# Patient Record
Sex: MALE | Race: WHITE | NOT HISPANIC OR LATINO | Employment: OTHER | URBAN - METROPOLITAN AREA
[De-identification: names, ages, dates, MRNs, and addresses within clinical notes are randomized per-mention and may not be internally consistent; named-entity substitution may affect disease eponyms.]

---

## 2018-04-30 ENCOUNTER — APPOINTMENT (OUTPATIENT)
Dept: RADIOLOGY | Facility: MEDICAL CENTER | Age: 73
DRG: 246 | End: 2018-04-30
Attending: INTERNAL MEDICINE
Payer: MEDICARE

## 2018-04-30 ENCOUNTER — APPOINTMENT (OUTPATIENT)
Dept: RADIOLOGY | Facility: MEDICAL CENTER | Age: 73
DRG: 246 | End: 2018-04-30
Attending: EMERGENCY MEDICINE
Payer: MEDICARE

## 2018-04-30 ENCOUNTER — HOSPITAL ENCOUNTER (OUTPATIENT)
Dept: RADIOLOGY | Facility: MEDICAL CENTER | Age: 73
End: 2018-04-30

## 2018-04-30 ENCOUNTER — HOSPITAL ENCOUNTER (INPATIENT)
Facility: MEDICAL CENTER | Age: 73
LOS: 16 days | DRG: 246 | End: 2018-05-16
Attending: EMERGENCY MEDICINE | Admitting: INTERNAL MEDICINE
Payer: MEDICARE

## 2018-04-30 DIAGNOSIS — I21.4 NSTEMI (NON-ST ELEVATED MYOCARDIAL INFARCTION) (HCC): ICD-10-CM

## 2018-04-30 DIAGNOSIS — I20.0 UNSTABLE ANGINA PECTORIS (HCC): ICD-10-CM

## 2018-04-30 DIAGNOSIS — I50.21 ACUTE SYSTOLIC (CONGESTIVE) HEART FAILURE (HCC): ICD-10-CM

## 2018-04-30 DIAGNOSIS — I50.23 ACUTE ON CHRONIC SYSTOLIC CONGESTIVE HEART FAILURE (HCC): ICD-10-CM

## 2018-04-30 DIAGNOSIS — Z51.5 COMFORT MEASURES ONLY STATUS: ICD-10-CM

## 2018-04-30 DIAGNOSIS — R41.0 CONFUSION: ICD-10-CM

## 2018-04-30 DIAGNOSIS — I35.0 SEVERE AORTIC STENOSIS: ICD-10-CM

## 2018-04-30 DIAGNOSIS — R79.89 TROPONIN LEVEL ELEVATED: ICD-10-CM

## 2018-04-30 DIAGNOSIS — I25.9 CHEST PAIN DUE TO MYOCARDIAL ISCHEMIA, UNSPECIFIED ISCHEMIC CHEST PAIN TYPE: ICD-10-CM

## 2018-04-30 PROBLEM — E03.9 HYPOTHYROIDISM: Status: ACTIVE | Noted: 2018-04-30

## 2018-04-30 PROBLEM — N39.41 URGE INCONTINENCE: Status: ACTIVE | Noted: 2018-04-30

## 2018-04-30 PROBLEM — R41.3 MEMORY IMPAIRMENT: Status: ACTIVE | Noted: 2018-04-30

## 2018-04-30 PROBLEM — G89.4 CHRONIC PAIN SYNDROME: Status: ACTIVE | Noted: 2018-04-30

## 2018-04-30 LAB
APTT PPP: 27.6 SEC (ref 24.7–36)
EKG IMPRESSION: NORMAL
INR PPP: 1.14 (ref 0.87–1.13)
LV EJECT FRACT MOD 2C 99903: 23.87
LV EJECT FRACT MOD 4C 99902: 31.79
LV EJECT FRACT MOD BP 99901: 27.31
PROTHROMBIN TIME: 14.3 SEC (ref 12–14.6)
T4 FREE SERPL-MCNC: 0.91 NG/DL (ref 0.53–1.43)
TROPONIN I SERPL-MCNC: 5.2 NG/ML (ref 0–0.04)
TROPONIN I SERPL-MCNC: 6.52 NG/ML (ref 0–0.04)
TSH SERPL DL<=0.005 MIU/L-ACNC: 4.27 UIU/ML (ref 0.38–5.33)

## 2018-04-30 PROCEDURE — A9270 NON-COVERED ITEM OR SERVICE: HCPCS | Performed by: INTERNAL MEDICINE

## 2018-04-30 PROCEDURE — 700102 HCHG RX REV CODE 250 W/ 637 OVERRIDE(OP): Performed by: INTERNAL MEDICINE

## 2018-04-30 PROCEDURE — 92921 HCHG PRQ CARDIAC ANGIOPLAST ADDL LD: CPT | Mod: LD

## 2018-04-30 PROCEDURE — 71045 X-RAY EXAM CHEST 1 VIEW: CPT

## 2018-04-30 PROCEDURE — 84484 ASSAY OF TROPONIN QUANT: CPT

## 2018-04-30 PROCEDURE — 93460 R&L HRT ART/VENTRICLE ANGIO: CPT

## 2018-04-30 PROCEDURE — 700111 HCHG RX REV CODE 636 W/ 250 OVERRIDE (IP)

## 2018-04-30 PROCEDURE — C1894 INTRO/SHEATH, NON-LASER: HCPCS

## 2018-04-30 PROCEDURE — 99152 MOD SED SAME PHYS/QHP 5/>YRS: CPT

## 2018-04-30 PROCEDURE — 700105 HCHG RX REV CODE 258: Performed by: INTERNAL MEDICINE

## 2018-04-30 PROCEDURE — 85730 THROMBOPLASTIN TIME PARTIAL: CPT

## 2018-04-30 PROCEDURE — C1760 CLOSURE DEV, VASC: HCPCS

## 2018-04-30 PROCEDURE — 93458 L HRT ARTERY/VENTRICLE ANGIO: CPT

## 2018-04-30 PROCEDURE — 93005 ELECTROCARDIOGRAM TRACING: CPT | Performed by: EMERGENCY MEDICINE

## 2018-04-30 PROCEDURE — 99153 MOD SED SAME PHYS/QHP EA: CPT

## 2018-04-30 PROCEDURE — 84439 ASSAY OF FREE THYROXINE: CPT

## 2018-04-30 PROCEDURE — 93306 TTE W/DOPPLER COMPLETE: CPT | Mod: 26 | Performed by: INTERNAL MEDICINE

## 2018-04-30 PROCEDURE — 99285 EMERGENCY DEPT VISIT HI MDM: CPT

## 2018-04-30 PROCEDURE — 305478 HCHG 7.5FR EDWARDS SWAN/THERMO

## 2018-04-30 PROCEDURE — 4A023N7 MEASUREMENT OF CARDIAC SAMPLING AND PRESSURE, LEFT HEART, PERCUTANEOUS APPROACH: ICD-10-PCS | Performed by: INTERNAL MEDICINE

## 2018-04-30 PROCEDURE — 99223 1ST HOSP IP/OBS HIGH 75: CPT | Mod: AI | Performed by: INTERNAL MEDICINE

## 2018-04-30 PROCEDURE — 027034Z DILATION OF CORONARY ARTERY, ONE ARTERY WITH DRUG-ELUTING INTRALUMINAL DEVICE, PERCUTANEOUS APPROACH: ICD-10-PCS | Performed by: INTERNAL MEDICINE

## 2018-04-30 PROCEDURE — 84443 ASSAY THYROID STIM HORMONE: CPT

## 2018-04-30 PROCEDURE — C1874 STENT, COATED/COV W/DEL SYS: HCPCS

## 2018-04-30 PROCEDURE — C1887 CATHETER, GUIDING: HCPCS

## 2018-04-30 PROCEDURE — 770020 HCHG ROOM/CARE - TELE (206)

## 2018-04-30 PROCEDURE — 93306 TTE W/DOPPLER COMPLETE: CPT

## 2018-04-30 PROCEDURE — 96365 THER/PROPH/DIAG IV INF INIT: CPT

## 2018-04-30 PROCEDURE — 700111 HCHG RX REV CODE 636 W/ 250 OVERRIDE (IP): Performed by: INTERNAL MEDICINE

## 2018-04-30 PROCEDURE — C1769 GUIDE WIRE: HCPCS

## 2018-04-30 PROCEDURE — 94760 N-INVAS EAR/PLS OXIMETRY 1: CPT

## 2018-04-30 PROCEDURE — 305387 HCHG SUTURES

## 2018-04-30 PROCEDURE — 70450 CT HEAD/BRAIN W/O DYE: CPT

## 2018-04-30 PROCEDURE — 93005 ELECTROCARDIOGRAM TRACING: CPT | Performed by: INTERNAL MEDICINE

## 2018-04-30 PROCEDURE — 360979 HCHG DIAGNOSTIC CATH

## 2018-04-30 PROCEDURE — 96375 TX/PRO/DX INJ NEW DRUG ADDON: CPT

## 2018-04-30 PROCEDURE — B2151ZZ FLUOROSCOPY OF LEFT HEART USING LOW OSMOLAR CONTRAST: ICD-10-PCS | Performed by: INTERNAL MEDICINE

## 2018-04-30 PROCEDURE — C9600 PERC DRUG-EL COR STENT SING: HCPCS | Mod: LC

## 2018-04-30 PROCEDURE — B2111ZZ FLUOROSCOPY OF MULTIPLE CORONARY ARTERIES USING LOW OSMOLAR CONTRAST: ICD-10-PCS | Performed by: INTERNAL MEDICINE

## 2018-04-30 PROCEDURE — 93010 ELECTROCARDIOGRAM REPORT: CPT | Performed by: INTERNAL MEDICINE

## 2018-04-30 PROCEDURE — C1725 CATH, TRANSLUMIN NON-LASER: HCPCS

## 2018-04-30 PROCEDURE — 304952 HCHG R 2 PADS

## 2018-04-30 PROCEDURE — 85610 PROTHROMBIN TIME: CPT

## 2018-04-30 PROCEDURE — 700101 HCHG RX REV CODE 250

## 2018-04-30 RX ORDER — HEPARIN SODIUM,PORCINE 1000/ML
VIAL (ML) INJECTION
Status: COMPLETED
Start: 2018-04-30 | End: 2018-04-30

## 2018-04-30 RX ORDER — DOCUSATE CALCIUM 240 MG
240 CAPSULE ORAL 2 TIMES DAILY
COMMUNITY

## 2018-04-30 RX ORDER — ACETAMINOPHEN 325 MG/1
650 TABLET ORAL EVERY 6 HOURS PRN
Status: DISCONTINUED | OUTPATIENT
Start: 2018-04-30 | End: 2018-05-16 | Stop reason: HOSPADM

## 2018-04-30 RX ORDER — BISACODYL 10 MG
10 SUPPOSITORY, RECTAL RECTAL
Status: DISCONTINUED | OUTPATIENT
Start: 2018-04-30 | End: 2018-05-02

## 2018-04-30 RX ORDER — ASPIRIN 300 MG/1
300 SUPPOSITORY RECTAL DAILY
Status: DISCONTINUED | OUTPATIENT
Start: 2018-04-30 | End: 2018-05-01 | Stop reason: DRUGHIGH

## 2018-04-30 RX ORDER — LEVETIRACETAM 500 MG/1
1500 TABLET ORAL 2 TIMES DAILY
Status: DISCONTINUED | OUTPATIENT
Start: 2018-04-30 | End: 2018-05-16 | Stop reason: HOSPADM

## 2018-04-30 RX ORDER — NITROGLYCERIN 0.4 MG/1
0.4 TABLET SUBLINGUAL
Status: DISCONTINUED | OUTPATIENT
Start: 2018-04-30 | End: 2018-05-06

## 2018-04-30 RX ORDER — METOPROLOL TARTRATE 50 MG/1
50 TABLET, FILM COATED ORAL 2 TIMES DAILY
Status: ON HOLD | COMMUNITY
End: 2018-05-03

## 2018-04-30 RX ORDER — DOPAMINE HYDROCHLORIDE 160 MG/100ML
INJECTION, SOLUTION INTRAVENOUS
Status: COMPLETED
Start: 2018-04-30 | End: 2018-04-30

## 2018-04-30 RX ORDER — ONDANSETRON 4 MG/1
4 TABLET, ORALLY DISINTEGRATING ORAL EVERY 4 HOURS PRN
Status: DISCONTINUED | OUTPATIENT
Start: 2018-04-30 | End: 2018-05-02

## 2018-04-30 RX ORDER — DOXAZOSIN 2 MG/1
2 TABLET ORAL DAILY
Status: DISCONTINUED | OUTPATIENT
Start: 2018-05-01 | End: 2018-05-05

## 2018-04-30 RX ORDER — MECLIZINE HCL 12.5 MG/1
12.5 TABLET ORAL 3 TIMES DAILY PRN
COMMUNITY

## 2018-04-30 RX ORDER — VERAPAMIL HYDROCHLORIDE 2.5 MG/ML
INJECTION, SOLUTION INTRAVENOUS
Status: COMPLETED
Start: 2018-04-30 | End: 2018-04-30

## 2018-04-30 RX ORDER — ONDANSETRON 2 MG/ML
4 INJECTION INTRAMUSCULAR; INTRAVENOUS EVERY 4 HOURS PRN
Status: DISCONTINUED | OUTPATIENT
Start: 2018-04-30 | End: 2018-05-01

## 2018-04-30 RX ORDER — HEPARIN SODIUM 1000 [USP'U]/ML
3200 INJECTION, SOLUTION INTRAVENOUS; SUBCUTANEOUS PRN
Status: DISCONTINUED | OUTPATIENT
Start: 2018-04-30 | End: 2018-05-01

## 2018-04-30 RX ORDER — LIDOCAINE HYDROCHLORIDE 20 MG/ML
INJECTION, SOLUTION INFILTRATION; PERINEURAL
Status: COMPLETED
Start: 2018-04-30 | End: 2018-04-30

## 2018-04-30 RX ORDER — MIDAZOLAM HYDROCHLORIDE 1 MG/ML
INJECTION INTRAMUSCULAR; INTRAVENOUS
Status: COMPLETED
Start: 2018-04-30 | End: 2018-04-30

## 2018-04-30 RX ORDER — ASPIRIN 81 MG/1
324 TABLET, CHEWABLE ORAL DAILY
Status: DISCONTINUED | OUTPATIENT
Start: 2018-04-30 | End: 2018-05-01 | Stop reason: DRUGHIGH

## 2018-04-30 RX ORDER — ASPIRIN 325 MG
325 TABLET ORAL DAILY
Status: DISCONTINUED | OUTPATIENT
Start: 2018-04-30 | End: 2018-05-01 | Stop reason: DRUGHIGH

## 2018-04-30 RX ORDER — CHOLECALCIFEROL (VITAMIN D3) 125 MCG
500 CAPSULE ORAL DAILY
Status: DISCONTINUED | OUTPATIENT
Start: 2018-05-01 | End: 2018-05-07

## 2018-04-30 RX ORDER — CITALOPRAM 20 MG/1
20 TABLET ORAL DAILY
COMMUNITY

## 2018-04-30 RX ORDER — SELEGILINE HYDROCHLORIDE 5 MG/1
5 TABLET ORAL 2 TIMES DAILY
Status: DISCONTINUED | OUTPATIENT
Start: 2018-04-30 | End: 2018-05-16 | Stop reason: HOSPADM

## 2018-04-30 RX ORDER — MORPHINE SULFATE 4 MG/ML
INJECTION, SOLUTION INTRAMUSCULAR; INTRAVENOUS
Status: COMPLETED
Start: 2018-04-30 | End: 2018-04-30

## 2018-04-30 RX ORDER — ATORVASTATIN CALCIUM 40 MG/1
40 TABLET, FILM COATED ORAL EVERY EVENING
Status: DISCONTINUED | OUTPATIENT
Start: 2018-04-30 | End: 2018-05-16 | Stop reason: HOSPADM

## 2018-04-30 RX ORDER — MECLIZINE HCL 12.5 MG/1
12.5 TABLET ORAL 3 TIMES DAILY PRN
Status: DISCONTINUED | OUTPATIENT
Start: 2018-04-30 | End: 2018-05-16 | Stop reason: HOSPADM

## 2018-04-30 RX ORDER — DOCUSATE CALCIUM 240 MG
240 CAPSULE ORAL 2 TIMES DAILY
Status: DISCONTINUED | OUTPATIENT
Start: 2018-04-30 | End: 2018-05-16 | Stop reason: HOSPADM

## 2018-04-30 RX ORDER — BIVALIRUDIN 250 MG/5ML
INJECTION, POWDER, LYOPHILIZED, FOR SOLUTION INTRAVENOUS
Status: COMPLETED
Start: 2018-04-30 | End: 2018-04-30

## 2018-04-30 RX ORDER — CITALOPRAM 20 MG/1
20 TABLET ORAL DAILY
Status: DISCONTINUED | OUTPATIENT
Start: 2018-05-01 | End: 2018-05-07

## 2018-04-30 RX ORDER — POLYETHYLENE GLYCOL 3350 17 G/17G
1 POWDER, FOR SOLUTION ORAL
Status: DISCONTINUED | OUTPATIENT
Start: 2018-04-30 | End: 2018-05-02

## 2018-04-30 RX ORDER — SODIUM CHLORIDE 9 MG/ML
INJECTION, SOLUTION INTRAVENOUS CONTINUOUS
Status: DISCONTINUED | OUTPATIENT
Start: 2018-04-30 | End: 2018-05-01

## 2018-04-30 RX ORDER — AMOXICILLIN 250 MG
2 CAPSULE ORAL 2 TIMES DAILY
Status: DISCONTINUED | OUTPATIENT
Start: 2018-04-30 | End: 2018-05-02

## 2018-04-30 RX ORDER — GABAPENTIN 300 MG/1
300 CAPSULE ORAL 3 TIMES DAILY
Status: DISCONTINUED | OUTPATIENT
Start: 2018-04-30 | End: 2018-05-16 | Stop reason: HOSPADM

## 2018-04-30 RX ORDER — METOPROLOL TARTRATE 50 MG/1
50 TABLET, FILM COATED ORAL 2 TIMES DAILY
Status: DISCONTINUED | OUTPATIENT
Start: 2018-04-30 | End: 2018-05-03

## 2018-04-30 RX ADMIN — MORPHINE SULFATE 2 MG: 4 INJECTION INTRAVENOUS at 20:21

## 2018-04-30 RX ADMIN — NITROGLYCERIN 10 ML: 20 INJECTION INTRAVENOUS at 23:47

## 2018-04-30 RX ADMIN — NITROGLYCERIN 0.4 MG: 0.4 TABLET SUBLINGUAL at 20:07

## 2018-04-30 RX ADMIN — LIDOCAINE HYDROCHLORIDE: 20 INJECTION, SOLUTION INFILTRATION; PERINEURAL at 23:47

## 2018-04-30 RX ADMIN — GABAPENTIN 300 MG: 300 CAPSULE ORAL at 18:02

## 2018-04-30 RX ADMIN — NITROGLYCERIN 0.4 MG: 0.4 TABLET SUBLINGUAL at 19:54

## 2018-04-30 RX ADMIN — HEPARIN SODIUM 1200 UNITS/HR: 5000 INJECTION, SOLUTION INTRAVENOUS; SUBCUTANEOUS at 18:06

## 2018-04-30 RX ADMIN — BIVALIRUDIN 250 MG: 250 INJECTION, POWDER, LYOPHILIZED, FOR SOLUTION INTRAVENOUS at 23:49

## 2018-04-30 RX ADMIN — HEPARIN SODIUM 2000 UNITS: 200 INJECTION, SOLUTION INTRAVENOUS at 23:47

## 2018-04-30 RX ADMIN — MIDAZOLAM 2 MG: 1 INJECTION INTRAMUSCULAR; INTRAVENOUS at 23:48

## 2018-04-30 RX ADMIN — HEPARIN SODIUM: 1000 INJECTION, SOLUTION INTRAVENOUS; SUBCUTANEOUS at 23:48

## 2018-04-30 RX ADMIN — SODIUM CHLORIDE: 9 INJECTION, SOLUTION INTRAVENOUS at 18:13

## 2018-04-30 RX ADMIN — HEPARIN SODIUM 3200 UNITS: 1000 INJECTION, SOLUTION INTRAVENOUS; SUBCUTANEOUS at 18:06

## 2018-04-30 RX ADMIN — ASPIRIN 325 MG: 325 TABLET ORAL at 18:02

## 2018-04-30 ASSESSMENT — ENCOUNTER SYMPTOMS
WEAKNESS: 1
PALPITATIONS: 0
SPUTUM PRODUCTION: 0
HEADACHES: 0
DEPRESSION: 0
FOCAL WEAKNESS: 0
TREMORS: 1
HEMOPTYSIS: 0
NECK PAIN: 0
MYALGIAS: 0
DIZZINESS: 0
BRUISES/BLEEDS EASILY: 0
COUGH: 0
FEVER: 0
TINGLING: 0
LOSS OF CONSCIOUSNESS: 0
VOMITING: 1
NAUSEA: 1
SHORTNESS OF BREATH: 1
WEIGHT LOSS: 0
HEARTBURN: 0
CHILLS: 0
BLURRED VISION: 0
ABDOMINAL PAIN: 0

## 2018-04-30 ASSESSMENT — PAIN SCALES - GENERAL
PAINLEVEL_OUTOF10: 0
PAINLEVEL_OUTOF10: 0

## 2018-04-30 ASSESSMENT — LIFESTYLE VARIABLES: DO YOU DRINK ALCOHOL: NO

## 2018-04-30 NOTE — ED TRIAGE NOTES
.  Chief Complaint   Patient presents with   • MI (Non ST Segment Elevation MI)     PT TRANSFER FROM [facility] FOR CP AND TROPONIN OF 3.54 WITH ST DEPRESSION, RECEIVED ASPIRIN, MORPHINE AND NTG PTA.

## 2018-04-30 NOTE — ED PROVIDER NOTES
ED Provider Note    Scribed for Prince Velez M.D. by John Rooney. 4/30/2018  4:02 PM    Primary Care Provider: Ze Whitt M.D.  Means of arrival: EMS  History limited by: Confused, difficulty answering questions, received morphine PTA    CHIEF COMPLAINT  Chief Complaint   Patient presents with   • MI (Non ST Segment Elevation MI)     PT TRANSFER FROM [facility] FOR CP AND TROPONIN OF 3.54 WITH ST DEPRESSION, RECEIVED ASPIRIN, MORPHINE AND NTG PTA.       HPI  Prince Pham is a 72 y.o. male who presents to the ED as a transfer from [facility] for evaluation of an NSTEMI. He was feeling fine until yesterday evening when he began to experience chest pain. Patient took nitroglycerin without relief from his pain. Additionally, per review of transfer information, the patient fell this morning striking his head on the ground. He was seen at [facility] and his troponin was 3.51 and EKG revealed ST depression. Patient received aspirin, morphine, and nitroglycerin prior to arrival here. The patient reports associated headache. He denies shortness of breath, sore throat, blurred vision, abdominal pain, or vomiting. [facility]  HPI limited by the patient's status and confused and difficulty answering questions.    REVIEW OF SYSTEMS  EYES:  Denies blurred vision.   ENT:  Denies sore throat  CARDIOVASCULAR:  Positive for chest pain.  RESPIRATORY:  Denies shortness of breath  GI:  Denies abdominal pain or vomiting.  NEUROLOGIC: Positive for head injury and headache.  ROS limited by the patient's status and confused and difficulty answering questions.    PAST MEDICAL HISTORY  Past Medical History:   Diagnosis Date   • colon Cancer     surgery 8/2006, now in remission, testicular   • Hypertension    • Infectious disease     had shingles December 2013   • Parkinson's disease    • Seizure disorder (CMS-HCC)     last one ? 2010, from head injury   • Testicular cancer (CMS-HCC)     Parkinson's    FAMILY HISTORY  Unknown    SOCIAL HISTORY   reports that he has never smoked. He does not have any smokeless tobacco history on file. He reports that he does not drink alcohol or use drugs.    SURGICAL HISTORY  Past Surgical History:   Procedure Laterality Date   • CERVICAL FUSION POSTERIOR  9/4/2014    Performed by Eligio Bella M.D. at SURGERY [facility]S       CURRENT MEDICATIONS  No current facility-administered medications on file prior to encounter.      Current Outpatient Prescriptions on File Prior to Encounter   Medication Sig Dispense Refill   • doxazosin (CARDURA) 2 MG Tab Take 2 mg by mouth every day.     • aspirin EC (ECOTRIN) 81 MG Tablet Delayed Response Take 81 mg by mouth every day.     • cyanocobalamin (VITAMIN B12) 500 MCG tablet Take 1 Tab by mouth every day. 100 Tab 3   • citalopram (CELEXA) 20 MG Tab Take 40 mg by mouth every day.     • selegiline (ELDEPRYL) 5 MG TABS Take 5 mg by mouth 2 Times a Day. 5 mg 0700 and 1200     • levetiracetam (KEPPRA) 500 MG TABS Take 1,500 mg by mouth 2 times a day. Indications: Tonic-Clonic Seizures     • gabapentin (NEURONTIN) 300 MG CAPS Take 300 mg by mouth 3 times a day. 0700 1200 1700           ALLERGIES  Allergies   Allergen Reactions   • Nkda [No Known Drug Allergy]        PHYSICAL EXAM  VITAL SIGNS: /66   Pulse 96   Temp 36.5 °C (97.7 °F)   Resp 16      Constitutional: Patient is awake. Confused, Follows commands. Mild respiratory distress. Well developed, Well nourished, Non-toxic appearance.  HENT: Normocephalic, Atraumatic, Bilateral external ears normal, Oropharynx pink and dry, with no exudates, Nose patent.  Eyes: PERRLA, EOMI, Sclera and conjunctiva clear, No discharge.   Neck:  Supple no nuchal rigidity, no thyromegaly or mass. Non-tender  Lymphatic: No supraclavicular lymph nodes.   Cardiovascular: Heart is regular rate and rhythm no murmur, rub or thrill.   Thorax & Lungs: Chest is symmetrical, with good breath  sounds. No wheezing or crackles. Mild respiratory distress, No chest tenderness.   Abdomen: Soft, No tenderness no hepatosplenomegaly there is no guarding or rebound, No masses, No pulsatile masses. Bowel sounds are present.  Skin: Warm, Dry, no petechia, purpura, or rash.   Back: Non tender with palpation, No CVA tenderness.   Extremities: No edema. Non tender.   Musculoskeletal: Good range of motion to wrists, elbows, shoulders, hips, knees, and ankles. Pulses 2+ and symmetrical radially and femorally. No gross deformities noted.   Neurologic: Alert oriented to person, place, and time. Confused, Follows commands Strength is 5 over 5 and symmetric in bilateral upper and lower extremities.  Sensory is intact to light touch to face, arms, and legs.  DTRs are symmetrical in biceps brachioradialis, patella and Achilles.   Psychiatric: Normal affect    EKG  1547- 13 Lead EKG interpreted by me shows normal sinus rhythm at 84.  . Axis QRS 21, ST depression in V4-6, Isoelectric point on lead III. Similar to EKG taken earlier today.    LABS  Lab Results   Component Value Date    WBC 8.4 12/03/2016    HEMOGLOBIN 12.8 (L) 12/03/2016    HEMATOCRIT 38.4 (L) 12/03/2016    PLATELETCT 274 12/03/2016    SODIUM 139 12/03/2016    POTASSIUM 4.1 12/03/2016    CHLORIDE 108 12/03/2016    CO2 26 12/03/2016    BUN 17 12/03/2016    GLUCOSE 129 (H) 12/03/2016    CHOLSTRLTOT 137 12/03/2016    LDL 77 12/03/2016    ALTSGPT 17 12/03/2016    ASTSGOT 21 12/03/2016    INR 1.14 (H) 04/30/2018       All labs reviewed by me.    RADIOLOGY/PROCEDURES  OUTSIDE IMAGES-DX CHEST   Final Result      CT-HEAD W/O    (Results Pending)     CT-HEAD W/O   Final Result      1.  Diffuse atrophy and white matter changes.   2.  No acute intracranial hemorrhage or territorial infarct.   3.  Multiple small chronic infarcts.      OUTSIDE IMAGES-DX CHEST   Final Result      Echocardiogram Comp W/O Cont    (Results Pending)   MR-BRAIN-W/O    (Results Pending)        The radiologist's interpretations of all radiological studies have been reviewed by me.     COURSE & MEDICAL DECISION MAKING  Pertinent Labs & Imaging studies reviewed. (See chart for details)    Differential diagnoses include but are not limited to NSTEMI vs stroke vs PE vs dissection    4:02 PM - Patient seen and examined at bedside. Requested ED tests from [facility]. Ordered CT head without contrast, PT/INR, APTT, TSH, free thyroxine, troponin, and EKG.     4:31 PM Paged Hospitalist.    4:37 PM I discussed the patient's case and the above findings with CT who agree to take the patient to CT as soon as possible.    4:43 PM I discussed the patient's case and the above findings with Dr. Carter (Hospitalist) who agrees to admit and will transfer care of the patient at this time.    Decision Making  Patient who has bad Parkinson's and several underlying medical problems. He states last night he developed chest pain took some nitroglycerin didn't help and then fell hitting his head. He isn't seeing a [facility] where he was noted to have a troponin of 3.5. He is given aspirin. He isn't transferred here for further care and evaluation. Upon arrival I signed. Ordered a CAT scan of his head that did not show any intercranial bleeding. Repeat EKG here showed mild ST depressions but no obvious ST elevations. I discussed case with cardiology and with the [facility] Hospitalist. He'll be admitted to the hospital for further care and evaluation and is apparent acute myocardial infarction non-STEMI. His confusion certainly could be from several things although possibly from his Parkinson's and also the morphine that he received. Since that he was here for some time he seemed to be much more mentally clear than previous.    DISPOSITION:  Patient will be admitted to Dr. Carter (Hospitalist) in guarded condition.    FINAL IMPRESSION  1. Elevated troponin  2. Presumed non-STEMI myocardial infarction  3. Confusion    PLAN  1.  Admission [facility]ist  2. Continue workup and evaluation of his elevated troponins and confusion       John MOORE (Scribe), am scribing for, and in the presence of, Prince Velez M.D..    Electronically signed by: John Rooney (Scribe), 4/30/2018    Prince MOORE M.D. personally performed the services described in this documentation, as scribed by John Rooney in my presence, and it is both accurate and complete.    The note accurately reflects work and decisions made by me.  Prince Velez  4/30/2018  6:16 PM

## 2018-04-30 NOTE — ED NOTES
Med rec updated and complete.  Allergies reviewed .  Met with pt at bedside and dicussed current medications  And last doses taken.  Pt denies antibiotic use in last 30 days.

## 2018-05-01 LAB
ACT BLD: 351 SEC (ref 74–137)
ANION GAP SERPL CALC-SCNC: 11 MMOL/L (ref 0–11.9)
BASOPHILS # BLD AUTO: 0.2 % (ref 0–1.8)
BASOPHILS # BLD: 0.02 K/UL (ref 0–0.12)
BUN SERPL-MCNC: 22 MG/DL (ref 8–22)
CALCIUM SERPL-MCNC: 8.4 MG/DL (ref 8.5–10.5)
CHLORIDE SERPL-SCNC: 107 MMOL/L (ref 96–112)
CHOLEST SERPL-MCNC: 107 MG/DL (ref 100–199)
CO2 SERPL-SCNC: 17 MMOL/L (ref 20–33)
CREAT SERPL-MCNC: 1.07 MG/DL (ref 0.5–1.4)
EKG IMPRESSION: NORMAL
EOSINOPHIL # BLD AUTO: 0 K/UL (ref 0–0.51)
EOSINOPHIL NFR BLD: 0 % (ref 0–6.9)
ERYTHROCYTE [DISTWIDTH] IN BLOOD BY AUTOMATED COUNT: 49.3 FL (ref 35.9–50)
GLUCOSE SERPL-MCNC: 135 MG/DL (ref 65–99)
HCT VFR BLD AUTO: 35.3 % (ref 42–52)
HDLC SERPL-MCNC: 33 MG/DL
HGB BLD-MCNC: 11.6 G/DL (ref 14–18)
IMM GRANULOCYTES # BLD AUTO: 0.04 K/UL (ref 0–0.11)
IMM GRANULOCYTES NFR BLD AUTO: 0.4 % (ref 0–0.9)
LDLC SERPL CALC-MCNC: 48 MG/DL
LV EJECT FRACT  99904: 15
LV EJECT FRACT MOD 2C 99903: 13.15
LV EJECT FRACT MOD 4C 99902: 16.94
LV EJECT FRACT MOD BP 99901: 13.89
LYMPHOCYTES # BLD AUTO: 0.53 K/UL (ref 1–4.8)
LYMPHOCYTES NFR BLD: 4.8 % (ref 22–41)
MAGNESIUM SERPL-MCNC: 2 MG/DL (ref 1.5–2.5)
MCH RBC QN AUTO: 31 PG (ref 27–33)
MCHC RBC AUTO-ENTMCNC: 32.9 G/DL (ref 33.7–35.3)
MCV RBC AUTO: 94.4 FL (ref 81.4–97.8)
MONOCYTES # BLD AUTO: 0.69 K/UL (ref 0–0.85)
MONOCYTES NFR BLD AUTO: 6.3 % (ref 0–13.4)
NEUTROPHILS # BLD AUTO: 9.7 K/UL (ref 1.82–7.42)
NEUTROPHILS NFR BLD: 88.3 % (ref 44–72)
NRBC # BLD AUTO: 0 K/UL
NRBC BLD-RTO: 0 /100 WBC
PLATELET # BLD AUTO: 224 K/UL (ref 164–446)
PMV BLD AUTO: 11.2 FL (ref 9–12.9)
POTASSIUM SERPL-SCNC: 4.4 MMOL/L (ref 3.6–5.5)
RBC # BLD AUTO: 3.74 M/UL (ref 4.7–6.1)
SODIUM SERPL-SCNC: 135 MMOL/L (ref 135–145)
TRIGL SERPL-MCNC: 129 MG/DL (ref 0–149)
WBC # BLD AUTO: 11 K/UL (ref 4.8–10.8)

## 2018-05-01 PROCEDURE — G8979 MOBILITY GOAL STATUS: HCPCS | Mod: CI

## 2018-05-01 PROCEDURE — 700105 HCHG RX REV CODE 258

## 2018-05-01 PROCEDURE — A9270 NON-COVERED ITEM OR SERVICE: HCPCS | Performed by: INTERNAL MEDICINE

## 2018-05-01 PROCEDURE — 93010 ELECTROCARDIOGRAM REPORT: CPT | Mod: 76 | Performed by: INTERNAL MEDICINE

## 2018-05-01 PROCEDURE — 700102 HCHG RX REV CODE 250 W/ 637 OVERRIDE(OP)

## 2018-05-01 PROCEDURE — 93306 TTE W/DOPPLER COMPLETE: CPT | Mod: 26 | Performed by: INTERNAL MEDICINE

## 2018-05-01 PROCEDURE — 80061 LIPID PANEL: CPT

## 2018-05-01 PROCEDURE — 700105 HCHG RX REV CODE 258: Performed by: INTERNAL MEDICINE

## 2018-05-01 PROCEDURE — 93005 ELECTROCARDIOGRAM TRACING: CPT | Performed by: INTERNAL MEDICINE

## 2018-05-01 PROCEDURE — A9270 NON-COVERED ITEM OR SERVICE: HCPCS

## 2018-05-01 PROCEDURE — 83735 ASSAY OF MAGNESIUM: CPT

## 2018-05-01 PROCEDURE — 85347 COAGULATION TIME ACTIVATED: CPT

## 2018-05-01 PROCEDURE — 80048 BASIC METABOLIC PNL TOTAL CA: CPT

## 2018-05-01 PROCEDURE — A9270 NON-COVERED ITEM OR SERVICE: HCPCS | Performed by: NURSE PRACTITIONER

## 2018-05-01 PROCEDURE — G8978 MOBILITY CURRENT STATUS: HCPCS | Mod: CK

## 2018-05-01 PROCEDURE — 700101 HCHG RX REV CODE 250

## 2018-05-01 PROCEDURE — 85025 COMPLETE CBC W/AUTO DIFF WBC: CPT

## 2018-05-01 PROCEDURE — 700102 HCHG RX REV CODE 250 W/ 637 OVERRIDE(OP): Performed by: INTERNAL MEDICINE

## 2018-05-01 PROCEDURE — 700102 HCHG RX REV CODE 250 W/ 637 OVERRIDE(OP): Performed by: NURSE PRACTITIONER

## 2018-05-01 PROCEDURE — 700112 HCHG RX REV CODE 229: Performed by: INTERNAL MEDICINE

## 2018-05-01 PROCEDURE — 93308 TTE F-UP OR LMTD: CPT

## 2018-05-01 PROCEDURE — 700111 HCHG RX REV CODE 636 W/ 250 OVERRIDE (IP): Performed by: INTERNAL MEDICINE

## 2018-05-01 PROCEDURE — 99233 SBSQ HOSP IP/OBS HIGH 50: CPT | Performed by: INTERNAL MEDICINE

## 2018-05-01 PROCEDURE — 700117 HCHG RX CONTRAST REV CODE 255: Performed by: INTERNAL MEDICINE

## 2018-05-01 PROCEDURE — 93321 DOPPLER ECHO F-UP/LMTD STD: CPT

## 2018-05-01 PROCEDURE — 97162 PT EVAL MOD COMPLEX 30 MIN: CPT

## 2018-05-01 PROCEDURE — 93325 DOPPLER ECHO COLOR FLOW MAPG: CPT

## 2018-05-01 PROCEDURE — 770020 HCHG ROOM/CARE - TELE (206)

## 2018-05-01 RX ORDER — FUROSEMIDE 20 MG/1
20 TABLET ORAL
Status: DISCONTINUED | OUTPATIENT
Start: 2018-05-02 | End: 2018-05-04

## 2018-05-01 RX ORDER — LISINOPRIL 5 MG/1
5 TABLET ORAL
Status: DISCONTINUED | OUTPATIENT
Start: 2018-05-01 | End: 2018-05-03

## 2018-05-01 RX ORDER — HALOPERIDOL 5 MG/ML
1 INJECTION INTRAMUSCULAR EVERY 6 HOURS PRN
Status: DISCONTINUED | OUTPATIENT
Start: 2018-05-01 | End: 2018-05-02

## 2018-05-01 RX ORDER — SPIRONOLACTONE 25 MG/1
25 TABLET ORAL
Status: DISCONTINUED | OUTPATIENT
Start: 2018-05-01 | End: 2018-05-05

## 2018-05-01 RX ORDER — SODIUM CHLORIDE 9 MG/ML
INJECTION, SOLUTION INTRAVENOUS
Status: COMPLETED
Start: 2018-05-01 | End: 2018-05-01

## 2018-05-01 RX ORDER — DEXAMETHASONE SODIUM PHOSPHATE 4 MG/ML
4 INJECTION, SOLUTION INTRA-ARTICULAR; INTRALESIONAL; INTRAMUSCULAR; INTRAVENOUS; SOFT TISSUE
Status: DISCONTINUED | OUTPATIENT
Start: 2018-05-01 | End: 2018-05-02

## 2018-05-01 RX ORDER — LIDOCAINE HYDROCHLORIDE 20 MG/ML
INJECTION, SOLUTION INFILTRATION; PERINEURAL
Status: COMPLETED
Start: 2018-05-01 | End: 2018-05-01

## 2018-05-01 RX ORDER — ONDANSETRON 2 MG/ML
4 INJECTION INTRAMUSCULAR; INTRAVENOUS EVERY 4 HOURS PRN
Status: DISCONTINUED | OUTPATIENT
Start: 2018-05-01 | End: 2018-05-02

## 2018-05-01 RX ORDER — FUROSEMIDE 10 MG/ML
40 INJECTION INTRAMUSCULAR; INTRAVENOUS ONCE
Status: COMPLETED | OUTPATIENT
Start: 2018-05-01 | End: 2018-05-01

## 2018-05-01 RX ORDER — DIPHENHYDRAMINE HYDROCHLORIDE 50 MG/ML
25 INJECTION INTRAMUSCULAR; INTRAVENOUS EVERY 6 HOURS PRN
Status: DISCONTINUED | OUTPATIENT
Start: 2018-05-01 | End: 2018-05-02

## 2018-05-01 RX ORDER — SCOLOPAMINE TRANSDERMAL SYSTEM 1 MG/1
1 PATCH, EXTENDED RELEASE TRANSDERMAL
Status: DISCONTINUED | OUTPATIENT
Start: 2018-05-01 | End: 2018-05-02

## 2018-05-01 RX ADMIN — SELEGILINE HYDROCHLORIDE 5 MG: 5 TABLET ORAL at 08:30

## 2018-05-01 RX ADMIN — STANDARDIZED SENNA CONCENTRATE AND DOCUSATE SODIUM 2 TABLET: 8.6; 5 TABLET, FILM COATED ORAL at 08:32

## 2018-05-01 RX ADMIN — FENTANYL CITRATE 100 MCG: 50 INJECTION, SOLUTION INTRAMUSCULAR; INTRAVENOUS at 01:28

## 2018-05-01 RX ADMIN — SODIUM CHLORIDE: 9 INJECTION, SOLUTION INTRAVENOUS at 02:26

## 2018-05-01 RX ADMIN — TICAGRELOR 90 MG: 90 TABLET ORAL at 11:09

## 2018-05-01 RX ADMIN — Medication 500 MCG: at 08:32

## 2018-05-01 RX ADMIN — GABAPENTIN 300 MG: 300 CAPSULE ORAL at 08:32

## 2018-05-01 RX ADMIN — GABAPENTIN 300 MG: 300 CAPSULE ORAL at 20:45

## 2018-05-01 RX ADMIN — DOCUSATE CALCIUM 240 MG: 240 CAPSULE, LIQUID FILLED ORAL at 20:47

## 2018-05-01 RX ADMIN — IOHEXOL 228 ML: 350 INJECTION, SOLUTION INTRAVENOUS at 01:29

## 2018-05-01 RX ADMIN — LEVETIRACETAM 1500 MG: 500 TABLET, FILM COATED ORAL at 20:46

## 2018-05-01 RX ADMIN — CARBIDOPA AND LEVODOPA 1.5 TABLET: 25; 100 TABLET ORAL at 08:30

## 2018-05-01 RX ADMIN — LIDOCAINE HYDROCHLORIDE: 20 INJECTION, SOLUTION INFILTRATION; PERINEURAL at 01:29

## 2018-05-01 RX ADMIN — METOPROLOL TARTRATE 50 MG: 50 TABLET, FILM COATED ORAL at 08:32

## 2018-05-01 RX ADMIN — CARBIDOPA AND LEVODOPA 1.5 TABLET: 25; 100 TABLET ORAL at 13:44

## 2018-05-01 RX ADMIN — CITALOPRAM HYDROBROMIDE 20 MG: 20 TABLET ORAL at 08:31

## 2018-05-01 RX ADMIN — SODIUM CHLORIDE 500 ML: 9 INJECTION, SOLUTION INTRAVENOUS at 02:26

## 2018-05-01 RX ADMIN — ASPIRIN 81 MG: 81 TABLET, COATED ORAL at 08:32

## 2018-05-01 RX ADMIN — FUROSEMIDE 40 MG: 10 INJECTION, SOLUTION INTRAMUSCULAR; INTRAVENOUS at 11:09

## 2018-05-01 RX ADMIN — LISINOPRIL 5 MG: 5 TABLET ORAL at 17:59

## 2018-05-01 RX ADMIN — ATORVASTATIN CALCIUM 40 MG: 40 TABLET, FILM COATED ORAL at 20:43

## 2018-05-01 RX ADMIN — LEVETIRACETAM 1500 MG: 500 TABLET, FILM COATED ORAL at 08:31

## 2018-05-01 RX ADMIN — TICAGRELOR 180 MG: 90 TABLET ORAL at 01:29

## 2018-05-01 RX ADMIN — SPIRONOLACTONE 25 MG: 25 TABLET, FILM COATED ORAL at 17:59

## 2018-05-01 RX ADMIN — DOCUSATE CALCIUM 240 MG: 240 CAPSULE, LIQUID FILLED ORAL at 08:31

## 2018-05-01 RX ADMIN — DOXAZOSIN 2 MG: 2 TABLET ORAL at 08:31

## 2018-05-01 RX ADMIN — TICAGRELOR 90 MG: 90 TABLET ORAL at 20:45

## 2018-05-01 RX ADMIN — GABAPENTIN 300 MG: 300 CAPSULE ORAL at 16:02

## 2018-05-01 RX ADMIN — CARBIDOPA AND LEVODOPA 1.5 TABLET: 25; 100 TABLET ORAL at 20:46

## 2018-05-01 RX ADMIN — SELEGILINE HYDROCHLORIDE 5 MG: 5 TABLET ORAL at 20:44

## 2018-05-01 RX ADMIN — METOPROLOL TARTRATE 50 MG: 50 TABLET, FILM COATED ORAL at 20:45

## 2018-05-01 ASSESSMENT — ENCOUNTER SYMPTOMS
CHILLS: 0
MYALGIAS: 0
DEPRESSION: 0
SPUTUM PRODUCTION: 0
FALLS: 0
ABDOMINAL PAIN: 0
NAUSEA: 0
CONSTIPATION: 0
VOMITING: 0
COUGH: 0
STRIDOR: 0
DIZZINESS: 0
LOSS OF CONSCIOUSNESS: 0
DIARRHEA: 0
WEAKNESS: 0
FEVER: 0
SHORTNESS OF BREATH: 1
HEADACHES: 0
TINGLING: 0
PALPITATIONS: 0

## 2018-05-01 ASSESSMENT — COGNITIVE AND FUNCTIONAL STATUS - GENERAL
MOBILITY SCORE: 16
MOVING TO AND FROM BED TO CHAIR: UNABLE
CLIMB 3 TO 5 STEPS WITH RAILING: A LOT
WALKING IN HOSPITAL ROOM: A LITTLE
MOVING FROM LYING ON BACK TO SITTING ON SIDE OF FLAT BED: A LITTLE
STANDING UP FROM CHAIR USING ARMS: A LITTLE
SUGGESTED CMS G CODE MODIFIER MOBILITY: CK

## 2018-05-01 ASSESSMENT — COPD QUESTIONNAIRES
HAVE YOU SMOKED AT LEAST 100 CIGARETTES IN YOUR ENTIRE LIFE: NO/DON'T KNOW
COPD SCREENING SCORE: 2
DO YOU EVER COUGH UP ANY MUCUS OR PHLEGM?: NO/ONLY WITH OCCASIONAL COLDS OR INFECTIONS
DURING THE PAST 4 WEEKS HOW MUCH DID YOU FEEL SHORT OF BREATH: NONE/LITTLE OF THE TIME

## 2018-05-01 ASSESSMENT — PAIN SCALES - GENERAL
PAINLEVEL_OUTOF10: 0
PAINLEVEL_OUTOF10: 3
PAINLEVEL_OUTOF10: 0

## 2018-05-01 ASSESSMENT — GAIT ASSESSMENTS
ASSISTIVE DEVICE: SINGLE POINT CANE
DISTANCE (FEET): 4
GAIT LEVEL OF ASSIST: MINIMAL ASSIST

## 2018-05-01 ASSESSMENT — LIFESTYLE VARIABLES: EVER_SMOKED: NEVER

## 2018-05-01 NOTE — ASSESSMENT & PLAN NOTE
No new seizures noted, continue Keppra and gabapentin at home doses  Encephalopathy, EEG negative  Encephalopathy has resolved now

## 2018-05-01 NOTE — RESPIRATORY CARE
Respiratory Rapid Response Note    Symptoms Chest pain         Pt complaining of chest pain. On 2.5 LPM NC found with 107 heart rate and respiratory rate 32. Given morphine by nurse and pain considerably diminished          O2 (LPM): 2.5 (04/30/18 1950)

## 2018-05-01 NOTE — PROGRESS NOTES
Pt called RN into room complaining of 10/10 chest pain mid sternal non radiating. Pt given two doses of Nitro and rapid response called. Pt had labs drawn, ekg taken, echo done, cxray done, and 2mg morphine ordered. Cards and hosp notified. Cards to bedside. After morphine pt no longer complaining of chest pain. Per Dr. Dominique pt to go to cath lab if recurrent chest pain. All needs met at this time. Will continue to monitor.

## 2018-05-01 NOTE — ASSESSMENT & PLAN NOTE
Continue Sinemet and selegiline, home meds  Ambulating well within hospital wards  SOB / CP / Anxiety / Panic attacks / Cardiac comorbidities limiting ambulation

## 2018-05-01 NOTE — PROGRESS NOTES
Patient transferred from cath lab to cic via bed, on zoll, vss no compliant of pain aox4. Bedside report, left and right fem sites inspected. Patient resting.

## 2018-05-01 NOTE — ASSESSMENT & PLAN NOTE
Noted outside facility, transferred here for follow-up care, now status post cardiac cath with stenting  Continue DAPT, BB, High intensity statin therapy.  Hold ACE-I due to hypotension  Has ongoing intermittent chest pain that is likely angina, not treating due to patient decision for comfort care (5/6/18)  Poor candidate for further interventions per Dr Patel

## 2018-05-01 NOTE — CONSULTS
Reason for Consult:  Asked by Dr Velez to see this patient with positive troponin  Patient's PCP: Ze Whitt M.D.    CC:   Chief Complaint   Patient presents with   • MI (Non ST Segment Elevation MI)     PT TRANSFER FROM [facility] FOR CP AND TROPONIN OF 3.54 WITH ST DEPRESSION, RECEIVED ASPIRIN, MORPHINE AND NTG PTA.       HPI: 72-year-old gentleman with a history of angioplasty in the past possibly at [facility] who presented to [place] emergency room with chest pain earlier today and was found to have abnormal EKG and troponin he had been in his normal state of health which is quite limited by his Parkinson's and he was given morphine there and perhaps that contributed to some change in his ability to respond as he has a quite delayed response but does respond appropriately to questioning.  Apparently also had a fall this morning unclear if is really Parkinson's or underlying strokes with a CAT scan which did not show acute abnormality    Medications / Drug list prior to admission:  No current facility-administered medications on file prior to encounter.      Current Outpatient Prescriptions on File Prior to Encounter   Medication Sig Dispense Refill   • doxazosin (CARDURA) 2 MG Tab Take 2 mg by mouth every day.     • aspirin EC (ECOTRIN) 81 MG Tablet Delayed Response Take 81 mg by mouth every day.     • cyanocobalamin (VITAMIN B12) 500 MCG tablet Take 1 Tab by mouth every day. 100 Tab 3   • selegiline (ELDEPRYL) 5 MG TABS Take 5 mg by mouth 2 Times a Day.     • levetiracetam (KEPPRA) 500 MG TABS Take 1,500 mg by mouth 2 times a day.     • gabapentin (NEURONTIN) 300 MG CAPS Take 300 mg by mouth 3 times a day.         Current list of administered Medications:  No current facility-administered medications for this encounter.     Current Outpatient Prescriptions:   •  citalopram (CELEXA) 20 MG Tab, Take 20 mg by mouth every day., Disp: , Rfl:   •  carbidopa-levodopa (SINEMET)  MG Tab, Take 1.5  Tabs by mouth 4 times a day., Disp: , Rfl:   •  docusate calcium (SURFAK) 240 MG Cap, Take 240 mg by mouth 2 times a day., Disp: , Rfl:   •  metoprolol (LOPRESSOR) 50 MG Tab, Take 50 mg by mouth 2 times a day., Disp: , Rfl:   •  meclizine (ANTIVERT) 12.5 MG Tab, Take 12.5 mg by mouth 3 times a day as needed., Disp: , Rfl:   •  doxazosin (CARDURA) 2 MG Tab, Take 2 mg by mouth every day., Disp: , Rfl:   •  aspirin EC (ECOTRIN) 81 MG Tablet Delayed Response, Take 81 mg by mouth every day., Disp: , Rfl:   •  cyanocobalamin (VITAMIN B12) 500 MCG tablet, Take 1 Tab by mouth every day., Disp: 100 Tab, Rfl: 3  •  selegiline (ELDEPRYL) 5 MG TABS, Take 5 mg by mouth 2 Times a Day., Disp: , Rfl:   •  levetiracetam (KEPPRA) 500 MG TABS, Take 1,500 mg by mouth 2 times a day., Disp: , Rfl:   •  gabapentin (NEURONTIN) 300 MG CAPS, Take 300 mg by mouth 3 times a day., Disp: , Rfl:     Past Medical History:   Diagnosis Date   • colon Cancer     surgery 2006, now in remission, testicular   • Hypertension    • Infectious disease     had shingles 2013   • Parkinson's disease    • Seizure disorder (CMS-HCC)     last one ? , from head injury   • Testicular cancer (CMS-HCC)        Past Surgical History:   Procedure Laterality Date   • CERVICAL FUSION POSTERIOR  2014    Performed by Eligio Bella M.D. at SURGERY [facility]       No family history on file. brother had valvular surgery parents  not from CAD    Social History     Social History   • Marital status: Single     Spouse name: N/A   • Number of children: N/A   • Years of education: N/A     Occupational History   • Not on file.     Social History Main Topics   • Smoking status: Never Smoker   • Smokeless tobacco: Not on file   • Alcohol use No   • Drug use: No   • Sexual activity: Not on file     Other Topics Concern   • Not on file     Social History Narrative    ** Merged History Encounter **            ALLERGIES:  Allergies   Allergen Reactions    • Nkda [No Known Drug Allergy]        Review of systems:  A detailed review of symptoms was reviewed with patient. This is reviewed in H&P and PMH. ALL OTHERS reviewed and negative    Physical exam:  Patient Vitals for the past 24 hrs:   BP Temp Pulse Resp SpO2   18 1704 - - 90 - 95 %   18 1553 103/66 36.5 °C (97.7 °F) 96 16 -       General: No acute distress. Slow to respond to questions  HEENT: OP clear   Neck: No bruits No JVD.   CVS: RRR. S1 + S2. No M/R/G  Resp: CTAB. No wheezing or crackles/rhonchi.  Abdomen: Soft, NT, ND,  Skin: Grossly nothing acute no obvious rashes  Neurological: grossly within normal range   Extremities: Pulse 2+ in b/l LE. No edema. No cyanosis.     Data:  Laboratory studies:  Recent Results (from the past 24 hour(s))   EKG (NOW)    Collection Time: 18  3:47 PM   Result Value Ref Range    Report       [facility] Emergency Dept.    Test Date:  2018  Pt Name:    YUKI LOERA               Department: ER  MRN:        [id number]                      Room:       [facility]  Gender:     Male                         Technician: [id number]  :        1945                   Requested By:ER TRIAGE PROTOCOL  Order #:    [id number]                    Reading MD:    Measurements  Intervals                                Axis  Rate:       84                           P:          48  PA:         156                          QRS:        21  QRSD:       96                           T:          10  QT:         428  QTc:        507    Interpretive Statements  SINUS RHYTHM  PROBABLE POSTERIOR INFARCT  PROLONGED QT INTERVAL  Compared to ECG 2016 21:06:15  Prolonged QT interval now present  Myocardial infarct finding still present     PT/INR    Collection Time: 18  3:57 PM   Result Value Ref Range    PT 14.3 12.0 - 14.6 sec    INR 1.14 (H) 0.87 - 1.13   APTT    Collection Time: 18  3:57 PM   Result Value Ref Range    APTT 27.6 24.7 - 36.0 sec        Imaging:  CT-HEAD W/O   Final Result      1.  Diffuse atrophy and white matter changes.   2.  No acute intracranial hemorrhage or territorial infarct.   3.  Multiple small chronic infarcts.      OUTSIDE IMAGES-DX CHEST   Final Result              EKG : personally reviewed by me SR diffuse repol changes similar to prior    All pertinent features of laboratory and imaging reviewed including primary images where applicable    Assessment / Plan:  POSITIVE troponin -probable ACS given his reported chest pain I guess with his mobility cannot exclude a PE  heparin gtts  Aspirin  Trend troponin  Await improvement in MS consider brain MRI  Check echo tonight keep n.p.o. in case he requires a cath    Plan of care discussed with patient and hospitalist    It is my pleasure to participate in the care of Mr. Pham.  Please do not hesitate to contact me with questions or concerns.    Perry Patel MD PhD FAC  Cardiologist [facility]    4/30/2018    Addendum  The patient was noted to have a systolic ejection murmur on exam consistent with prior history of moderate aortic stenosis on prior echocardiogram which was reviewed at the time of my initial consult I discussed with Dr. Carter as well

## 2018-05-01 NOTE — H&P
Hospital Medicine History and Physical    Date of Service  4/30/2018    Chief Complaint  Chief Complaint   Patient presents with   • MI (Non ST Segment Elevation MI)     PT TRANSFER FROM [facility] FOR CP AND TROPONIN OF 3.54 WITH ST DEPRESSION, RECEIVED ASPIRIN, MORPHINE AND NTG PTA.       History of Presenting Illness  72 y.o. male who presented 4/30/2018 with a blood chief complaint. Patient lives in [facility] and was transferred to [facility] ER where he was found to have an elevated troponin at 3.5 in the setting of ongoing midsternal chest pain and received aspirin 324 mg ×1 as well as IV morphine 4 mg as well as Nitrostat. He was transferred here under the idea of having a ACS event. Here in the ER the patient was quite sedated but has been morphine metabolized he came to but still seems to have obvious memory issues and this is unclear if related to his baseline Parkinson's disease. He underwent a CT head without contrast in the emergency room that shows no evidence of acute bleed or mass. Patient states that he was in his usual state of health but developed acute onset of chest pain this morning that was midsternal did not radiate with associated nausea and vomiting but no sweatiness. He claims he might have had heart attacks in the past and told me he went to the [facility] many years ago but it's unclear exactly what procedure he underwent. [facility] he [facility] a bilateral cardiothoracic surgery and it is not entirely clear what is meant by that at this time. Patient denies any chest pain now and says he feels somewhat weak but otherwise has no other issues. He feels that his Parkinson's disease is at its baseline.    Primary Care Physician  Ze Whitt M.D.    Code Status  fc/fc    Review of Systems  Review of Systems   Constitutional: Negative for chills, fever and weight loss.   HENT: Negative for hearing loss and tinnitus.    Eyes: Negative  for blurred vision.   Respiratory: Positive for shortness of breath. Negative for cough, hemoptysis and sputum production.    Cardiovascular: Positive for chest pain. Negative for palpitations and leg swelling.   Gastrointestinal: Positive for nausea and vomiting. Negative for abdominal pain and heartburn.   Genitourinary: Negative for dysuria and urgency.   Musculoskeletal: Negative for myalgias and neck pain.   Skin: Negative for itching.   Neurological: Positive for tremors (at his baseline) and weakness. Negative for dizziness, tingling, focal weakness, loss of consciousness and headaches.   Endo/Heme/Allergies: Negative for environmental allergies. Does not bruise/bleed easily.   Psychiatric/Behavioral: Negative for depression.   All other systems reviewed and are negative.         Past Medical History  Past Medical History:   Diagnosis Date   • colon Cancer     surgery 8/2006, now in remission, testicular   • Hypertension    • Infectious disease     had shingles December 2013   • Parkinson's disease    • Seizure disorder (CMS-HCC)     last one ? 2010, from head injury   • Testicular cancer (CMS-HCC)        Surgical History  Past Surgical History:   Procedure Laterality Date   • CERVICAL FUSION POSTERIOR  9/4/2014    Performed by Eligio Bella M.D. at SURGERY [facility] ORS       Medications  No current facility-administered medications on file prior to encounter.      Current Outpatient Prescriptions on File Prior to Encounter   Medication Sig Dispense Refill   • doxazosin (CARDURA) 2 MG Tab Take 2 mg by mouth every day.     • aspirin EC (ECOTRIN) 81 MG Tablet Delayed Response Take 81 mg by mouth every day.     • cyanocobalamin (VITAMIN B12) 500 MCG tablet Take 1 Tab by mouth every day. 100 Tab 3   • selegiline (ELDEPRYL) 5 MG TABS Take 5 mg by mouth 2 Times a Day.     • levetiracetam (KEPPRA) 500 MG TABS Take 1,500 mg by mouth 2 times a day.     • gabapentin (NEURONTIN) 300 MG CAPS Take 300 mg by mouth 3  times a day.         Family History  Denies any family history of medical issues    Social History  Social History   Substance Use Topics   • Smoking status: Never Smoker   • Smokeless tobacco: Not on file   • Alcohol use No       Allergies  Allergies   Allergen Reactions   • Nkda [No Known Drug Allergy]         Physical Exam  Laboratory   Hemodynamics  Temp (24hrs), Av.8 °C (98.3 °F), Min:36.5 °C (97.7 °F), Max:37.1 °C (98.7 °F)   Temperature: 37 °C (98.6 °F)  Pulse  Av.8  Min: 82  Max: 107 Heart Rate (Monitored): 86  Blood Pressure : 108/68, NIBP: 114/62      Respiratory      Respiration: (!) 27, Pulse Oximetry: 95 %             Physical Exam   Constitutional: He is oriented to person, place, and time. He appears well-developed and well-nourished. No distress.   Somewhat slow to answer, but does do so appropriately in due time   HENT:   Head: Normocephalic and atraumatic.   Mouth/Throat: No oropharyngeal exudate.   Eyes: Pupils are equal, round, and reactive to light. No scleral icterus.   Neck: Normal range of motion. Neck supple. No thyromegaly present.   Cardiovascular: Normal rate, regular rhythm, normal heart sounds and intact distal pulses.    No murmur heard.  Pulmonary/Chest: Effort normal and breath sounds normal. No respiratory distress. He has no wheezes.   Abdominal: Soft. Bowel sounds are normal. He exhibits no distension. There is no tenderness.   Musculoskeletal: Normal range of motion. He exhibits no edema or tenderness.   Neurological: He is alert and oriented to person, place, and time. No cranial nerve deficit. Coordination abnormal.   Baseline resting tremor noted, most prominent in the RUE   Skin: Skin is warm and dry. No rash noted.   Psychiatric: He has a normal mood and affect.   Nursing note and vitals reviewed.              Recent Labs      18   1557   APTT  27.6   INR  1.14*               Imaging  Echocardiogram Comp W/O Cont         DX-CHEST-PORTABLE (1 VIEW)   Final  Result         1.  Pulmonary edema and/or infiltrates are identified, which appear somewhat increased since the prior exam.      CT-HEAD W/O   Final Result      1.  Diffuse atrophy and white matter changes.   2.  No acute intracranial hemorrhage or territorial infarct.   3.  Multiple small chronic infarcts.      OUTSIDE IMAGES-DX CHEST   Final Result      MR-BRAIN-W/O    (Results Pending)       EKG personally reviewed by me and when compared to the outside hospital EKG shows normal sinus rhythm and new T-wave inversions and ST depression in V3 through V6 but otherwise heart rate is 83    Assessment/Plan     I anticipate this patient will require at least two midnights for appropriate medical management, necessitating inpatient admission.    * NSTEMI (non-ST elevated myocardial infarction) (HCC)- (present on admission)   Assessment & Plan    -Appears to be real with dynamic EKG changes noted on EKG but no active chest pain at this time  -Dr. Patel of cardiology is being consulted  -Initiate weight based heparin drip along with aspirin 324 mg daily and atorvastatin 40 mg at night  -Admit to telemetry and monitor and check echocardiogram in the morning, he did consider angiogram  -Check echocardiogram on telemetry        Chest pain- (present on admission)   Assessment & Plan    -See above        Urge incontinence- (present on admission)   Assessment & Plan    -Continue outpatient medication        Chronic pain syndrome- (present on admission)   Assessment & Plan    -Continue outpatient narcotics        Hypothyroidism- (present on admission)   Assessment & Plan    0 TSH is normal        Memory impairment- (present on admission)   Assessment & Plan    -Unclear if there is baseline or not, he does have underlying long-standing Parkinson's disease and there is no clear evidence of infection at this time and he could've been overly sedated from receiving IV morphine en route to our emergency room  -CT head without contrast  is negative for bleed or brain mass but does show advanced cerebral atrophy  -Check MRI of the brain without contrast        Parkinson's disease (HCC)- (present on admission)   Assessment & Plan    -Appears to be at his baseline, continue outpatient selegiline as well as carbidopa levodopa        Seizure disorder (HCC)- (present on admission)   Assessment & Plan    -Continue outpatient Keppra and gabapentin            VTE prophylaxis heparin weight based gtt.

## 2018-05-01 NOTE — THERAPY
OT orders received. Pt on bedrest. Will attempt again as appropriate and able.    Anette Lynn, OTR/L  Pager: [phone]

## 2018-05-01 NOTE — PROGRESS NOTES
2RN skin check, no wounds noted, skin is intact, sacrum is pink and blanched, patietn turns self side to side.

## 2018-05-01 NOTE — ASSESSMENT & PLAN NOTE
In the setting of parkinson disease  Back to baseline but still appears to have poor decision-making ability and unsafe discharge plan at this time  Evaluate for capacity, psych eval placed

## 2018-05-01 NOTE — PROGRESS NOTES
[facility] Hospitalist Progress Note    Date of Service: 2018    Chief Complaint  72 y.o. male admitted 2018 with chest pain.    Interval Problem Update  Patient initially presented to [facility] for chest pain, noted to have a troponin of 3.54 with ST depressions, transferred here via level of care.  Patient went to cath lab, was noted have coronary artery disease, did receive angioplasty/stent placement of the ostial left circumflex artery.  Patient continues to complain of chest pain.  Patient seen and examined in the ICU, IC care given.  Discussed patient condition and plan with Intensivist, RN, RT and charge nurse / hot rounds.    Stent yesterday  Noted AS  EF 20%  No overnight events  Alert and oriented  Sinus tach  -130  Afebrile  Tolerating diet  300 uop overnight     Consultants/Specialty  Cardiology    Disposition  Patient requires additional treatment in the hospital, unsure if he'll need placement versus home at this time of discharge        Review of Systems   Constitutional: Negative for chills, fever and malaise/fatigue.   HENT: Negative for congestion.    Respiratory: Positive for shortness of breath. Negative for cough, sputum production and stridor.    Cardiovascular: Negative for chest pain, palpitations and leg swelling.   Gastrointestinal: Negative for abdominal pain, constipation, diarrhea, nausea and vomiting.   Genitourinary: Negative for dysuria and urgency.   Musculoskeletal: Negative for falls and myalgias.   Neurological: Negative for dizziness, tingling, loss of consciousness, weakness and headaches.   Psychiatric/Behavioral: Negative for depression and suicidal ideas.   All other systems reviewed and are negative.     Physical Exam  Laboratory/Imaging   Hemodynamics  Temp (24hrs), Av.7 °C (98 °F), Min:36.1 °C (97 °F), Max:37.1 °C (98.7 °F)   Temperature: 36.1 °C (97 °F)  Pulse  Av.3  Min: 82  Max: 114 Heart Rate (Monitored): (!) 114  Blood Pressure : 108/68,  Arterial BP: 121/70, NIBP: 111/69      Respiratory      Respiration: (!) 37, Pulse Oximetry: 95 %        RUL Breath Sounds: Clear, RML Breath Sounds: Clear, RLL Breath Sounds: Diminished, NOA Breath Sounds: Clear, LLL Breath Sounds: Diminished    Fluids    Intake/Output Summary (Last 24 hours) at 05/01/18 0739  Last data filed at 05/01/18 0600   Gross per 24 hour   Intake              300 ml   Output              300 ml   Net                0 ml       Nutrition  Orders Placed This Encounter   Procedures   • Diet Order     Standing Status:   Standing     Number of Occurrences:   1     Order Specific Question:   Diet:     Answer:   Cardiac [6]     Physical Exam   Constitutional: He appears well-developed.  Non-toxic appearance. No distress.   HENT:   Head: Normocephalic and atraumatic.   Mouth/Throat: Oropharynx is clear and moist. No oropharyngeal exudate.   Eyes: Right eye exhibits no discharge. Left eye exhibits no discharge.   Neck: Neck supple. No tracheal deviation, no edema and no erythema present.   Cardiovascular: Normal rate and regular rhythm.  Exam reveals no gallop and no friction rub.    No murmur heard.  Pulmonary/Chest: Effort normal. No stridor. No respiratory distress. He has no wheezes. He has rales. He exhibits no tenderness.   Abdominal: Soft. Bowel sounds are normal. He exhibits no distension. There is no tenderness.   Musculoskeletal: Normal range of motion. He exhibits no edema or tenderness.   Lymphadenopathy:     He has no cervical adenopathy.   Neurological: He is alert.   Some confusion    Skin: Skin is warm and dry. No rash noted. He is not diaphoretic. No erythema.   Psychiatric: He has a normal mood and affect. He is slowed. Cognition and memory are impaired.   Nursing note and vitals reviewed.      Recent Labs      05/01/18   0339   WBC  11.0*   RBC  3.74*   HEMOGLOBIN  11.6*   HEMATOCRIT  35.3*   MCV  94.4   MCH  31.0   MCHC  32.9*   RDW  49.3   PLATELETCT  224   MPV  11.2     Recent  Labs      05/01/18   0339   SODIUM  135   POTASSIUM  4.4   CHLORIDE  107   CO2  17*   GLUCOSE  135*   BUN  22   CREATININE  1.07   CALCIUM  8.4*     Recent Labs      04/30/18   1557   APTT  27.6   INR  1.14*         Recent Labs      05/01/18   0339   TRIGLYCERIDE  129   HDL  33*   LDL  48          Assessment/Plan     * NSTEMI (non-ST elevated myocardial infarction) ([facility])- (present on admission)   Assessment & Plan    - Noted outside facility, transferred here for follow-up care, now status post cardiac cath with stenting  - Medical management and additional recommendations per cardiology        Urge incontinence- (present on admission)   Assessment & Plan    - Continue home meds        Chronic pain syndrome- (present on admission)   Assessment & Plan    - Continue symptomatic care        Hypothyroidism- (present on admission)   Assessment & Plan    - Normal TSH        Memory impairment- (present on admission)   Assessment & Plan    - Back to baseline  - Like to acute event  - No need for MRI brain  - Patient with a history of Parkinson's disease, unable to lay flat for MRI brain regardless, would not be a good study        Parkinson's disease ([facility])- (present on admission)   Assessment & Plan    - Continue Sinemet and selegiline, home meds        Seizure disorder ([facility])- (present on admission)   Assessment & Plan    - No new seizures noted, continue Keppra and gabapentin at home doses          Quality-Core Measures   Reviewed items::  Labs reviewed, Medications reviewed and Radiology images reviewed  DVT prophylaxis - mechanical:  SCDs

## 2018-05-01 NOTE — PROGRESS NOTES
Patient with chest pain R/B MS prior to me evaluating patient. Currently pain free. ECG minimal change.  A and P  NSTEMI continue ASA and Heparin. If pain recurs then to cath lab tonight.

## 2018-05-01 NOTE — PROGRESS NOTES
Pt transport at bedside to take pt to cath lab. Pt belongings and chart sent with pt to cath lab. Pt son updated regarding pt condition at this time. Pt off unit to cath lab without incident.

## 2018-05-01 NOTE — PROGRESS NOTES
Pt called RN into room again complaining of chest pain this time 8/10 non radiating mid sternal. Same pain same spot. Cards paged, this RN was advised they would be taking pt to cath lab. All pt belonging gathered, chart and meds gathered, pt placed on pads and zoll monitor. Awaiting transport

## 2018-05-01 NOTE — PROGRESS NOTES
Patyient kicked leg, arterial waveform flat, rn trouble shooting artline, noted small hematoma, MD bobo updated, orders received to removed both arterial line and swan line, manual pressure applied, hemostasis at 0515 and 0530 respectively, soft sites, no oozing noted, patient resting.

## 2018-05-01 NOTE — ED NOTES
PT RESTING COMFORTABLY, NSR ON MONITOR, SKIN PWD, DENIES CP, HEPARIN BOLUS GIVEN AND GTT STARTED.

## 2018-05-01 NOTE — PROCEDURES
DATE OF SERVICE:  04/30/2018    REFERRING PHYSICIAN:  Marquez Dominique MD    PROCEDURES:  1.  Right heart catheterization.  2.  Left heart catheterization.  3.  Coronary angiography.  4.  PTCA/stent placement of the ostial left circumflex artery.  5.  Ultrasound-guided arterial and venous access.  6.  Angio-Seal closure.    PREPROCEDURE DIAGNOSIS:  Non-ST elevation myocardial infarction.    POSTPROCEDURE DIAGNOSES:  1.  Coronary artery disease with patent stent of the ostial left main into the   proximal left anterior descending artery with ostial left main 40% eccentric   in-stent restenosis and high-grade ostial left circumflex in-stent restenosis   at the left anterior descending bifurcation of a left dominant system.  2.  Successful percutaneous transluminal coronary angioplasty/stent placement   of the ostial left circumflex artery with 2.5x8 mm Synergy drug-eluting stent.  3.  Percutaneous transluminal coronary angioplasty of the distal left main and   proximal left anterior descending artery.  4.  Reduced left ventricular systolic function with ejection fraction of 20%.  5.  Elevated left ventricular end-diastolic pressure.  6.  Elevated right heart pressure with PA systolic pressure of 80 mmHg.  7.  Probable low flow, low gradient, severe aortic stenosis with peak-to-peak   gradient of 30 mmHg, mean gradient of 26 mmHg, calculated CRUZITO of 0.60 cm2.  8.  Successful Angio-Seal closure.    INDICATION:  The patient is a 72-year-old  with past medical history   significant for stent placement of the left main coronary artery with   bifurcation into a circumflex artery of left dominant system.  Patient was   admitted to [facility] with chest pain and ruled in for non-STEMI.    He was experiencing severe recurrence of his chest pain and was brought to   cardiac catheterization laboratory.    PROCEDURE:  The patient was brought to the cardiac catheterization laboratory.    He was prepped and draped  in the usual sterile manner.  The right inguinal   area was anesthetized with 2% Xylocaine.  A 6-[place] sheath was inserted into   the right femoral artery using the modified Seldinger technique.  A 6-[place]   pigtail catheter was positioned into the left ventricle.  Left angiography was   performed.  Left ventricular and aortic pressure measurements were measured   on pullback.  This was exchanged for a JL-4 catheter, which was positioned   into the left main coronary artery.  Coronary angiography was performed.  This   was exchanged for a 3DRC catheter, which was positioned into the right   coronary artery.  Coronary angiography was performed.  This catheter was   exchanged for an EBU 3.5 guide catheter, which was positioned into the left   main coronary artery.  IV Angiomax was started.  A Prowater wire was   positioned into the left anterior descending artery for protection.  A second   P[place]ater wire was used to cross identify the in-stent restenosis of the left   circumflex artery.  The stenosis was predilated with 2.5x15 mm Emerge balloon.    A 2.5x8 mm Synergy drug-eluting stent was successfully positioned and   deployed.  To ensure non-extension of the stent into the left anterior   descending artery, the distal left main into the ostial proximal left anterior   descending artery was dilated with 3.0x8 mm NC Emerge balloon.  The patient   tolerated the procedure well with resolution of his chest pain.  He was then   transferred to [facility] in a stable condition.    HEMODYNAMIC DATA:  Hemodynamic data shows right heart pressures of RA 20 mmHg,   RV 80/20 with RVEDP of 30 mmHg, PA 80/55 with mean of 60 mmHg.  Pulmonary   wedge 40 mmHg.  Cardiac output by thermodilution 3.29.  Cardiac index 1.5.    Left heart pressures, AO is 100/60 with mean of 80 mmHg and /10 with   LVEDP of 35 mmHg.    Aortic valve peak-to-peak gradient 30 mmHg, mean gradient 26 mmHg, calculated   CRUZITO of 0.62 cm2.    LEFT VENTRICULOGRAM:  A  10 mL of contrast was delivered for 3 seconds.    Ejection fraction was estimated to be 20%.  The left ventricle was dilated in   size with global hypokinesis.    ANGIOGRAM:  LEFT MAIN CORONARY ARTERY:  Left main coronary artery is a long large caliber   vessel with a patent stent noted with extension into the proximal left   anterior descending artery.  Ostial portion, there are eccentric 40% stenosis.    There is a stent also present into the ostial proximal left circumflex   artery with high-grade in-stent restenosis.  LEFT ANTERIOR DESCENDING ARTERY:  Left anterior descending artery is a long   moderate-caliber vessel, which wraps around the apex.  Mid portion of the   vessel, there is eccentric 30% stenosis.  There are 3 small-to-moderate   diagonal branches with ostial concentric 60% stenosis.  LEFT CIRCUMFLEX ARTERY:  Left circumflex artery is a dominant,   small-to-moderate caliber vessel with ostial in-stent 99% stenosis.  Beyond   this, there is a long moderate bifurcating obtuse marginal branch noted as   well as distal posterior lateral branch and posterior descending artery, all   noted to be free of disease.  RIGHT CORONARY ARTERY:  Right coronary artery is a nondominant small caliber   vessel with ostial concentric 50% stenosis.    PERCUTANEOUS INTERVENTION:  1.  Ostial left circumflex artery 99% in-stent restenosis with 0% residual.    Predilatation with 2.5x15 mm Emerge balloon.  Stent with 2.5x8 mm Synergy   drug-eluting stent.  2.  PTCA of the distal left main into proximal left anterior descending artery   with 3.0x8 mm NC Emerge balloon.    IMPRESSION:  1.  Coronary artery disease with patent stent of the ostial left main into the   proximal left anterior descending artery with ostial left main 40% eccentric   in-stent restenosis and high-grade ostial left circumflex in-stent restenosis   at the left anterior descending bifurcation of a left dominant system.  2.  Successful percutaneous  transluminal coronary angioplasty/stent placement   of the ostial left circumflex artery with 2.5x8 mm Synergy drug-eluting stent.  3.  Percutaneous transluminal coronary angioplasty of the distal left main and   proximal left anterior descending artery.  4.  Reduced left ventricular systolic function with ejection fraction of 20%.  5.  Elevated left ventricular end-diastolic pressure.  6.  Elevated right heart pressure with PA systolic pressure of 80 mmHg.  7.  Probable low flow, low gradient, severe aortic stenosis with peak-to-peak   gradient of 30 mmHg, mean gradient of 26 mmHg, calculated CRUZITO of 0.60 cm2.  8.  Successful Angio-Seal closure.    RECOMMENDATIONS:  Recommend medical therapy with addition of Brilinta,   evaluation for his aortic stenosis with dobutamine stress echo and   consideration of TAVR.       ____________________________________     MD ANIYAH GUEVARAI / DAISY    DD:  05/01/2018 01:39:49  DT:  05/01/2018 02:09:49    D#:  [id number]  Job#:  [id number]

## 2018-05-02 ENCOUNTER — APPOINTMENT (OUTPATIENT)
Dept: RADIOLOGY | Facility: MEDICAL CENTER | Age: 73
DRG: 246 | End: 2018-05-02
Attending: INTERNAL MEDICINE
Payer: MEDICARE

## 2018-05-02 PROBLEM — D72.829 LEUKOCYTOSIS: Status: ACTIVE | Noted: 2018-05-02

## 2018-05-02 PROBLEM — I35.0 SEVERE AORTIC STENOSIS: Status: ACTIVE | Noted: 2018-05-02

## 2018-05-02 PROBLEM — I50.21 ACUTE SYSTOLIC (CONGESTIVE) HEART FAILURE (HCC): Status: ACTIVE | Noted: 2018-05-02

## 2018-05-02 PROBLEM — N39.41 URGE INCONTINENCE: Status: RESOLVED | Noted: 2018-04-30 | Resolved: 2018-05-02

## 2018-05-02 PROBLEM — D64.9 ANEMIA: Status: ACTIVE | Noted: 2018-05-02

## 2018-05-02 PROBLEM — G89.4 CHRONIC PAIN SYNDROME: Status: RESOLVED | Noted: 2018-04-30 | Resolved: 2018-05-02

## 2018-05-02 PROBLEM — E03.9 HYPOTHYROIDISM: Status: RESOLVED | Noted: 2018-04-30 | Resolved: 2018-05-02

## 2018-05-02 PROBLEM — G93.40 ENCEPHALOPATHY: Status: ACTIVE | Noted: 2018-05-02

## 2018-05-02 PROBLEM — R41.89 COGNITIVE DECLINE: Status: ACTIVE | Noted: 2018-04-30

## 2018-05-02 PROBLEM — R06.2 WHEEZING: Status: ACTIVE | Noted: 2018-05-02

## 2018-05-02 LAB
ALBUMIN SERPL BCP-MCNC: 3.4 G/DL (ref 3.2–4.9)
ALBUMIN/GLOB SERPL: 1 G/DL
ALP SERPL-CCNC: 80 U/L (ref 30–99)
ALT SERPL-CCNC: 14 U/L (ref 2–50)
ANION GAP SERPL CALC-SCNC: 11 MMOL/L (ref 0–11.9)
AST SERPL-CCNC: 149 U/L (ref 12–45)
BASOPHILS # BLD AUTO: 0.2 % (ref 0–1.8)
BASOPHILS # BLD: 0.02 K/UL (ref 0–0.12)
BILIRUB SERPL-MCNC: 0.7 MG/DL (ref 0.1–1.5)
BUN SERPL-MCNC: 39 MG/DL (ref 8–22)
CALCIUM SERPL-MCNC: 9.1 MG/DL (ref 8.5–10.5)
CHLORIDE SERPL-SCNC: 109 MMOL/L (ref 96–112)
CO2 SERPL-SCNC: 19 MMOL/L (ref 20–33)
CREAT SERPL-MCNC: 1.26 MG/DL (ref 0.5–1.4)
EOSINOPHIL # BLD AUTO: 0.02 K/UL (ref 0–0.51)
EOSINOPHIL NFR BLD: 0.2 % (ref 0–6.9)
ERYTHROCYTE [DISTWIDTH] IN BLOOD BY AUTOMATED COUNT: 48.5 FL (ref 35.9–50)
GLOBULIN SER CALC-MCNC: 3.4 G/DL (ref 1.9–3.5)
GLUCOSE SERPL-MCNC: 153 MG/DL (ref 65–99)
HCT VFR BLD AUTO: 35.5 % (ref 42–52)
HGB BLD-MCNC: 11.9 G/DL (ref 14–18)
IMM GRANULOCYTES # BLD AUTO: 0.07 K/UL (ref 0–0.11)
IMM GRANULOCYTES NFR BLD AUTO: 0.6 % (ref 0–0.9)
LYMPHOCYTES # BLD AUTO: 0.92 K/UL (ref 1–4.8)
LYMPHOCYTES NFR BLD: 7.5 % (ref 22–41)
MAGNESIUM SERPL-MCNC: 2.3 MG/DL (ref 1.5–2.5)
MCH RBC QN AUTO: 30.7 PG (ref 27–33)
MCHC RBC AUTO-ENTMCNC: 33.5 G/DL (ref 33.7–35.3)
MCV RBC AUTO: 91.7 FL (ref 81.4–97.8)
MONOCYTES # BLD AUTO: 0.8 K/UL (ref 0–0.85)
MONOCYTES NFR BLD AUTO: 6.5 % (ref 0–13.4)
NEUTROPHILS # BLD AUTO: 10.45 K/UL (ref 1.82–7.42)
NEUTROPHILS NFR BLD: 85 % (ref 44–72)
NRBC # BLD AUTO: 0 K/UL
NRBC BLD-RTO: 0 /100 WBC
PHOSPHATE SERPL-MCNC: 3.6 MG/DL (ref 2.5–4.5)
PLATELET # BLD AUTO: 231 K/UL (ref 164–446)
PMV BLD AUTO: 11.7 FL (ref 9–12.9)
POTASSIUM SERPL-SCNC: 4.7 MMOL/L (ref 3.6–5.5)
PROT SERPL-MCNC: 6.8 G/DL (ref 6–8.2)
RBC # BLD AUTO: 3.87 M/UL (ref 4.7–6.1)
SODIUM SERPL-SCNC: 139 MMOL/L (ref 135–145)
WBC # BLD AUTO: 12.3 K/UL (ref 4.8–10.8)

## 2018-05-02 PROCEDURE — A9270 NON-COVERED ITEM OR SERVICE: HCPCS | Performed by: INTERNAL MEDICINE

## 2018-05-02 PROCEDURE — 83735 ASSAY OF MAGNESIUM: CPT

## 2018-05-02 PROCEDURE — 700102 HCHG RX REV CODE 250 W/ 637 OVERRIDE(OP): Performed by: INTERNAL MEDICINE

## 2018-05-02 PROCEDURE — 700105 HCHG RX REV CODE 258: Performed by: FAMILY MEDICINE

## 2018-05-02 PROCEDURE — 700111 HCHG RX REV CODE 636 W/ 250 OVERRIDE (IP): Performed by: INTERNAL MEDICINE

## 2018-05-02 PROCEDURE — 87040 BLOOD CULTURE FOR BACTERIA: CPT | Mod: 91

## 2018-05-02 PROCEDURE — 99233 SBSQ HOSP IP/OBS HIGH 50: CPT | Performed by: INTERNAL MEDICINE

## 2018-05-02 PROCEDURE — 85025 COMPLETE CBC W/AUTO DIFF WBC: CPT

## 2018-05-02 PROCEDURE — 84100 ASSAY OF PHOSPHORUS: CPT

## 2018-05-02 PROCEDURE — 700112 HCHG RX REV CODE 229: Performed by: INTERNAL MEDICINE

## 2018-05-02 PROCEDURE — 80053 COMPREHEN METABOLIC PANEL: CPT

## 2018-05-02 PROCEDURE — 36415 COLL VENOUS BLD VENIPUNCTURE: CPT

## 2018-05-02 PROCEDURE — 71045 X-RAY EXAM CHEST 1 VIEW: CPT

## 2018-05-02 PROCEDURE — 770020 HCHG ROOM/CARE - TELE (206)

## 2018-05-02 RX ORDER — IPRATROPIUM BROMIDE AND ALBUTEROL SULFATE 2.5; .5 MG/3ML; MG/3ML
3 SOLUTION RESPIRATORY (INHALATION)
Status: DISCONTINUED | OUTPATIENT
Start: 2018-05-02 | End: 2018-05-04

## 2018-05-02 RX ORDER — IPRATROPIUM BROMIDE AND ALBUTEROL SULFATE 2.5; .5 MG/3ML; MG/3ML
3 SOLUTION RESPIRATORY (INHALATION)
Status: DISCONTINUED | OUTPATIENT
Start: 2018-05-02 | End: 2018-05-02

## 2018-05-02 RX ORDER — SODIUM CHLORIDE 9 MG/ML
500 INJECTION, SOLUTION INTRAVENOUS ONCE
Status: COMPLETED | OUTPATIENT
Start: 2018-05-02 | End: 2018-05-02

## 2018-05-02 RX ORDER — QUETIAPINE FUMARATE 25 MG/1
50 TABLET, FILM COATED ORAL ONCE
Status: COMPLETED | OUTPATIENT
Start: 2018-05-02 | End: 2018-05-02

## 2018-05-02 RX ORDER — SODIUM CHLORIDE 9 MG/ML
INJECTION, SOLUTION INTRAVENOUS
Status: DISCONTINUED
Start: 2018-05-02 | End: 2018-05-02

## 2018-05-02 RX ORDER — QUETIAPINE FUMARATE 25 MG/1
100 TABLET, FILM COATED ORAL EVERY EVENING
Status: DISCONTINUED | OUTPATIENT
Start: 2018-05-02 | End: 2018-05-16 | Stop reason: HOSPADM

## 2018-05-02 RX ADMIN — CITALOPRAM HYDROBROMIDE 20 MG: 20 TABLET ORAL at 06:39

## 2018-05-02 RX ADMIN — Medication 500 MCG: at 06:35

## 2018-05-02 RX ADMIN — QUETIAPINE FUMARATE 50 MG: 25 TABLET ORAL at 11:42

## 2018-05-02 RX ADMIN — GABAPENTIN 300 MG: 300 CAPSULE ORAL at 17:34

## 2018-05-02 RX ADMIN — SODIUM CHLORIDE 500 ML: 9 INJECTION, SOLUTION INTRAVENOUS at 01:58

## 2018-05-02 RX ADMIN — ASPIRIN 81 MG: 81 TABLET, COATED ORAL at 06:40

## 2018-05-02 RX ADMIN — METOPROLOL TARTRATE 50 MG: 50 TABLET, FILM COATED ORAL at 17:40

## 2018-05-02 RX ADMIN — ATORVASTATIN CALCIUM 40 MG: 40 TABLET, FILM COATED ORAL at 17:32

## 2018-05-02 RX ADMIN — CARBIDOPA AND LEVODOPA 1.5 TABLET: 25; 100 TABLET ORAL at 06:39

## 2018-05-02 RX ADMIN — QUETIAPINE FUMARATE 100 MG: 25 TABLET ORAL at 21:32

## 2018-05-02 RX ADMIN — LEVETIRACETAM 1500 MG: 500 TABLET, FILM COATED ORAL at 17:32

## 2018-05-02 RX ADMIN — SELEGILINE HYDROCHLORIDE 5 MG: 5 TABLET ORAL at 17:39

## 2018-05-02 RX ADMIN — CARBIDOPA AND LEVODOPA 1.5 TABLET: 25; 100 TABLET ORAL at 21:39

## 2018-05-02 RX ADMIN — LEVETIRACETAM 1500 MG: 500 TABLET, FILM COATED ORAL at 06:36

## 2018-05-02 RX ADMIN — TICAGRELOR 90 MG: 90 TABLET ORAL at 17:38

## 2018-05-02 RX ADMIN — GABAPENTIN 300 MG: 300 CAPSULE ORAL at 06:42

## 2018-05-02 RX ADMIN — SELEGILINE HYDROCHLORIDE 5 MG: 5 TABLET ORAL at 06:41

## 2018-05-02 RX ADMIN — METOPROLOL TARTRATE 50 MG: 50 TABLET, FILM COATED ORAL at 06:40

## 2018-05-02 RX ADMIN — CEFTRIAXONE 2 G: 2 INJECTION, POWDER, FOR SOLUTION INTRAMUSCULAR; INTRAVENOUS at 21:59

## 2018-05-02 RX ADMIN — TICAGRELOR 90 MG: 90 TABLET ORAL at 06:36

## 2018-05-02 RX ADMIN — CARBIDOPA AND LEVODOPA 1.5 TABLET: 25; 100 TABLET ORAL at 17:33

## 2018-05-02 RX ADMIN — DOCUSATE CALCIUM 240 MG: 240 CAPSULE, LIQUID FILLED ORAL at 06:42

## 2018-05-02 ASSESSMENT — PATIENT HEALTH QUESTIONNAIRE - PHQ9
2. FEELING DOWN, DEPRESSED, IRRITABLE, OR HOPELESS: NOT AT ALL
SUM OF ALL RESPONSES TO PHQ9 QUESTIONS 1 AND 2: 0
1. LITTLE INTEREST OR PLEASURE IN DOING THINGS: NOT AT ALL

## 2018-05-02 ASSESSMENT — PAIN SCALES - WONG BAKER
WONGBAKER_NUMERICALRESPONSE: DOESN'T HURT AT ALL
WONGBAKER_NUMERICALRESPONSE: DOESN'T HURT AT ALL

## 2018-05-02 ASSESSMENT — PAIN SCALES - GENERAL
PAINLEVEL_OUTOF10: 0
PAINLEVEL_OUTOF10: 3
PAINLEVEL_OUTOF10: 0

## 2018-05-02 NOTE — PROGRESS NOTES
Paged MD Cruz regarding parameters for all BP medications for this patient. Per MD Cruz hold  Cardura, Lasix, Lisinopril and Spironolactone for SBP <110.

## 2018-05-02 NOTE — PROGRESS NOTES
[facility] Cardiology Follow-up Consult Note  Date of note:    5/1/2018      Consulting Physician: Evert Cantu M.D.    Patient ID:  Name:   Prince Pham   YOB: 1945  Age:   72 y.o.  male   MRN:   [id number]    Chief Complaint   Patient presents with   • MI (Non ST Segment Elevation MI)     PT TRANSFER FROM [facility] FOR CP AND TROPONIN OF 3.54 WITH ST DEPRESSION, RECEIVED ASPIRIN, MORPHINE AND NTG PTA.       Interim Events:  Events noted from last evening status post successful stenting to the circumflex unfortunately has severely reduced ejection fraction and possible severe aortic stenosis    He feels well this a.m. no recurrent chest pain      ROS  No NV, No Bleeding, No dizziness   All other review of systems reviewed and negative.    Past medical, surgical, social, and family history reviewed and unchanged from admission except as noted in assessment and plan.    Medications: Reviewed in MAR    Current Facility-Administered Medications:   •  ondansetron (ZOFRAN) syringe/vial injection 4 mg, 4 mg, Intravenous, Q4HRS PRN, Evert Cantu M.D.  •  dexamethasone (DECADRON) injection 4 mg, 4 mg, Intravenous, Once PRN, Evert Cantu M.D.  •  diphenhydrAMINE (BENADRYL) injection 25 mg, 25 mg, Intravenous, Q6HRS PRN, Evert Cantu M.D.  •  haloperidol lactate (HALDOL) injection 1 mg, 1 mg, Intravenous, Q6HRS PRN, Evert Cantu M.D.  •  scopolamine (TRANSDERM-SCOP) patch 1 Patch, 1 Patch, Transdermal, Q72HRS PRN, Evert Cantu M.D.  •  aspirin EC (ECOTRIN) tablet 81 mg, 81 mg, Oral, DAILY, Evert Cantu M.D., 81 mg at 05/01/18 0832  •  ticagrelor (BRILINTA) tablet 90 mg, 90 mg, Oral, BID, Perry Patel M.D., 90 mg at 05/01/18 1109  •  [START ON 5/2/2018] furosemide (LASIX) tablet 20 mg, 20 mg, Oral, Q DAY, Perry Patel M.D.  •  carbidopa-levodopa (SINEMET)  MG tablet 1.5 Tab, 1.5 Tab, Oral, 4X/DAY, Tima Carter M.D., 1.5 Tab at 05/01/18 0834  •  citalopram  (CELEXA) tablet 20 mg, 20 mg, Oral, DAILY, Tima Carter M.D., 20 mg at 05/01/18 0831  •  cyanocobalamin (VITAMIN B-12) tablet 500 mcg, 500 mcg, Oral, DAILY, Tima Carter M.D., 500 mcg at 05/01/18 0832  •  docusate calcium (SURFAK) capsule 240 mg, 240 mg, Oral, BID, Tima Carter M.D., 240 mg at 05/01/18 0831  •  doxazosin (CARDURA) tablet 2 mg, 2 mg, Oral, DAILY, Tima Carter M.D., 2 mg at 05/01/18 0831  •  gabapentin (NEURONTIN) capsule 300 mg, 300 mg, Oral, TID, Tima Carter M.D., 300 mg at 05/01/18 1602  •  levETIRAcetam (KEPPRA) tablet 1,500 mg, 1,500 mg, Oral, BID, Tima Carter M.D., 1,500 mg at 05/01/18 0831  •  meclizine (ANTIVERT) tablet 12.5 mg, 12.5 mg, Oral, TID PRN, Tima Carter M.D.  •  metoprolol (LOPRESSOR) tablet 50 mg, 50 mg, Oral, BID, Tima Carter M.D., 50 mg at 05/01/18 0832  •  selegiline (ELDEPRYL) tablet 5 mg, 5 mg, Oral, BID, Tima Carter M.D., 5 mg at 05/01/18 0830  •  senna-docusate (PERICOLACE or SENOKOT S) 8.6-50 MG per tablet 2 Tab, 2 Tab, Oral, BID, 2 Tab at 05/01/18 0832 **AND** polyethylene glycol/lytes (MIRALAX) PACKET 1 Packet, 1 Packet, Oral, QDAY PRN **AND** magnesium hydroxide (MILK OF MAGNESIA) suspension 30 mL, 30 mL, Oral, QDAY PRN **AND** bisacodyl (DULCOLAX) suppository 10 mg, 10 mg, Rectal, QDAY PRN, Tima Carter M.D.  •  Respiratory Care per Protocol, , Nebulization, Continuous RT, Tima Carter M.D.  •  acetaminophen (TYLENOL) tablet 650 mg, 650 mg, Oral, Q6HRS PRN, Tima Carter M.D.  •  nitroglycerin (NITROSTAT) tablet 0.4 mg, 0.4 mg, Sublingual, Q5 MIN PRN, Tima Carter M.D., 0.4 mg at 04/30/18 2007  •  atorvastatin (LIPITOR) tablet 40 mg, 40 mg, Oral, Q EVENING, Tima Carter M.D., Stopped at 04/30/18 2100  •  ondansetron (ZOFRAN ODT) dispertab 4 mg, 4 mg, Oral, Q4HRS PRN, Tima Carter M.D.  Allergies   Allergen Reactions   • Nkda [No Known Drug Allergy]        Physical Exam  Body mass index is  "30.97 kg/m². Blood pressure 108/68, pulse 98, temperature 36.3 °C (97.4 °F), resp. rate 17, height 1.778 m (5' 10\"), weight 97.9 kg (215 lb 13.3 oz), SpO2 98 %. Vitals:    18 1300 18 1400 18 1500 18 1600   BP:       Pulse: 100 (!) 107 (!) 118 98   Resp: (!) 26 (!) 23 (!) 44 17   Temp:       SpO2: 96% 98% 98% 98%   Weight:       Height:        Oxygen Therapy:  Pulse Oximetry: 98 %, O2 (LPM): 4, O2 Delivery: Silicone Nasal Cannula    General: Well appearing and in no apparent distress  Eyes: nl conjunctiva  ENT: OP clear  Neck: no JVD   Lungs: normal respiratory effort, CTAB  Heart: RRR, he does have a systolic ejection murmur  EXT no edema bilateral lower extremities. + pedal pulses. no cyanosis  Abdomen: soft, non tender, non distended,  Neurological: No focal sensory deficits  Psychiatric: Appropriate affect, A/O x 3  Skin: Warm extremities    Labs (personally reviewed and notable for):   Recent Results (from the past 24 hour(s))   EKG - STAT Once    Collection Time: 18  8:14 PM   Result Value Ref Range    Report       [facility]    Test Date:  2018  Pt Name:    YUKI LOERA               Department:   MRN:        [id number]                      Room:       [facility]  Gender:     Male                         Technician: RON  :        1945                   Requested By:OLAMIDE ZALDIVAR  Order #:    [id number]                    Reading MD: Stanley Amaya MD    Measurements  Intervals                                Axis  Rate:       95                           P:          55  WA:         164                          QRS:        27  QRSD:       102                          T:          22  QT:         414  QTc:        521    Interpretive Statements  SINUS RHYTHM  BORDERLINE LOW VOLTAGE, EXTREMITY LEADS  ABNORMAL R-WAVE PROGRESSION, EARLY TRANSITION  ST DEPR, CONSIDER ISCHEMIA, ANTERIOR LEADS  PROLONGED QT INTERVAL  Compared to ECG 2018 15:47:41  Possible ischemia " now present    Electronically Signed On 2018 7:20:28 PDT by Stanley Amaya MD     Troponin - STAT Once    Collection Time: 18  8:15 PM   Result Value Ref Range    Troponin I 6.52 (H) 0.00 - 0.04 ng/mL   Echocardiogram Comp W/O Cont    Collection Time: 18  8:33 PM   Result Value Ref Range    Eject.Frac. MOD BP 27.31     Eject.Frac. MOD 4C 31.79     Eject.Frac. MOD 2C 23.87    POC ACT (resulted by nursing)    Collection Time: 18 12:11 AM   Result Value Ref Range    Istat Activated Clotting Time 351 (H) 74 - 137 sec   EKG    Collection Time: 18 12:45 AM   Result Value Ref Range    Report       [facility]    Test Date:  2018  Pt Name:    YUKI LOERA               Department: 183  MRN:        [id number]                      Room:       [facility]  Gender:     Male                         Technician: [facility]  :        1945                   Requested By:WILMER HUDSON  Order #:    [id number]                    Reading MD: Stanley Amaya MD    Measurements  Intervals                                Axis  Rate:       124                          P:          65  VA:         101                          QRS:        56  QRSD:       99                           T:          247  QT:         308  QTc:        443    Interpretive Statements  SINUS TACHYCARDIA  LOW VOLTAGE, EXTREMITY LEADS  REPOL ABNRM SUGGESTS ISCHEMIA, DIFFUSE LEADS  Compared to ECG 2018 20:14:53  Sinus rhythm no longer present  Prolonged QT interval no longer present  Possible ischemia still present  st changes have worsened    Electronically Signed On 2018 7:26:50 PDT by Stanley Amaya MD     ECHOCARDIOGRAM LTD W/O CONT    Collection Time: 18  1:09 AM   Result Value Ref Range    Eject.Frac. MOD BP 13.89     Eject.Frac. MOD 4C 16.94     Eject.Frac. MOD 2C 13.15     Left Ventrical Ejection Fraction 15    CBC WITH DIFFERENTIAL    Collection Time: 18  3:39 AM   Result Value Ref Range    WBC 11.0 (H) 4.8 -  10.8 K/uL    RBC 3.74 (L) 4.70 - 6.10 M/uL    Hemoglobin 11.6 (L) 14.0 - 18.0 g/dL    Hematocrit 35.3 (L) 42.0 - 52.0 %    MCV 94.4 81.4 - 97.8 fL    MCH 31.0 27.0 - 33.0 pg    MCHC 32.9 (L) 33.7 - 35.3 g/dL    RDW 49.3 35.9 - 50.0 fL    Platelet Count 224 164 - 446 K/uL    MPV 11.2 9.0 - 12.9 fL    Neutrophils-Polys 88.30 (H) 44.00 - 72.00 %    Lymphocytes 4.80 (L) 22.00 - 41.00 %    Monocytes 6.30 0.00 - 13.40 %    Eosinophils 0.00 0.00 - 6.90 %    Basophils 0.20 0.00 - 1.80 %    Immature Granulocytes 0.40 0.00 - 0.90 %    Nucleated RBC 0.00 /100 WBC    Neutrophils (Absolute) 9.70 (H) 1.82 - 7.42 K/uL    Lymphs (Absolute) 0.53 (L) 1.00 - 4.80 K/uL    Monos (Absolute) 0.69 0.00 - 0.85 K/uL    Eos (Absolute) 0.00 0.00 - 0.51 K/uL    Baso (Absolute) 0.02 0.00 - 0.12 K/uL    Immature Granulocytes (abs) 0.04 0.00 - 0.11 K/uL    NRBC (Absolute) 0.00 K/uL   BASIC METABOLIC PANEL    Collection Time: 05/01/18  3:39 AM   Result Value Ref Range    Sodium 135 135 - 145 mmol/L    Potassium 4.4 3.6 - 5.5 mmol/L    Chloride 107 96 - 112 mmol/L    Co2 17 (L) 20 - 33 mmol/L    Glucose 135 (H) 65 - 99 mg/dL    Bun 22 8 - 22 mg/dL    Creatinine 1.07 0.50 - 1.40 mg/dL    Calcium 8.4 (L) 8.5 - 10.5 mg/dL    Anion Gap 11.0 0.0 - 11.9   LIPID PROFILE    Collection Time: 05/01/18  3:39 AM   Result Value Ref Range    Cholesterol,Tot 107 100 - 199 mg/dL    Triglycerides 129 0 - 149 mg/dL    HDL 33 (A) >=40 mg/dL    LDL 48 <100 mg/dL   MAGNESIUM    Collection Time: 05/01/18  3:39 AM   Result Value Ref Range    Magnesium 2.0 1.5 - 2.5 mg/dL   ESTIMATED GFR    Collection Time: 05/01/18  3:39 AM   Result Value Ref Range    GFR If African American >60 >60 mL/min/1.73 m 2    GFR If Non African American >60 >60 mL/min/1.73 m 2   EKG STAT    Collection Time: 05/01/18  3:42 AM   Result Value Ref Range    Report       [facility]    Test Date:  2018-05-01  Pt Name:    YUKI LOERA               Department: 161  MRN:        [id number]                       Room:       [facility]  Gender:     Male                         Technician: RON  :        1945                   Requested By:WILMER HILARIO TRISTAN  Order #:    [id number]                    Reading MD: Stanley Amaya MD    Measurements  Intervals                                Axis  Rate:       115                          P:          48  CT:         132                          QRS:        37  QRSD:       96                           T:          -20  QT:         373  QTc:        516    Interpretive Statements  SINUS TACHYCARDIA  BORDERLINE LOW VOLTAGE, EXTREMITY LEADS  REPOL ABNRM, SEVERE GLOBAL ISCHEMIA (LM/MVD)  PROLONGED QT INTERVAL  Compared to ECG 2018 00:45:37  Prolonged QT interval now present  Possible ischemia still present    Electronically Signed On 2018 7:29:54 PDT by Stanley Amaya MD         Cardiac Imaging and Procedures Review:    EKG and telemetry tracings personally reviewed    @[facility](car9:1])@    Impression and Medical Decision Making:  Principal Problem:    NSTEMI (non-ST elevated myocardial infarction) (HCC) POA: Yes  Active Problems:    Chest pain POA: Yes    Seizure disorder (HCC) POA: Yes    Parkinson's disease (HCC) POA: Yes    Memory impairment POA: Yes    Hypothyroidism POA: Yes    Chronic pain syndrome POA: Yes    Urge incontinence POA: Yes  Resolved Problems:    * No resolved hospital problems. *    CAD with non-STEMI  Status post PCI to the circumflex  Dual antiplatelet therapy  Statin  Metoprolol  EF reduced  Non-smoker  Cardiac rehab unlikely given his living remotely    CHF with reduced ejection fraction newly diagnosed compensated on exam  Metoprolol  ACE  Spironolactone    Question severe AS  May have low gradient due to reduced ejection fraction was moderate on prior echo  Outpatient workup consider dobutamine stress echo to evaluate if qualifies for TAVR      Future Appointments  Date Time Provider Department Center   2018 1:40 PM Karmen Hazel,  [facility]. [facility] None       It is my pleasure to participate in the care of Mr. Pham.  Please do not hesitate to contact me with questions or concerns.    Perry aPtel MD PhD [facility]  Cardiologist [facility]    5/1/2018

## 2018-05-02 NOTE — PROGRESS NOTES
"Pt continues to work himself up into a panic, pt states \"he cant breathe\" working on deep breathing exercises to calm him. Oxygen sats maintained throughout panic episodes   "

## 2018-05-02 NOTE — PROGRESS NOTES
Patient arrived on floor via wheelchair with CIC RN Alisia. Patient placed on monitor. Monitor room notified. Per [place] CCT patient is  on the monitor at this time. Patient declines any needs at this time. Plan of care discussed with the patient. Bed locked and in the lowest position with call light within reach.     This RN agrees with the assessment noted on this patient.

## 2018-05-02 NOTE — DISCHARGE PLANNING
Anticipated Discharge Disposition: Unknown at this time.     Action: LSW met with pt at bedside to discuss discharge plans. Pt was not oriented x 4 and was unable to LSW any information other than he lives alone in [place].     Barriers to Discharge: Pt lives alone with, what seems like, no community support.     Plan: Call family to discuss pt's home life and to come up with a safe discharge plan for pt.

## 2018-05-02 NOTE — CARE PLAN
Problem: Bowel/Gastric:  Goal: Normal bowel function is maintained or improved  Outcome: PROGRESSING AS EXPECTED  BM x 1 this shift    Problem: Respiratory:  Goal: Respiratory status will improve  Outcome: PROGRESSING SLOWER THAN EXPECTED  Pt c/o sob and difficulty breathing.  PT O2 sats remain above 95% on 4L/NC.  Educated pt on non pharmacological ways to reduce work of breathing and decrease anxiety

## 2018-05-02 NOTE — PROGRESS NOTES
Cardiology Progress Note               Author: Jana Sarabia, APRN Date & Time created: 5/2/2018  2:16 PM     Cardiology consultation for positive troponin and CP.    Admitted with chest pain. Pt started having CP the day before. The pt took his ntg with no relief. Pt was seen in [place] as he fell that am. Troponin was found to be 3.51. Due to ongoing CP pt was taken for cardiac catheterization. Stent was placed to ostial left circ.     History of HTN, Parkinson's, testicular cancer, seizure from head injury    5/2/18: pt sitting in bed, hyperventilating. He is easily reoriented. Has severe anxiety talking about [place]. Breath sounds are clear.     Telemetry: SR-ST     I/O 24 hour: -400    CONCLUSIONS 5/1/18  Limited echo to evaluate EF and aortic valve.  Severely reduced left ventricular systolic function.  Left ventricular ejection fraction is visually estimated to be 20%.  Global hypokinesis.  Severe aortic stenosis.  Aortic insuffiency not fully assessed but appears mild to moderate.     CORONARY ANGIOGRAPHY 5/1/18  IMPRESSION:  1.  Coronary artery disease with patent stent of the ostial left main into the   proximal left anterior descending artery with ostial left main 40% eccentric   in-stent restenosis and high-grade ostial left circumflex in-stent restenosis   at the left anterior descending bifurcation of a left dominant system.  2.  Successful percutaneous transluminal coronary angioplasty/stent placement   of the ostial left circumflex artery with 2.5x8 mm Synergy drug-eluting stent.  3.  Percutaneous transluminal coronary angioplasty of the distal left main and   proximal left anterior descending artery.  4.  Reduced left ventricular systolic function with ejection fraction of 20%.  5.  Elevated left ventricular end-diastolic pressure.  6.  Elevated right heart pressure with PA systolic pressure of 80 mmHg.  7.  Probable low flow, low gradient, severe aortic stenosis with peak-to-peak    gradient of 30 mmHg, mean gradient of 26 mmHg, calculated CRUZITO of 0.60 cm2.  8.  Successful Angio-Seal closure.    Review of Systems   Unable to perform ROS: Medical condition     Physical Exam   Constitutional: He appears well-developed and well-nourished.   HENT:   Head: Normocephalic and atraumatic.   Eyes: EOM are normal.   Cardiovascular: Normal rate, regular rhythm, intact distal pulses and normal pulses.    Murmur heard.   Systolic murmur is present with a grade of 3/6   Pulses:       Radial pulses are 2+ on the right side, and 2+ on the left side.   Pulmonary/Chest: Breath sounds normal.   Pt anxious, hyperventilating. Has to be reminded to slow his breathing. Re-oriented easily.   Abdominal: Soft. Bowel sounds are normal.   Musculoskeletal: He exhibits edema.   2+ lower extremity edema.   Neurological: He is alert.   Skin: Skin is warm and dry.   Psychiatric:   Very anxious.   Nursing note and vitals reviewed.    Hemodynamics:  Temp (24hrs), Av.4 °C (97.6 °F), Min:36.2 °C (97.2 °F), Max:36.6 °C (97.9 °F)  Temperature: 36.5 °C (97.7 °F)  Pulse  Av.3  Min: 72  Max: 122Heart Rate (Monitored): (!) 101  Blood Pressure : (!) 93/72 (RN notifed), NIBP: (!) 97/65     Respiratory:    Respiration: 20, Pulse Oximetry: 100 %  Fluids:   GI/Nutrition:  Orders Placed This Encounter   Procedures   • Diet Order     Standing Status:   Standing     Number of Occurrences:   1     Order Specific Question:   Diet:     Answer:   Cardiac [6]     Lab Results:  Recent Labs      18   0339  18   1225   WBC  11.0*  12.3*   RBC  3.74*  3.87*   HEMOGLOBIN  11.6*  11.9*   HEMATOCRIT  35.3*  35.5*   MCV  94.4  91.7   MCH  31.0  30.7   MCHC  32.9*  33.5*   RDW  49.3  48.5   PLATELETCT  224  231   MPV  11.2  11.7     Recent Labs      18   0339  18   1225   SODIUM  135  139   POTASSIUM  4.4  4.7   CHLORIDE  107  109   CO2  17*  19*   GLUCOSE  135*  153*   BUN  22  39*   CREATININE  1.07  1.26   CALCIUM  8.4*   9.1     Recent Labs      04/30/18   1557   APTT  27.6   INR  1.14*     Recent Labs      04/30/18   1557  04/30/18 2015   TROPONINI  5.20*  6.52*     Recent Labs      05/01/18   0339   TRIGLYCERIDE  129   HDL  33*   LDL  48     Medical Decision Making, by Problem:  Active Hospital Problems    Diagnosis   • *NSTEMI (non-ST elevated myocardial infarction) ([facility]) [I21.4]   • Chest pain [R07.9]   • Seizure disorder ([facility]) [G40.909]   • Parkinson's disease ([facility]) [G20]   • Memory impairment [R41.3]   • Hypothyroidism [E03.9]   • Chronic pain syndrome [G89.4]   • Urge incontinence [N39.41]     Plan:  1. CAD with NSTEMI  -cont asa, statin, brilinta, BB, ACE  -Hold for SBP<90    2. CHF with reduced EF newly diagnosed, compensated on exam  -EF 20%  -cont spironolactone, furosemide, ACE  -try to maximize CHF meds, change BP parameters.    3. Severe aortic stenosis  - will need evaluation as outpatient with poss dobutamine stress echo  -possible TAVR    Thank you for allowing us to participate in the care of your patient.     Quality-Core Measures   Reviewed items::  EKG reviewed, Labs reviewed, Medications reviewed and Radiology images reviewed  Thomas catheter::  No Thomas

## 2018-05-02 NOTE — DIETARY
Nutrition Services: consult for heart healthy diet education  Visited pt for heart healthy diet education. However after introducing myself and reason for visit pt began to become somewhat upset and taking quick breaths. Encouraged pt we did not need to go over diet education at this time and stayed with pt until he was calmed and breathing at his baseline upon entering room. Left diet education handout at bedside. Did not provide further education as pt not appropriate per RD judgement.     Please re-consult if pt becomes appropriate/able to participate with diet education. RD will monitor per dept policy.

## 2018-05-02 NOTE — PROGRESS NOTES
Pt stating he can't calm down. Deep breathing taught and pt return demonstrated. Pt having a hard time remaining calm even through the breathing exercise. Taught to visualize a safe space. Pt stated his favorite place is his home and his red leather chair. This calmed him down for 5 minutes. Safety precautions in place, call light within reach. Will round back in 10 minutes.

## 2018-05-02 NOTE — HEART FAILURE PROGRAM
Cardiovascular Nurse Navigator ([phone]) Note:    Reviewed ACS medications:  DAPT: aspirin + ticagrelor  Beta-Blocker:  metoprolol  Statin:  atorvastatin    Consider for aldosterone blockade?  is on sprironolactone  Consider for ACE-I?  Is on lisinopril  Intensive Cardiac Rehab (ICR) Referral:  Referred on 5/1; has current inpatient orders for nutrition consult & PT for Phase I ICR    Demographics  Patient resides in: [place]  Insurance: Medicare A&B    Inpatient & Discharge Patient Education:  Bedside nursing to continually provide patient education on ACS meds, signs and symptoms to monitor for, and risk factor modification.     Also at discharge please complete the “Acute Coronary Syndrome” special instructions on the AVS.          Orders Placed:    Social Work     To assess for social barriers to management of disease process    Cardiology Follow Up Care    May 17, 2018  1:40 PM [facility]  [facility]OSPITAL FO[facility] (--) [address], [address]  [place] [id number] [phone]     Thank you and please call with questions.

## 2018-05-02 NOTE — PROGRESS NOTES
2 RN Skin Assessment with Kelly VIRGEN    Bilat heels red/blanching  Bilat feet dry/calloused  Sacrum red/blanching  Bilat groin sites - hematoma under left groin site, small area of hardness noted at center; right site has some old blood in tegaderm but soft     All other skin intact

## 2018-05-02 NOTE — PROGRESS NOTES
Monitor Summary  SR/ST with rare PVC, rare couplet and 4 & 7 beats of BBB beats  Rate   .12/.08/.40

## 2018-05-03 ENCOUNTER — PATIENT OUTREACH (OUTPATIENT)
Dept: HEALTH INFORMATION MANAGEMENT | Facility: OTHER | Age: 73
End: 2018-05-03

## 2018-05-03 PROBLEM — J96.01 ACUTE HYPOXEMIC RESPIRATORY FAILURE (HCC): Status: ACTIVE | Noted: 2018-05-03

## 2018-05-03 LAB
ALBUMIN SERPL BCP-MCNC: 3.6 G/DL (ref 3.2–4.9)
ALBUMIN/GLOB SERPL: 1.2 G/DL
ALP SERPL-CCNC: 75 U/L (ref 30–99)
ALT SERPL-CCNC: 6 U/L (ref 2–50)
ANION GAP SERPL CALC-SCNC: 11 MMOL/L (ref 0–11.9)
APPEARANCE UR: ABNORMAL
AST SERPL-CCNC: 82 U/L (ref 12–45)
BACTERIA #/AREA URNS HPF: NEGATIVE /HPF
BASOPHILS # BLD AUTO: 0.1 % (ref 0–1.8)
BASOPHILS # BLD: 0.01 K/UL (ref 0–0.12)
BILIRUB SERPL-MCNC: 0.4 MG/DL (ref 0.1–1.5)
BILIRUB UR QL STRIP.AUTO: NEGATIVE
BUN SERPL-MCNC: 44 MG/DL (ref 8–22)
CALCIUM SERPL-MCNC: 8.8 MG/DL (ref 8.5–10.5)
CHLORIDE SERPL-SCNC: 110 MMOL/L (ref 96–112)
CO2 SERPL-SCNC: 18 MMOL/L (ref 20–33)
COLOR UR: YELLOW
CREAT SERPL-MCNC: 1.15 MG/DL (ref 0.5–1.4)
CRP SERPL HS-MCNC: 13.15 MG/DL (ref 0–0.75)
EKG IMPRESSION: NORMAL
EOSINOPHIL # BLD AUTO: 0.11 K/UL (ref 0–0.51)
EOSINOPHIL NFR BLD: 1.5 % (ref 0–6.9)
EPI CELLS #/AREA URNS HPF: ABNORMAL /HPF
ERYTHROCYTE [DISTWIDTH] IN BLOOD BY AUTOMATED COUNT: 51.8 FL (ref 35.9–50)
ERYTHROCYTE [SEDIMENTATION RATE] IN BLOOD BY WESTERGREN METHOD: 68 MM/HOUR (ref 0–20)
FERRITIN SERPL-MCNC: 59.6 NG/ML (ref 22–322)
FOLATE SERPL-MCNC: >24 NG/ML
GLOBULIN SER CALC-MCNC: 3 G/DL (ref 1.9–3.5)
GLUCOSE SERPL-MCNC: 125 MG/DL (ref 65–99)
GLUCOSE UR STRIP.AUTO-MCNC: NEGATIVE MG/DL
HCT VFR BLD AUTO: 33.8 % (ref 42–52)
HGB BLD-MCNC: 10.6 G/DL (ref 14–18)
HGB RETIC QN AUTO: 33.1 PG/CELL (ref 29–35)
HYALINE CASTS #/AREA URNS LPF: ABNORMAL /LPF
IMM GRANULOCYTES # BLD AUTO: 0.03 K/UL (ref 0–0.11)
IMM GRANULOCYTES NFR BLD AUTO: 0.4 % (ref 0–0.9)
IMM RETICS NFR: 20.7 % (ref 9.3–17.4)
INR PPP: 1.23 (ref 0.87–1.13)
IRON SATN MFR SERPL: 5 % (ref 15–55)
IRON SERPL-MCNC: 21 UG/DL (ref 50–180)
KETONES UR STRIP.AUTO-MCNC: ABNORMAL MG/DL
LEUKOCYTE ESTERASE UR QL STRIP.AUTO: NEGATIVE
LYMPHOCYTES # BLD AUTO: 1.2 K/UL (ref 1–4.8)
LYMPHOCYTES NFR BLD: 16.6 % (ref 22–41)
MAGNESIUM SERPL-MCNC: 2.4 MG/DL (ref 1.5–2.5)
MCH RBC QN AUTO: 30.5 PG (ref 27–33)
MCHC RBC AUTO-ENTMCNC: 31.4 G/DL (ref 33.7–35.3)
MCV RBC AUTO: 97.1 FL (ref 81.4–97.8)
MICRO URNS: ABNORMAL
MONOCYTES # BLD AUTO: 0.54 K/UL (ref 0–0.85)
MONOCYTES NFR BLD AUTO: 7.5 % (ref 0–13.4)
NEUTROPHILS # BLD AUTO: 5.32 K/UL (ref 1.82–7.42)
NEUTROPHILS NFR BLD: 73.9 % (ref 44–72)
NITRITE UR QL STRIP.AUTO: NEGATIVE
NRBC # BLD AUTO: 0 K/UL
NRBC BLD-RTO: 0 /100 WBC
PH UR STRIP.AUTO: 5 [PH]
PHOSPHATE SERPL-MCNC: 3.9 MG/DL (ref 2.5–4.5)
PLATELET # BLD AUTO: 225 K/UL (ref 164–446)
PMV BLD AUTO: 11.3 FL (ref 9–12.9)
POTASSIUM SERPL-SCNC: 3.9 MMOL/L (ref 3.6–5.5)
PROCALCITONIN SERPL-MCNC: 0.19 NG/ML
PROT SERPL-MCNC: 6.6 G/DL (ref 6–8.2)
PROT UR QL STRIP: NEGATIVE MG/DL
PROTHROMBIN TIME: 15.2 SEC (ref 12–14.6)
RBC # BLD AUTO: 3.48 M/UL (ref 4.7–6.1)
RBC # URNS HPF: ABNORMAL /HPF
RBC UR QL AUTO: NEGATIVE
RETICS # AUTO: 0.05 M/UL (ref 0.04–0.06)
RETICS/RBC NFR: 1.3 % (ref 0.8–2.1)
SODIUM SERPL-SCNC: 139 MMOL/L (ref 135–145)
SP GR UR STRIP.AUTO: 1.03
TIBC SERPL-MCNC: 389 UG/DL (ref 250–450)
TREPONEMA PALLIDUM IGG+IGM AB [PRESENCE] IN SERUM OR PLASMA BY IMMUNOASSAY: NON REACTIVE
UROBILINOGEN UR STRIP.AUTO-MCNC: 1 MG/DL
VIT B12 SERPL-MCNC: 899 PG/ML (ref 211–911)
WBC # BLD AUTO: 7.2 K/UL (ref 4.8–10.8)
WBC #/AREA URNS HPF: ABNORMAL /HPF

## 2018-05-03 PROCEDURE — 81001 URINALYSIS AUTO W/SCOPE: CPT

## 2018-05-03 PROCEDURE — 700102 HCHG RX REV CODE 250 W/ 637 OVERRIDE(OP): Performed by: NURSE PRACTITIONER

## 2018-05-03 PROCEDURE — A9270 NON-COVERED ITEM OR SERVICE: HCPCS | Performed by: NURSE PRACTITIONER

## 2018-05-03 PROCEDURE — 700111 HCHG RX REV CODE 636 W/ 250 OVERRIDE (IP): Performed by: INTERNAL MEDICINE

## 2018-05-03 PROCEDURE — G9168 MEMORY CURRENT STATUS: HCPCS | Mod: CJ

## 2018-05-03 PROCEDURE — 84145 PROCALCITONIN (PCT): CPT

## 2018-05-03 PROCEDURE — 93010 ELECTROCARDIOGRAM REPORT: CPT | Performed by: INTERNAL MEDICINE

## 2018-05-03 PROCEDURE — 94760 N-INVAS EAR/PLS OXIMETRY 1: CPT

## 2018-05-03 PROCEDURE — 85610 PROTHROMBIN TIME: CPT

## 2018-05-03 PROCEDURE — 82607 VITAMIN B-12: CPT

## 2018-05-03 PROCEDURE — 700112 HCHG RX REV CODE 229: Performed by: INTERNAL MEDICINE

## 2018-05-03 PROCEDURE — 84100 ASSAY OF PHOSPHORUS: CPT

## 2018-05-03 PROCEDURE — 80053 COMPREHEN METABOLIC PANEL: CPT

## 2018-05-03 PROCEDURE — A9270 NON-COVERED ITEM OR SERVICE: HCPCS | Performed by: INTERNAL MEDICINE

## 2018-05-03 PROCEDURE — 700102 HCHG RX REV CODE 250 W/ 637 OVERRIDE(OP): Performed by: INTERNAL MEDICINE

## 2018-05-03 PROCEDURE — 85046 RETICYTE/HGB CONCENTRATE: CPT

## 2018-05-03 PROCEDURE — 92523 SPEECH SOUND LANG COMPREHEN: CPT

## 2018-05-03 PROCEDURE — 770020 HCHG ROOM/CARE - TELE (206)

## 2018-05-03 PROCEDURE — 95951 EEG: CPT | Mod: 52

## 2018-05-03 PROCEDURE — 83550 IRON BINDING TEST: CPT

## 2018-05-03 PROCEDURE — 94640 AIRWAY INHALATION TREATMENT: CPT

## 2018-05-03 PROCEDURE — 70450 CT HEAD/BRAIN W/O DYE: CPT

## 2018-05-03 PROCEDURE — 86140 C-REACTIVE PROTEIN: CPT

## 2018-05-03 PROCEDURE — 85652 RBC SED RATE AUTOMATED: CPT

## 2018-05-03 PROCEDURE — G9169 MEMORY GOAL STATUS: HCPCS | Mod: CJ

## 2018-05-03 PROCEDURE — 99233 SBSQ HOSP IP/OBS HIGH 50: CPT | Performed by: INTERNAL MEDICINE

## 2018-05-03 PROCEDURE — 700101 HCHG RX REV CODE 250: Performed by: INTERNAL MEDICINE

## 2018-05-03 PROCEDURE — 93005 ELECTROCARDIOGRAM TRACING: CPT | Performed by: INTERNAL MEDICINE

## 2018-05-03 PROCEDURE — 82728 ASSAY OF FERRITIN: CPT

## 2018-05-03 PROCEDURE — 83735 ASSAY OF MAGNESIUM: CPT

## 2018-05-03 PROCEDURE — G9170 MEMORY D/C STATUS: HCPCS | Mod: CJ

## 2018-05-03 PROCEDURE — 87086 URINE CULTURE/COLONY COUNT: CPT

## 2018-05-03 PROCEDURE — 86780 TREPONEMA PALLIDUM: CPT

## 2018-05-03 PROCEDURE — 82746 ASSAY OF FOLIC ACID SERUM: CPT

## 2018-05-03 PROCEDURE — 4A10X4Z MONITORING OF CENTRAL NERVOUS ELECTRICAL ACTIVITY, EXTERNAL APPROACH: ICD-10-PCS | Performed by: PSYCHIATRY & NEUROLOGY

## 2018-05-03 PROCEDURE — 85025 COMPLETE CBC W/AUTO DIFF WBC: CPT

## 2018-05-03 PROCEDURE — 36415 COLL VENOUS BLD VENIPUNCTURE: CPT

## 2018-05-03 PROCEDURE — 83540 ASSAY OF IRON: CPT

## 2018-05-03 RX ORDER — AMOXICILLIN AND CLAVULANATE POTASSIUM 875; 125 MG/1; MG/1
1 TABLET, FILM COATED ORAL EVERY 12 HOURS
Qty: 9 TAB | Refills: 0 | Status: SHIPPED | OUTPATIENT
Start: 2018-05-04 | End: 2018-05-14

## 2018-05-03 RX ORDER — POTASSIUM CHLORIDE 20 MEQ/1
20 TABLET, EXTENDED RELEASE ORAL DAILY
Status: DISCONTINUED | OUTPATIENT
Start: 2018-05-04 | End: 2018-05-16 | Stop reason: HOSPADM

## 2018-05-03 RX ORDER — DOXYCYCLINE 100 MG/1
100 TABLET ORAL EVERY 12 HOURS
Qty: 9 TAB | Refills: 0 | Status: SHIPPED | OUTPATIENT
Start: 2018-05-03 | End: 2018-05-14

## 2018-05-03 RX ORDER — FERROUS GLUCONATE 324(38)MG
324 TABLET ORAL 2 TIMES DAILY WITH MEALS
Qty: 60 TAB | Refills: 0 | Status: SHIPPED | OUTPATIENT
Start: 2018-05-03

## 2018-05-03 RX ORDER — ASCORBIC ACID 500 MG
500 TABLET ORAL 3 TIMES DAILY
Status: DISCONTINUED | OUTPATIENT
Start: 2018-05-03 | End: 2018-05-07

## 2018-05-03 RX ORDER — ATORVASTATIN CALCIUM 40 MG/1
40 TABLET, FILM COATED ORAL EVERY EVENING
Qty: 30 TAB | Refills: 0 | Status: SHIPPED | OUTPATIENT
Start: 2018-05-03

## 2018-05-03 RX ORDER — FUROSEMIDE 10 MG/ML
20 INJECTION INTRAMUSCULAR; INTRAVENOUS ONCE
Status: COMPLETED | OUTPATIENT
Start: 2018-05-03 | End: 2018-05-03

## 2018-05-03 RX ORDER — POTASSIUM CHLORIDE 20 MEQ/1
40 TABLET, EXTENDED RELEASE ORAL EVERY 4 HOURS
Status: COMPLETED | OUTPATIENT
Start: 2018-05-03 | End: 2018-05-03

## 2018-05-03 RX ORDER — DOXYCYCLINE 100 MG/1
100 TABLET ORAL EVERY 12 HOURS
Status: COMPLETED | OUTPATIENT
Start: 2018-05-03 | End: 2018-05-08

## 2018-05-03 RX ORDER — ECHINACEA PURPUREA EXTRACT 125 MG
2 TABLET ORAL
Status: DISCONTINUED | OUTPATIENT
Start: 2018-05-03 | End: 2018-05-06

## 2018-05-03 RX ORDER — ASCORBIC ACID 500 MG
500 TABLET ORAL 3 TIMES DAILY
Qty: 90 TAB | Refills: 0 | Status: SHIPPED | OUTPATIENT
Start: 2018-05-03

## 2018-05-03 RX ORDER — SPIRONOLACTONE 25 MG/1
25 TABLET ORAL DAILY
Qty: 30 TAB | Refills: 0 | Status: SHIPPED | OUTPATIENT
Start: 2018-05-04 | End: 2018-05-05

## 2018-05-03 RX ORDER — NITROGLYCERIN 0.4 MG/1
0.4 TABLET SUBLINGUAL PRN
Qty: 25 TAB | Refills: 0 | Status: SHIPPED | OUTPATIENT
Start: 2018-05-03 | End: 2018-05-14

## 2018-05-03 RX ORDER — FUROSEMIDE 20 MG/1
20 TABLET ORAL DAILY
Qty: 30 TAB | Refills: 0 | Status: SHIPPED | OUTPATIENT
Start: 2018-05-04 | End: 2018-05-05

## 2018-05-03 RX ORDER — LISINOPRIL 5 MG/1
2.5 TABLET ORAL
Status: DISCONTINUED | OUTPATIENT
Start: 2018-05-04 | End: 2018-05-10

## 2018-05-03 RX ORDER — AMOXICILLIN AND CLAVULANATE POTASSIUM 875; 125 MG/1; MG/1
1 TABLET, FILM COATED ORAL EVERY 12 HOURS
Status: COMPLETED | OUTPATIENT
Start: 2018-05-04 | End: 2018-05-08

## 2018-05-03 RX ORDER — FERROUS GLUCONATE 324(38)MG
324 TABLET ORAL 2 TIMES DAILY WITH MEALS
Status: DISCONTINUED | OUTPATIENT
Start: 2018-05-03 | End: 2018-05-07

## 2018-05-03 RX ORDER — FUROSEMIDE 10 MG/ML
10 INJECTION INTRAMUSCULAR; INTRAVENOUS ONCE
Status: COMPLETED | OUTPATIENT
Start: 2018-05-03 | End: 2018-05-03

## 2018-05-03 RX ORDER — LISINOPRIL 2.5 MG/1
2.5 TABLET ORAL DAILY
Qty: 30 TAB | Refills: 0 | Status: SHIPPED | OUTPATIENT
Start: 2018-05-04 | End: 2018-05-14

## 2018-05-03 RX ADMIN — ASPIRIN 81 MG: 81 TABLET, COATED ORAL at 06:07

## 2018-05-03 RX ADMIN — IPRATROPIUM BROMIDE AND ALBUTEROL SULFATE 3 ML: .5; 3 SOLUTION RESPIRATORY (INHALATION) at 15:49

## 2018-05-03 RX ADMIN — GABAPENTIN 300 MG: 300 CAPSULE ORAL at 17:41

## 2018-05-03 RX ADMIN — GABAPENTIN 300 MG: 300 CAPSULE ORAL at 14:40

## 2018-05-03 RX ADMIN — CARBIDOPA AND LEVODOPA 1.5 TABLET: 25; 100 TABLET ORAL at 12:29

## 2018-05-03 RX ADMIN — DOXAZOSIN 2 MG: 2 TABLET ORAL at 06:15

## 2018-05-03 RX ADMIN — METOPROLOL TARTRATE 25 MG: 25 TABLET, FILM COATED ORAL at 17:40

## 2018-05-03 RX ADMIN — IPRATROPIUM BROMIDE AND ALBUTEROL SULFATE 3 ML: .5; 3 SOLUTION RESPIRATORY (INHALATION) at 12:00

## 2018-05-03 RX ADMIN — SPIRONOLACTONE 25 MG: 25 TABLET, FILM COATED ORAL at 06:05

## 2018-05-03 RX ADMIN — DOCUSATE CALCIUM 240 MG: 240 CAPSULE, LIQUID FILLED ORAL at 17:53

## 2018-05-03 RX ADMIN — DOXYCYCLINE 100 MG: 100 TABLET ORAL at 18:55

## 2018-05-03 RX ADMIN — SALINE NASAL SPRAY 2 SPRAY: 1.5 SOLUTION NASAL at 12:30

## 2018-05-03 RX ADMIN — LEVETIRACETAM 1500 MG: 500 TABLET, FILM COATED ORAL at 06:07

## 2018-05-03 RX ADMIN — POTASSIUM CHLORIDE 40 MEQ: 1500 TABLET, EXTENDED RELEASE ORAL at 22:21

## 2018-05-03 RX ADMIN — FUROSEMIDE 20 MG: 10 INJECTION, SOLUTION INTRAMUSCULAR; INTRAVENOUS at 17:41

## 2018-05-03 RX ADMIN — GABAPENTIN 300 MG: 300 CAPSULE ORAL at 06:05

## 2018-05-03 RX ADMIN — SELEGILINE HYDROCHLORIDE 5 MG: 5 TABLET ORAL at 06:13

## 2018-05-03 RX ADMIN — CARBIDOPA AND LEVODOPA 1.5 TABLET: 25; 100 TABLET ORAL at 17:38

## 2018-05-03 RX ADMIN — Medication 500 MCG: at 06:06

## 2018-05-03 RX ADMIN — SELEGILINE HYDROCHLORIDE 5 MG: 5 TABLET ORAL at 17:45

## 2018-05-03 RX ADMIN — OXYCODONE HYDROCHLORIDE AND ACETAMINOPHEN 500 MG: 500 TABLET ORAL at 18:55

## 2018-05-03 RX ADMIN — FUROSEMIDE 20 MG: 20 TABLET ORAL at 06:00

## 2018-05-03 RX ADMIN — FUROSEMIDE 10 MG: 10 INJECTION, SOLUTION INTRAMUSCULAR; INTRAVENOUS at 18:55

## 2018-05-03 RX ADMIN — QUETIAPINE FUMARATE 100 MG: 25 TABLET ORAL at 17:40

## 2018-05-03 RX ADMIN — IPRATROPIUM BROMIDE AND ALBUTEROL SULFATE 3 ML: .5; 3 SOLUTION RESPIRATORY (INHALATION) at 18:06

## 2018-05-03 RX ADMIN — METOPROLOL TARTRATE 50 MG: 50 TABLET, FILM COATED ORAL at 06:19

## 2018-05-03 RX ADMIN — IPRATROPIUM BROMIDE AND ALBUTEROL SULFATE 3 ML: .5; 3 SOLUTION RESPIRATORY (INHALATION) at 07:26

## 2018-05-03 RX ADMIN — THERA TABS 1 TABLET: TAB at 18:54

## 2018-05-03 RX ADMIN — LEVETIRACETAM 1500 MG: 500 TABLET, FILM COATED ORAL at 17:39

## 2018-05-03 RX ADMIN — TICAGRELOR 90 MG: 90 TABLET ORAL at 06:06

## 2018-05-03 RX ADMIN — CITALOPRAM HYDROBROMIDE 20 MG: 20 TABLET ORAL at 06:19

## 2018-05-03 RX ADMIN — LISINOPRIL 5 MG: 5 TABLET ORAL at 06:04

## 2018-05-03 RX ADMIN — FERROUS GLUCONATE 324 MG: 324 TABLET ORAL at 18:54

## 2018-05-03 RX ADMIN — POTASSIUM CHLORIDE 40 MEQ: 1500 TABLET, EXTENDED RELEASE ORAL at 18:54

## 2018-05-03 RX ADMIN — ATORVASTATIN CALCIUM 40 MG: 40 TABLET, FILM COATED ORAL at 17:38

## 2018-05-03 RX ADMIN — TICAGRELOR 90 MG: 90 TABLET ORAL at 17:44

## 2018-05-03 RX ADMIN — CEFTRIAXONE 2 G: 2 INJECTION, POWDER, FOR SOLUTION INTRAMUSCULAR; INTRAVENOUS at 19:24

## 2018-05-03 RX ADMIN — CARBIDOPA AND LEVODOPA 1.5 TABLET: 25; 100 TABLET ORAL at 06:14

## 2018-05-03 ASSESSMENT — ENCOUNTER SYMPTOMS
HEARTBURN: 0
DIZZINESS: 0
TINGLING: 0
NERVOUS/ANXIOUS: 1
DIZZINESS: 1
LOSS OF CONSCIOUSNESS: 0
WHEEZING: 0
SPEECH CHANGE: 0
SEIZURES: 0
ORTHOPNEA: 0
CONSTIPATION: 0
WEIGHT LOSS: 0
BLURRED VISION: 0
FLANK PAIN: 0
SENSORY CHANGE: 0
ABDOMINAL PAIN: 0
FEVER: 0
NAUSEA: 0
PALPITATIONS: 0
MYALGIAS: 0
NECK PAIN: 0
CHILLS: 0
HEADACHES: 0
SHORTNESS OF BREATH: 0
FALLS: 0
DEPRESSION: 0
VOMITING: 0
PND: 0
SHORTNESS OF BREATH: 1
FOCAL WEAKNESS: 0
COUGH: 0
WEAKNESS: 0
HEMOPTYSIS: 0
DOUBLE VISION: 0
BACK PAIN: 0
BLOOD IN STOOL: 0
DIARRHEA: 0
CLAUDICATION: 0
TREMORS: 1
SPUTUM PRODUCTION: 0
DIAPHORESIS: 0

## 2018-05-03 ASSESSMENT — PAIN SCALES - GENERAL
PAINLEVEL_OUTOF10: 0

## 2018-05-03 ASSESSMENT — PAIN SCALES - WONG BAKER
WONGBAKER_NUMERICALRESPONSE: DOESN'T HURT AT ALL

## 2018-05-03 NOTE — PROGRESS NOTES
Report provided to oncoming a.m. RN. No questions at this time. CT performed on shift, labs drawn this a.m. Early in shift, attempts were made to exit the bed. Bed alarm placed as well as chair alarm under bedding.

## 2018-05-03 NOTE — EEG PROGRESS NOTE
EEG 05/03/18 4:25 PM    ROUTINE ELECTROENCEPHALOGRAM REPORT      NAME: Prince Pham    REFERRING Dr:    INDICATION:  Seizure          TECHNIQUE: 30 channel routine electroencephalogram (EEG) was performed in accordance with the international 10-20 system. The study was reviewed in bipolar and referential montages. The recording examined the patient during wakeful and drowsy state(s).     DESCRIPTION OF THE RECORD:        Background rhythm during awake stage shows well-organized, well-developed, average voltage 10 to 11 hertz alpha activity in the posterior regions.  It blocks with eye opening and it is bilaterally synchronous and symmetrical.  There are some epileptiform spike and sharp-and-wave discharges and lateralizing delta abnormalities over the left are seen.  Photic stimulation did not produce any abnormalities. Stage I sleep was achieved.      ACTIVATION PROCEDURES:      Photic Stimulation were done    ICTAL AND/OR INTERICTAL FINDINGS:    here are some epileptiform spike and sharp-and-wave discharges and lateralizing delta abnormalities over the left are seen  No clinical events or seizures were reported or recorded during the study.      EKG: sampling of the EKG recording demonstrated sinus rhythm.        INTERPRETATION:      ________________________________________________________________________    This scalp EEG is consistent with      Probable Focal seizure disorder from the left ( because of interictal sharp and delta slow over left temporal)    The tremor of hands is not related with seizures though ( probably due to parkinson tremor?)    There are no active seizures in this record    Advise continue seizure medication and follow up in outpatient neurology clinic    EEG 05/03/18 4:38 PM    ________________________________________________________________________                      Shaking, tremor

## 2018-05-03 NOTE — PROGRESS NOTES
Received report, assumed pt care. Pt a&o 2, VSS, assessment completed. Resting comfortably in bed with call light, bedside table in reach. No c/o at this time. Side rails up 2. Instructed to use call light when needing to get OOB verbalized, RN will reinforce. Bed alarm on, bed in low position. Will continue to monitor.

## 2018-05-03 NOTE — DISCHARGE PLANNING
Anticipated Discharge Disposition: Home    Action: Pt's pharmacy is listed as [facility]. LSW called [facility] and stated to fax the prescription to pt's PCP, Ze Whitt at [phone]. LSW faxed prescription to provider. Fax did not go through. LSW faxed prescription again to provider. Fax did not go through.     Barriers to Discharge: Transportation    Plan: [facility]. Obtain transportation for pt to [place].

## 2018-05-03 NOTE — CARE PLAN
Problem: Knowledge Deficit  Goal: Knowledge of disease process/condition, treatment plan, diagnostic tests, and medications will improve  POC: EEG, IV Rocephin, PO Lasix, PT/OT/SLP following, palliative, lab work up    Problem: Discharge Barriers/Planning  Goal: Patient's continuum of care needs will be met  Encephalopathy to improve prior to DC, SNF vs home with HH

## 2018-05-03 NOTE — DISCHARGE PLANNING
Anticipated Discharge Disposition: SNF v HHC    Action: LSW received a phone call from Charity [phone]) with [facility] in [place], [place]. Charity stated that if pt needs SNF in future, [facility] will have a bed for him.     Barriers to Discharge: Pt's mental state.     Plan: Discuss d/c plan in IDT.

## 2018-05-03 NOTE — HEART FAILURE PROGRAM
Cardiovascular Nurse Navigator () Advanced Heart Failure Program Inpatient Progress Note:   Hospital Admit Date: 4/30/18  New Heart Failure Problems List Date: 5/2/18    Acutely decompensated HFrEF (15%). PO diuresis given this morning, Cardiology following. They are recommending dobutamine stress test in a month or two then consider TAVR qualification.    Hospitalist note yesterday indicating that pt is still encephalopathic.      [facility] Plan Notes:   Pertaining to Brilinta prescription  Therapy Notes:   Unsuccessful attempt at Cardiac Rehab Phase I due to confusion/anxiety  Insurance Coverage:  Medicare A&B among others  Patient Residence:  [place], [place]  Advanced Care Planning:  No AD on file. Full code status at the time of this note's filing.    The ACC recommends engaging palliative care as part of optimization of HFrEF treatment to solicit goals of care and focus on quality of life throughout the clinical course of HF.    Palliative to discuss Advanced Care Planning is ordered but will likely not be completed until patient's mentation improves or family are found.     Follow up appointment:   Unless discharged under the care of Hospice, pt must have an appointment scheduled within 7 days of discharge (Cardiology, PCP, or DC Clinic).    If discharged to Transitional Care Facility (LTAC, SNF, IRH), appointment should be made about a month out for after TCF.     This [facility] has placed an order for Hospital Schedulers to arrange f/u appointment within seven calendar days of anticipated discharge date from acute on 5/7. Get into [facility] eventually if not for seven day appt for ongoing HF management.    Bedside Nursing Education:  Please provide HF packet and repeated, ongoing education while administering medications, weighing patient, discussing management of symptoms, diet and need to follow up and act on changes.     Bedside Nursing Weights:  Please weigh patient daily (standing scale if not clinically  "contraindicated) and have him (as mentation allows) write weight on HF Calendar. RN/CNA to document weight in Epic.    Please assess pt's understanding of what to do if weight gain occurs. Reinforce education to act (call telephone number on their HF Book) if they gain 3 or more pounds in a 24 hr period or 5 or more pounds in a week.    If pt does not own a working scale and cannot afford to purchase one, please provide one. Scales can be found in the Care Coordination Rooms on T-7&T-8.    Bedside Nursing Discharge:  When completing the after visit summary (discharge instructions) please select \"Cardiac Diagnosis, and Heart Failure\" in the special instructions section to populate the heart failure specific discharge instructions.     Referrals Placed:    Social Work   Please assess transportation and copay resources for: acquisition of medications and attendance at follow up appointments.  Registered Dietician  To provide education on HF diet.    Thank you and please call YOLI Anderson with any questions: [phone].   "

## 2018-05-03 NOTE — PROGRESS NOTES
Telemetry: Sinus Rhythm, low 70, high 94.  PVC and PAC - couplets. .14/.08/.36 flow sheet added to chart.

## 2018-05-03 NOTE — ASSESSMENT & PLAN NOTE
Resolved  Underlying pneumonia cannot be ruled out  UA is negative  Encephalopathy improved  Given advanced age, belief empiric abx therapy in the setting of elevated ESR, CRP, Procalcitonin and encephalopathy is reasonable   De escalated to PO Augmentin completed 5/8

## 2018-05-03 NOTE — PROGRESS NOTES
[facility] Hospitalist Progress Note    Date of Service: 2018    Chief Complaint  72 y.o. male admitted 2018 with chest pain / NSTEMI s/p PCI     Interval Problem Update  Patient seen and evaluated on rounds  Significantly confused and delirious  Initially unaware where he was  Panicking, anxious, wheezing  Unable to participate in Interval history  Improvement with seroquel 50 mg  Now aware in [place] but unable to answer other questions  No other concerns per nursing team    Consultants/Specialty  Cardiology    Disposition  Continue telemetry monitoring  SLP cog / PT / OT evaluation  HHC vs SNF based on therapy evaluation  Continue close clinical monitoring  Needs improvement of encephalopathy prior to discharge         Review of Systems   Unable to perform ROS: Mental acuity      Physical Exam  Laboratory/Imaging   Hemodynamics  Temp (24hrs), Av.6 °C (97.9 °F), Min:36.2 °C (97.2 °F), Max:37.6 °C (99.7 °F)   Temperature: 37.6 °C (99.7 °F)  Pulse  Av.5  Min: 72  Max: 122   Blood Pressure : 111/77      Respiratory      Respiration: 18, Pulse Oximetry: 99 %     Work Of Breathing / Effort: (P) Moderate;Shallow;Tachypnea  RUL Breath Sounds: (P) Clear, RML Breath Sounds: (P) Clear, RLL Breath Sounds: (P) Diminished, NOA Breath Sounds: (P) Clear, LLL Breath Sounds: (P) Diminished    Fluids    Intake/Output Summary (Last 24 hours) at 18  Last data filed at 18 1800   Gross per 24 hour   Intake              500 ml   Output              200 ml   Net              300 ml       Nutrition  Orders Placed This Encounter   Procedures   • Diet Order     Standing Status:   Standing     Number of Occurrences:   1     Order Specific Question:   Diet:     Answer:   Cardiac [6]     Physical Exam   Constitutional: He appears well-developed.  Non-toxic appearance. No distress.   HENT:   Head: Normocephalic and atraumatic.   Mouth/Throat: Oropharynx is clear and moist. No oropharyngeal exudate.   Eyes:  Conjunctivae are normal. Pupils are equal, round, and reactive to light. Right eye exhibits no discharge. Left eye exhibits no discharge. No scleral icterus.   Neck: Normal range of motion. No JVD present. No edema and no erythema present.   Cardiovascular: Normal rate and regular rhythm.  Exam reveals no gallop and no friction rub.    Murmur heard.  Pulmonary/Chest: Effort normal. No respiratory distress. He has wheezes. He has rales. He exhibits no tenderness.   Abdominal: Soft. Bowel sounds are normal. He exhibits no distension. There is no tenderness. There is no rebound.   Musculoskeletal: Normal range of motion. He exhibits edema (Minimal). He exhibits no tenderness or deformity.   Neurological: He is alert.   Confused. Anxious. Delirious. No focal neurological deficits.    Skin: Skin is warm and dry. No rash noted. He is not diaphoretic. No erythema.   Psychiatric: He is slowed. Cognition and memory are impaired.   Anxious   Nursing note and vitals reviewed.      Recent Labs      05/01/18   0339  05/02/18   1225   WBC  11.0*  12.3*   RBC  3.74*  3.87*   HEMOGLOBIN  11.6*  11.9*   HEMATOCRIT  35.3*  35.5*   MCV  94.4  91.7   MCH  31.0  30.7   MCHC  32.9*  33.5*   RDW  49.3  48.5   PLATELETCT  224  231   MPV  11.2  11.7     Recent Labs      05/01/18   0339  05/02/18   1225   SODIUM  135  139   POTASSIUM  4.4  4.7   CHLORIDE  107  109   CO2  17*  19*   GLUCOSE  135*  153*   BUN  22  39*   CREATININE  1.07  1.26   CALCIUM  8.4*  9.1     Recent Labs      04/30/18   1557   APTT  27.6   INR  1.14*         Recent Labs      05/01/18   0339   TRIGLYCERIDE  129   HDL  33*   LDL  48          Assessment/Plan     * NSTEMI (non-ST elevated myocardial infarction) (HCC)- (present on admission)   Assessment & Plan    Noted outside facility, transferred here for follow-up care, now status post cardiac cath with stenting  Continue DAPT, BB, ACE-I, High intensity statin therapy  Further recommendations per cardiology team          Encephalopathy- (present on admission)   Assessment & Plan    Acute, worsening  Likely Hospital / ICU delirium in the setting of underlying parkinson disease  Start Seroquel 100 mg HS  EKG in am tomorrow  Repeat CT head given use of DAPT / AC with PCI  Avoid sedative, narcotics / BZDs as able  If persistent tomorrow consider neurology evaluation  Aspiration / Fall precautions  Infectious work up check BCx, UA/ UCx, CXRAY  Close clinical monitoring   Detailed labs in am tomorrow        Leukocytosis- (present on admission)   Assessment & Plan    Etiology uncertain  Infectious etiologies cannot be ruled  Obtain infectious work up  Empiric ceftriaxone for now given encephalopathy        Acute systolic (congestive) heart failure (HCC)- (present on admission)   Assessment & Plan    Continue Lasix / Aldactone  BB / ACE-I  Cardiology team on board  Further recommendations per cardiology team  Palliative consultation for advance care planning         Severe aortic stenosis- (present on admission)   Assessment & Plan    Further recommendations per cardiology team         Anemia- (present on admission)   Assessment & Plan    Obtain Iron studies, b12, folate, retic  Monitor Hb / Restrictive transfusion strategy        Wheezing- (present on admission)   Assessment & Plan    BD therapy  RT protocol  Continue to monitor        Cognitive decline- (present on admission)   Assessment & Plan    In the setting of parkinson disease  Now encephalopathic  Obtain SLP cognition evaluation         Parkinson's disease (HCC)- (present on admission)   Assessment & Plan    Continue Sinemet and selegiline, home meds  Obtain PT/OT evaluation  Outpatient neurology evaluation         Seizure disorder (HCC)- (present on admission)   Assessment & Plan    No new seizures noted, continue Keppra and gabapentin at home doses  Encephalopathy, check EEG          Quality-Core Measures   Reviewed items::  Labs reviewed, Medications reviewed and Radiology  images reviewed  Thomas catheter::  No Thomas  DVT prophylaxis - mechanical:  SCDs  Antibiotics:  Treating active infection/contamination beyond 24 hours perioperative coverage  Assessed for rehabilitation services:  Patient was assess for and/or received rehabilitation services during this hospitalization

## 2018-05-03 NOTE — THERAPY
"Speech Language Therapy Evaluation completed to address cognition  Functional Status:  The patient was seen for cognitive-linguistic evaluation. The patient was awake, alert and noted to have non-fluttent (stutter-like) speech pattern with word finding difficulties. Patient self reported his speech is baseline and due to his \"anxiety.\" Patient's anxiety did appear to influence cognitive assessment results at times. Patient was given a variety of cognitive-linguistic assessments including the MOCA in addition to non-standardized assessments. Testing revealed a standard score of 21/30 on the MOCA however, non-standardized more functional cognitive assessment tasks revealed WFL with use of strategies. The patient self reported he utilizes strategies and routines to assist with completion of high level IADLS such as medication management and bill pay. At this time, suspect patient's current cognitive functioning is consistent with or close to baseline function. [place] neighbors/friends assist with ensuring safety and managing high level cognitive tasks.     Recommendations:  No further skilled SLP services are indicated at this level of care. Would recommend patient's support system is able to still assist at the level patient needs prior to discharge home, or consider home health for a home safety evaluation following medical discharge from this facility.     Plan of Care: Patient with no further skilled SLP needs in the acute care setting at this time.    Post-Acute Therapy: Discharge to home with outpatient or home health for additional skilled therapy services.    See \"Rehab Therapy-Acute\" Patient Summary Report for complete documentation.   "

## 2018-05-03 NOTE — PROCEDURES
DATE OF SERVICE:  05/03/2018    This inpatient EEG was done on 05/03/2018.    ROUTINE ELECTROENCEPHALOGRAM REPORT        NAME: Prince Pham     REFERRING Dr:     INDICATION:  Seizure              TECHNIQUE: 30 channel routine electroencephalogram (EEG) was performed in accordance with the international 10-20 system. The study was reviewed in bipolar and referential montages. The recording examined the patient during wakeful and drowsy state(s).      DESCRIPTION OF THE RECORD:           Background rhythm during awake stage shows well-organized, well-developed, average voltage 10 to 11 hertz alpha activity in the posterior regions.  It blocks with eye opening and it is bilaterally synchronous and symmetrical.  There are some epileptiform spike and sharp-and-wave discharges and lateralizing delta abnormalities over the left are seen.  Photic stimulation did not produce any abnormalities. Stage I sleep was achieved.        ACTIVATION PROCEDURES:       Photic Stimulation were done     ICTAL AND/OR INTERICTAL FINDINGS:    here are some epileptiform spike and sharp-and-wave discharges and lateralizing delta abnormalities over the left are seen  No clinical events or seizures were reported or recorded during the study.       EKG: sampling of the EKG recording demonstrated sinus rhythm.          INTERPRETATION:        ________________________________________________________________________     This scalp EEG is consistent with                  Probable Focal seizure disorder from the left ( because of interictal sharp and delta slow over left temporal)     The tremor of hands is not related with seizures though ( probably due to parkinson tremor?)     There are no active seizures in this record     Advise continue seizure medication and follow up in outpatient neurology clinic     EEG 05/03/18 4:38 PM     ________________________________________________________________________                            Shaking,  tremor       ____________________________________     MD JOSIAS CHAVEZ    DD:  05/03/2018 16:43:33  DT:  05/03/2018 16:47:36    D#:  [id number]  Job#:  [id number]

## 2018-05-03 NOTE — ASSESSMENT & PLAN NOTE
Resolved  2/2 Hospital / ICU delirium in the setting of underlying parkinson disease  Continue Seroquel 100 mg HS while in the hospital  Continue comfort measures  Aspiration / Fall precautions  Close clinical monitoring

## 2018-05-03 NOTE — CARE PLAN
Problem: Mobility  Goal: Risk for activity intolerance will decrease  Outcome: PROGRESSING AS EXPECTED  Turn Q2; bathe, moisturize. Monitor attempts to exit bed - contributing to bruising and skin integrity

## 2018-05-03 NOTE — CARE PLAN
Problem: Safety  Goal: Will remain free from injury  Outcome: PROGRESSING SLOWER THAN EXPECTED  Safety first: bed alarm; chair alarm. Frequent rounding. Continue to reinforce and educate

## 2018-05-03 NOTE — PROGRESS NOTES
Cardiology Progress Note               Author: Mercedes Veliz Date & Time created: 5/3/2018  11:20 AM     Cardiology consultation for non-STEMI ( trop peaked at 6.5 )      Presented with , transferred from [place] for chest pain and troponin elevation, peaked in the hospital to 6.5      History of coronary artery disease    s/p coronary angiography 18 s/p PTCA/PCI/RY ostial left circumflex (patent stent of ostial left main into p LAD, EF 20%, elevated LVEDP, elevated right heart pressure, probable low-flow low-grade and severe AS, calculated CRUZITO 0.60      BP 83/50  HR 60's NSR    Labs reviewed     Na, K, CR stable     Review of Systems   Respiratory: Negative for cough and shortness of breath.    Cardiovascular: Positive for leg swelling. Negative for chest pain, palpitations, orthopnea, claudication and PND.   Neurological: Positive for dizziness.       Physical Exam   Constitutional: He is oriented to person, place, and time.   HENT:   Head: Normocephalic.   Eyes: Conjunctivae are normal.   Neck: No JVD present. No thyromegaly present.   Cardiovascular: Normal rate and regular rhythm.    Abdominal: Soft.   Musculoskeletal: He exhibits edema.   Neurological: He is alert and oriented to person, place, and time.   Skin: Skin is warm and dry.       Hemodynamics:  Temp (24hrs), Av.4 °C (97.5 °F), Min:36.1 °C (96.9 °F), Max:37.6 °C (99.7 °F)  Temperature: 36.1 °C (96.9 °F)  Pulse  Av.5  Min: 62  Max: 122  Blood Pressure : (!) 83/51 (RN notified)     Respiratory:    Respiration: 18, Pulse Oximetry: 90 %, O2 Daily Delivery Respiratory : Silicone Nasal Cannula     Work Of Breathing / Effort: Moderate;Shallow;Tachypnea  RUL Breath Sounds: Clear, RML Breath Sounds: Clear, RLL Breath Sounds: Diminished, NOA Breath Sounds: Clear, LLL Breath Sounds: Diminished  Fluids:     Weight: 97.2 kg (214 lb 4.6 oz)  GI/Nutrition:  Orders Placed This Encounter   Procedures   • Diet Order     Standing Status:   Standing      Number of Occurrences:   1     Order Specific Question:   Diet:     Answer:   Cardiac [6]     Lab Results:  Recent Labs      05/01/18   0339  05/02/18   1225  05/03/18   0359   WBC  11.0*  12.3*  7.2   RBC  3.74*  3.87*  3.48*   HEMOGLOBIN  11.6*  11.9*  10.6*   HEMATOCRIT  35.3*  35.5*  33.8*   MCV  94.4  91.7  97.1   MCH  31.0  30.7  30.5   MCHC  32.9*  33.5*  31.4*   RDW  49.3  48.5  51.8*   PLATELETCT  224  231  225   MPV  11.2  11.7  11.3     Recent Labs      05/01/18   0339  05/02/18   1225  05/03/18   0359   SODIUM  135  139  139   POTASSIUM  4.4  4.7  3.9   CHLORIDE  107  109  110   CO2  17*  19*  18*   GLUCOSE  135*  153*  125*   BUN  22  39*  44*   CREATININE  1.07  1.26  1.15   CALCIUM  8.4*  9.1  8.8     Recent Labs      04/30/18   1557  05/03/18   0359   APTT  27.6   --    INR  1.14*  1.23*         Recent Labs      04/30/18   1557  04/30/18 2015   TROPONINI  5.20*  6.52*     Recent Labs      05/01/18   0339   TRIGLYCERIDE  129   HDL  33*   LDL  48         Medical Decision Making, by Problem:  Active Hospital Problems    Diagnosis   • *NSTEMI (non-ST elevated myocardial infarction) ([facility]) [I21.4]   • Encephalopathy [G93.40]   • Severe aortic stenosis [I35.0]   • Acute systolic (congestive) heart failure ([facility]) [I50.21]   • Leukocytosis [D72.829]   • Wheezing [R06.2]   • Anemia [D64.9]   • Cognitive decline [R41.89]   • Seizure disorder ([facility]) [G40.909]   • Parkinson's disease ([facility]) [G20]       Assessment/Plan:    Significant improvement in his cognitive function    s/p PTCA/RY oLCX 5/1/18     Continue with ASA 81, Brilinta 90 mg BID, Lipitor 40 mg,     SBP low 80's this am, reduce dose of  ACE-I and BB      EF 20%, on Spironolactone 25 mg, ACE-I , and BB     New systolic heart failure, on low dose lasix, clinically compensated     Likely low flow low gradient severe AS, plan for DST as out patient , watch BP    NO rhythm issues overnight , ectopies noted       Quality-Core Measures   Reviewed items::   Medications reviewed and Labs reviewed

## 2018-05-03 NOTE — ASSESSMENT & PLAN NOTE
Poor candidate for TAVR per cardiology team  Recommend hospice care  Discussed with patient 05/06/18  Patient wants to proceed with hospice / comfort measures at this time

## 2018-05-03 NOTE — DISCHARGE PLANNING
Anticipated Discharge Disposition: Home    Action: LSW met with pt at bedside to discuss discharge plans. Pt stated he does not have a ride home. LSW discussed Medicare rights with pt and pt signed IMM form. L[facility] (F: [phone]).     Barriers to Discharge: None    Plan: Set up transportation through [facility] tomorrow morning upon discharge.

## 2018-05-03 NOTE — CONSULTS
"Reason for PC Consult: Advance Care Planning    Consulted by: Dr. Cowan, Hosp    Assessment:  General: 73 y/o male tx from [place] for NSTEMI, elevated trop 3.54 and CP. EF 15%. Pt AOx4 today and able to participate in POC. PMHc of HTN, Parkinson's, colon/testicular CA, seizures, AS, anemia. He lives alone in [place]; son Bong in [place], brother Cecilio in [facility], and brother Al in [place]. His other son Keegan passed away a couple years ago in a MVA.    Consults: cardiology    Dyspnea: No-    Last BM: 05/03/18-    Pain: No-    Depression: Unable to determine-    Dementia: No (AMS);       Spiritual:  Is Worship or spirituality important for coping with this illness? Unable to determine-    Has a  or spiritual provider visit been requested? Unable to determine    Palliative Performance Scale: 50%    Advance Directive: None-    DPOA: No- NOK is son Bong   POLST: No-      Code Status: Full-      Outcome:  PC RN met with Prince at bedside and explained the role of PC. He expressed understanding of his current condition and states the plan is to go home tomorrow. He states that he has a mobile scooter that he uses to get around town, as well as transportation to get to and from appointments. He talks with Bong about once a week and he usually visits during the week as well. Prince expressed wanting to \"be at home as long as possible.\" His brother Cecilio arrived and was part of the conversation regarding wishes and POC. Prince states he has an AD with his  but would not give this RN their name. Rather, he chose to take this RNs information and pass it along to the legal team to contact this RN about getting a copy of the AD. PC RN discussed code status at length and despite stating \"When God decides it's time for me to go, then I'll go\" he wishes to remain a full code and \"attempt to resuscitate me.\" PC RN validated that wish. He denied having any needs/concerns at this time and was left to visit with " his family.      While talking to Prince, PC RN provided active listening, normalization, validation of thoughts and feelings, as well as reinforced his goals of care. PC contact information provided to Prince and encouraged to call with any questions or concerns.     Updated: MD/RN    Plan: home when medically cleared    Recommendations: I do not recommend an ethics or hospice consult at this time because patient not interested.    Thank you for allowing Palliative Care to participate in this patient's care. Please feel free to call [phone] with any questions or concerns.

## 2018-05-03 NOTE — ASSESSMENT & PLAN NOTE
Continue Lasix, lower dose  DC spironolactone (comfort care)  Continue metop  DC ACE-I (due to hypotension)  Appreciated cardiology recommendations  Patient wants to proceed with comfort measures and hospice care as able at his home

## 2018-05-04 ENCOUNTER — PATIENT OUTREACH (OUTPATIENT)
Dept: HEALTH INFORMATION MANAGEMENT | Facility: OTHER | Age: 73
End: 2018-05-04

## 2018-05-04 ENCOUNTER — APPOINTMENT (OUTPATIENT)
Dept: RADIOLOGY | Facility: MEDICAL CENTER | Age: 73
DRG: 246 | End: 2018-05-04
Attending: INTERNAL MEDICINE
Payer: MEDICARE

## 2018-05-04 LAB
ANION GAP SERPL CALC-SCNC: 9 MMOL/L (ref 0–11.9)
BNP SERPL-MCNC: 1192 PG/ML (ref 0–100)
BUN SERPL-MCNC: 42 MG/DL (ref 8–22)
CALCIUM SERPL-MCNC: 8.5 MG/DL (ref 8.5–10.5)
CHLORIDE SERPL-SCNC: 110 MMOL/L (ref 96–112)
CO2 SERPL-SCNC: 19 MMOL/L (ref 20–33)
CREAT SERPL-MCNC: 1.12 MG/DL (ref 0.5–1.4)
ERYTHROCYTE [DISTWIDTH] IN BLOOD BY AUTOMATED COUNT: 51.5 FL (ref 35.9–50)
GLUCOSE SERPL-MCNC: 180 MG/DL (ref 65–99)
HCT VFR BLD AUTO: 32.6 % (ref 42–52)
HGB BLD-MCNC: 10.3 G/DL (ref 14–18)
MCH RBC QN AUTO: 30.6 PG (ref 27–33)
MCHC RBC AUTO-ENTMCNC: 31.6 G/DL (ref 33.7–35.3)
MCV RBC AUTO: 96.7 FL (ref 81.4–97.8)
PLATELET # BLD AUTO: 244 K/UL (ref 164–446)
PMV BLD AUTO: 10.9 FL (ref 9–12.9)
POTASSIUM SERPL-SCNC: 4.7 MMOL/L (ref 3.6–5.5)
RBC # BLD AUTO: 3.37 M/UL (ref 4.7–6.1)
SODIUM SERPL-SCNC: 138 MMOL/L (ref 135–145)
WBC # BLD AUTO: 11.8 K/UL (ref 4.8–10.8)

## 2018-05-04 PROCEDURE — 700102 HCHG RX REV CODE 250 W/ 637 OVERRIDE(OP): Performed by: INTERNAL MEDICINE

## 2018-05-04 PROCEDURE — 700102 HCHG RX REV CODE 250 W/ 637 OVERRIDE(OP): Performed by: NURSE PRACTITIONER

## 2018-05-04 PROCEDURE — 80048 BASIC METABOLIC PNL TOTAL CA: CPT

## 2018-05-04 PROCEDURE — A9270 NON-COVERED ITEM OR SERVICE: HCPCS | Performed by: NURSE PRACTITIONER

## 2018-05-04 PROCEDURE — 99233 SBSQ HOSP IP/OBS HIGH 50: CPT | Performed by: INTERNAL MEDICINE

## 2018-05-04 PROCEDURE — A9270 NON-COVERED ITEM OR SERVICE: HCPCS | Performed by: INTERNAL MEDICINE

## 2018-05-04 PROCEDURE — G8988 SELF CARE GOAL STATUS: HCPCS | Mod: CJ

## 2018-05-04 PROCEDURE — 97167 OT EVAL HIGH COMPLEX 60 MIN: CPT

## 2018-05-04 PROCEDURE — 97535 SELF CARE MNGMENT TRAINING: CPT

## 2018-05-04 PROCEDURE — 36415 COLL VENOUS BLD VENIPUNCTURE: CPT

## 2018-05-04 PROCEDURE — 97530 THERAPEUTIC ACTIVITIES: CPT

## 2018-05-04 PROCEDURE — 85027 COMPLETE CBC AUTOMATED: CPT

## 2018-05-04 PROCEDURE — 71045 X-RAY EXAM CHEST 1 VIEW: CPT

## 2018-05-04 PROCEDURE — 83880 ASSAY OF NATRIURETIC PEPTIDE: CPT

## 2018-05-04 PROCEDURE — 94760 N-INVAS EAR/PLS OXIMETRY 1: CPT

## 2018-05-04 PROCEDURE — G8987 SELF CARE CURRENT STATUS: HCPCS | Mod: CL

## 2018-05-04 PROCEDURE — 700112 HCHG RX REV CODE 229: Performed by: INTERNAL MEDICINE

## 2018-05-04 PROCEDURE — 93970 EXTREMITY STUDY: CPT

## 2018-05-04 PROCEDURE — 770020 HCHG ROOM/CARE - TELE (206)

## 2018-05-04 RX ORDER — IPRATROPIUM BROMIDE AND ALBUTEROL SULFATE 2.5; .5 MG/3ML; MG/3ML
3 SOLUTION RESPIRATORY (INHALATION)
Status: DISCONTINUED | OUTPATIENT
Start: 2018-05-04 | End: 2018-05-16 | Stop reason: HOSPADM

## 2018-05-04 RX ORDER — FUROSEMIDE 40 MG/1
40 TABLET ORAL
Status: DISCONTINUED | OUTPATIENT
Start: 2018-05-04 | End: 2018-05-10

## 2018-05-04 RX ORDER — FUROSEMIDE 10 MG/ML
40 INJECTION INTRAMUSCULAR; INTRAVENOUS
Status: DISCONTINUED | OUTPATIENT
Start: 2018-05-04 | End: 2018-05-04

## 2018-05-04 RX ORDER — METOPROLOL SUCCINATE 25 MG/1
25 TABLET, EXTENDED RELEASE ORAL
Status: DISCONTINUED | OUTPATIENT
Start: 2018-05-04 | End: 2018-05-05

## 2018-05-04 RX ADMIN — FUROSEMIDE 20 MG: 20 TABLET ORAL at 06:25

## 2018-05-04 RX ADMIN — Medication 500 MCG: at 06:22

## 2018-05-04 RX ADMIN — THERA TABS 1 TABLET: TAB at 06:23

## 2018-05-04 RX ADMIN — FERROUS GLUCONATE 324 MG: 324 TABLET ORAL at 06:25

## 2018-05-04 RX ADMIN — DOXYCYCLINE 100 MG: 100 TABLET ORAL at 06:22

## 2018-05-04 RX ADMIN — DOCUSATE CALCIUM 240 MG: 240 CAPSULE, LIQUID FILLED ORAL at 06:30

## 2018-05-04 RX ADMIN — SPIRONOLACTONE 25 MG: 25 TABLET, FILM COATED ORAL at 06:25

## 2018-05-04 RX ADMIN — LEVETIRACETAM 1500 MG: 500 TABLET, FILM COATED ORAL at 18:06

## 2018-05-04 RX ADMIN — OXYCODONE HYDROCHLORIDE AND ACETAMINOPHEN 500 MG: 500 TABLET ORAL at 20:36

## 2018-05-04 RX ADMIN — POTASSIUM CHLORIDE 20 MEQ: 1500 TABLET, EXTENDED RELEASE ORAL at 06:24

## 2018-05-04 RX ADMIN — ASPIRIN 81 MG: 81 TABLET, COATED ORAL at 06:25

## 2018-05-04 RX ADMIN — DOCUSATE CALCIUM 240 MG: 240 CAPSULE, LIQUID FILLED ORAL at 18:07

## 2018-05-04 RX ADMIN — QUETIAPINE FUMARATE 100 MG: 25 TABLET ORAL at 20:36

## 2018-05-04 RX ADMIN — LISINOPRIL 2.5 MG: 5 TABLET ORAL at 06:23

## 2018-05-04 RX ADMIN — TICAGRELOR 90 MG: 90 TABLET ORAL at 06:25

## 2018-05-04 RX ADMIN — FERROUS GLUCONATE 324 MG: 324 TABLET ORAL at 16:26

## 2018-05-04 RX ADMIN — LEVETIRACETAM 1500 MG: 500 TABLET, FILM COATED ORAL at 06:24

## 2018-05-04 RX ADMIN — GABAPENTIN 300 MG: 300 CAPSULE ORAL at 20:36

## 2018-05-04 RX ADMIN — SELEGILINE HYDROCHLORIDE 5 MG: 5 TABLET ORAL at 20:36

## 2018-05-04 RX ADMIN — ATORVASTATIN CALCIUM 40 MG: 40 TABLET, FILM COATED ORAL at 18:07

## 2018-05-04 RX ADMIN — OXYCODONE HYDROCHLORIDE AND ACETAMINOPHEN 500 MG: 500 TABLET ORAL at 06:30

## 2018-05-04 RX ADMIN — SELEGILINE HYDROCHLORIDE 5 MG: 5 TABLET ORAL at 06:29

## 2018-05-04 RX ADMIN — CITALOPRAM HYDROBROMIDE 20 MG: 20 TABLET ORAL at 06:24

## 2018-05-04 RX ADMIN — FUROSEMIDE 40 MG: 40 TABLET ORAL at 18:06

## 2018-05-04 RX ADMIN — CARBIDOPA AND LEVODOPA 1.5 TABLET: 25; 100 TABLET ORAL at 20:38

## 2018-05-04 RX ADMIN — CARBIDOPA AND LEVODOPA 1.5 TABLET: 25; 100 TABLET ORAL at 12:52

## 2018-05-04 RX ADMIN — CARBIDOPA AND LEVODOPA 1.5 TABLET: 25; 100 TABLET ORAL at 16:25

## 2018-05-04 RX ADMIN — DOXYCYCLINE 100 MG: 100 TABLET ORAL at 18:07

## 2018-05-04 RX ADMIN — TICAGRELOR 90 MG: 90 TABLET ORAL at 18:07

## 2018-05-04 RX ADMIN — METOPROLOL SUCCINATE 25 MG: 25 TABLET, EXTENDED RELEASE ORAL at 18:08

## 2018-05-04 RX ADMIN — AMOXICILLIN AND CLAVULANATE POTASSIUM 1 TABLET: 875; 125 TABLET, FILM COATED ORAL at 06:21

## 2018-05-04 RX ADMIN — CARBIDOPA AND LEVODOPA 1.5 TABLET: 25; 100 TABLET ORAL at 06:30

## 2018-05-04 RX ADMIN — ACETAMINOPHEN 650 MG: 325 TABLET, FILM COATED ORAL at 02:07

## 2018-05-04 RX ADMIN — METOPROLOL TARTRATE 25 MG: 25 TABLET, FILM COATED ORAL at 06:25

## 2018-05-04 RX ADMIN — AMOXICILLIN AND CLAVULANATE POTASSIUM 1 TABLET: 875; 125 TABLET, FILM COATED ORAL at 18:07

## 2018-05-04 RX ADMIN — GABAPENTIN 300 MG: 300 CAPSULE ORAL at 15:35

## 2018-05-04 RX ADMIN — GABAPENTIN 300 MG: 300 CAPSULE ORAL at 06:22

## 2018-05-04 RX ADMIN — OXYCODONE HYDROCHLORIDE AND ACETAMINOPHEN 500 MG: 500 TABLET ORAL at 15:35

## 2018-05-04 RX ADMIN — DOXAZOSIN 2 MG: 2 TABLET ORAL at 06:29

## 2018-05-04 ASSESSMENT — COGNITIVE AND FUNCTIONAL STATUS - GENERAL
HELP NEEDED FOR BATHING: A LOT
PERSONAL GROOMING: A LOT
EATING MEALS: A LITTLE
STANDING UP FROM CHAIR USING ARMS: A LITTLE
MOBILITY SCORE: 16
MOVING TO AND FROM BED TO CHAIR: UNABLE
DRESSING REGULAR LOWER BODY CLOTHING: A LOT
MOVING FROM LYING ON BACK TO SITTING ON SIDE OF FLAT BED: A LITTLE
TOILETING: A LOT
DAILY ACTIVITIY SCORE: 13
DRESSING REGULAR UPPER BODY CLOTHING: A LOT
SUGGESTED CMS G CODE MODIFIER DAILY ACTIVITY: CL
WALKING IN HOSPITAL ROOM: A LITTLE
CLIMB 3 TO 5 STEPS WITH RAILING: A LOT
SUGGESTED CMS G CODE MODIFIER MOBILITY: CK

## 2018-05-04 ASSESSMENT — ENCOUNTER SYMPTOMS
SEIZURES: 0
CHILLS: 0
DEPRESSION: 0
ORTHOPNEA: 0
VOMITING: 0
TREMORS: 1
BLOOD IN STOOL: 0
NECK PAIN: 0
DIZZINESS: 1
BLURRED VISION: 0
DIAPHORESIS: 0
CLAUDICATION: 0
SPEECH CHANGE: 0
HEADACHES: 0
PND: 0
SPUTUM PRODUCTION: 0
HEARTBURN: 0
WEAKNESS: 0
LOSS OF CONSCIOUSNESS: 0
NAUSEA: 0
MYALGIAS: 0
HEMOPTYSIS: 0
SHORTNESS OF BREATH: 1
CONSTIPATION: 0
WEIGHT LOSS: 0
TINGLING: 0
WHEEZING: 0
SHORTNESS OF BREATH: 0
FALLS: 0
PALPITATIONS: 0
NERVOUS/ANXIOUS: 1
DIARRHEA: 0
COUGH: 0
BACK PAIN: 0
FLANK PAIN: 0
DIZZINESS: 0
DOUBLE VISION: 0
SENSORY CHANGE: 0
FOCAL WEAKNESS: 0
FEVER: 0
ABDOMINAL PAIN: 0

## 2018-05-04 ASSESSMENT — PAIN SCALES - GENERAL
PAINLEVEL_OUTOF10: 0
PAINLEVEL_OUTOF10: 0
PAINLEVEL_OUTOF10: ASSUMED PAIN PRESENT
PAINLEVEL_OUTOF10: 3
PAINLEVEL_OUTOF10: 0
PAINLEVEL_OUTOF10: 0

## 2018-05-04 ASSESSMENT — GAIT ASSESSMENTS
GAIT LEVEL OF ASSIST: MINIMAL ASSIST
DEVIATION: DECREASED BASE OF SUPPORT;SHUFFLED GAIT;DECREASED HEEL STRIKE;DECREASED TOE OFF;OTHER (COMMENT)
DISTANCE (FEET): 5

## 2018-05-04 ASSESSMENT — ACTIVITIES OF DAILY LIVING (ADL): TOILETING: INDEPENDENT

## 2018-05-04 NOTE — PROGRESS NOTES
Bedside report received from PROMISE Mc. POC discussed with pt; all questions answered at this time. White board updated. Appropriate functioning of tele monitor confirmed. All needs met at this time.

## 2018-05-04 NOTE — THERAPY
"Occupational Therapy Evaluation completed.   Functional Status: Spent extensive time calming patient w/verbal and tactile cues to slow breathing as pt was hyperventilating and appears easily anxious and easily startled w/loud noises. Gave frequent cues for pt to breath in through his nose and out through his mouth. During eval, ultra sound tech arrived pt again appeared to become very anxious, remained at bedside during test to assist w/calming patient. After completion pt agreeable to sitting EOB. Supine>sit EOB w/mod A, posterior lean, initially mod A w/sitting balance after facilitation w/feet touching floor improved to CGA w/sitting balance, min A w/brushing teeth. Max A w/LB dressing to don shoes. Agreeable to further OOB activity, CGA w/sit>stand w/HHA, mod A w/walking w/HHA festinating gait and kyphotic posture, however tends to lose his balance backwards. Txf to chair w/mod A. Remained up in chair w/PT present alarm on and call light w/in reach RN aware of session. Pt's bx appear more calm when someone is present to redirect pt discussed RN regarding possible sitter for safety and calming strategies.   Plan of Care: Will benefit from Occupational Therapy 4 times per week  Discharge Recommendations:  Equipment: Will Continue to Assess for Equipment Needs. Post-acute therapy Discharge to a transitional care facility for continued skilled therapy services.    See \"Rehab Therapy-Acute\" Patient Summary Report for complete documentation.    72 yr old male admitted for elevated troponin and chest pain. Pt has a hx of CA, HTN, parkinson's, seizure d/o, cervical fusion, and memory impairments (PTSD?). Pt is dx w/ NSTEMI, pt is demonstrating exacerbation of parkinson's symptoms w/impaired posture, gait, and coordination, pt also appears very anxious w/rapid breathing, poor eye contact and startle reflex w/loud noises, all significantly impacting ADL's and txfs pt reports living alone and having limited social supports " in [place]. Currently recommend post acute placement, pt may also benefit from psych consult to address current anxiety/PTSD symptoms.

## 2018-05-04 NOTE — RESPIRATORY CARE
COPD EDUCATION by COPD CLINICAL EDUCATOR  5/4/2018 at 6:58 AM by Linda Curry     Patient reviewed by COPD education team. Patient does not qualify for COPD program.

## 2018-05-04 NOTE — PROGRESS NOTES
Cardiology Progress Note               Author: Mercedes Veliz Date & Time created: 2018  1:14 PM     Cardiology consultation for non-STEMI ( trop peaked at 6.5 )      Presented with , transferred from [place] for chest pain and troponin elevation, peaked  6.5      History of coronary artery disease, parkinson disease, testicular cancer, hypertension, seizure disorder, cervical fusion posterior     s/p coronary angiography 18 s/p PTCA/PCI/RY ostial left circumflex (patent stent of ostial left main into p LAD, EF 20%, elevated LVEDP, elevated right heart pressure, probable low-flow low-grade and severe AS, calculated CRUZITO 0.60      BP 96/60  HR 60's NSR    Labs reviewed   Na, K, CR stable   BNP 1100      Limited echo 18, LVEF 15-20%, severe aortic stenosis    Review of Systems   Respiratory: Negative for cough and shortness of breath.    Cardiovascular: Negative for chest pain, palpitations, orthopnea, claudication, leg swelling and PND.   Neurological: Positive for dizziness.       Physical Exam   Constitutional: He is oriented to person, place, and time.   HENT:   Head: Normocephalic.   Eyes: Conjunctivae are normal.   Neck: No JVD present. No thyromegaly present.   Cardiovascular: Normal rate and regular rhythm.    Abdominal: Soft.   Musculoskeletal: He exhibits no edema.   Neurological: He is alert and oriented to person, place, and time.   Skin: Skin is warm and dry.       Hemodynamics:  Temp (24hrs), Av.8 °C (98.3 °F), Min:36.4 °C (97.5 °F), Max:37.2 °C (99 °F)  Temperature: 37.1 °C (98.7 °F)  Pulse  Av.8  Min: 59  Max: 122  Blood Pressure : (!) 96/60     Respiratory:    Respiration: (!) 24, Pulse Oximetry: 97 %, O2 Daily Delivery Respiratory : Silicone Nasal Cannula     Given By:: Mouthpiece, Work Of Breathing / Effort: Moderate;Shallow;Tachypnea  RUL Breath Sounds: Clear, RML Breath Sounds: Clear, RLL Breath Sounds: Diminished, NOA Breath Sounds: Clear, LLL Breath Sounds:  Diminished  Fluids:     Weight: 99.3 kg (218 lb 14.7 oz)  GI/Nutrition:  Orders Placed This Encounter   Procedures   • Diet Order     Standing Status:   Standing     Number of Occurrences:   1     Order Specific Question:   Diet:     Answer:   Cardiac [6]     Lab Results:  Recent Labs      05/02/18   1225  05/03/18   0359  05/04/18   0357   WBC  12.3*  7.2  11.8*   RBC  3.87*  3.48*  3.37*   HEMOGLOBIN  11.9*  10.6*  10.3*   HEMATOCRIT  35.5*  33.8*  32.6*   MCV  91.7  97.1  96.7   MCH  30.7  30.5  30.6   MCHC  33.5*  31.4*  31.6*   RDW  48.5  51.8*  51.5*   PLATELETCT  231  225  244   MPV  11.7  11.3  10.9     Recent Labs      05/02/18   1225  05/03/18   0359  05/04/18   0357   SODIUM  139  139  138   POTASSIUM  4.7  3.9  4.7   CHLORIDE  109  110  110   CO2  19*  18*  19*   GLUCOSE  153*  125*  180*   BUN  39*  44*  42*   CREATININE  1.26  1.15  1.12   CALCIUM  9.1  8.8  8.5     Recent Labs      05/03/18   0359   INR  1.23*     Recent Labs      05/04/18   0357   BNPBTYPENAT  1192*     Recent Labs      05/04/18   0357   BNPBTYPENAT  1192*             Medical Decision Making, by Problem:  Active Hospital Problems    Diagnosis   • *NSTEMI (non-ST elevated myocardial infarction) ([facility]) [I21.4]   • Encephalopathy [G93.40]   • Severe aortic stenosis [I35.0]   • Acute systolic (congestive) heart failure ([facility]) [I50.21]   • Leukocytosis [D72.829]   • Wheezing [R06.2]   • Anemia [D64.9]   • Cognitive decline [R41.89]   • Seizure disorder ([facility]) [G40.909]   • Parkinson's disease ([facility]) [G20]       Assessment/Plan:    Non-STEMI  - s/p PTCA/RY oLCX 5/1/18   -Continue with ASA 81, Brilinta 90 mg BID, Lipitor 40 mg,     New cardiomyopathy,combined valvular and  ischemic  - EF 20%,   -  Spironolactone 25 mg, lisinopril 2.5 milligrams, metoprolol 25 mg BID  - May need to hold meds to be able to give furosemide secondary to SBP<90      New systolic heart failure  -Valvular  -BNP 1100  - On furosemide 40 IV BID (a.m. dose was held due  to soft blood pressure)        Likely low flow low gradient severe AS  - plan for DST as out patient , watch BP    NO rhythm issues overnight , ectopies noted     Discharge planing       Quality-Core Measures   Reviewed items::  Medications reviewed and Labs reviewed

## 2018-05-04 NOTE — PROGRESS NOTES
Bedside report received. Patient A&O x4. Pt is still presenting with tachypnea and anxiety attacks. 3.5L NC. SR on the monitor.  No complaints of pain at this time. POC discussed with patient. Patient verbalized understanding. Call light and belongings within reach. Bed locked and in lowest position, alarm and fall precautions in place.

## 2018-05-04 NOTE — DISCHARGE PLANNING
Anticipated Discharge Disposition: Unknown at this time.     Action: LSW received a voicemail from Ileana Casey ([phone]) asking for a callback in regards to a medication for pt. LSW attempted to contact Ileana via phone. Telephone not in service. LSW emailed Ileana Casey [email or web address]) asking her to discontinue request for prescription since pt's discharge has been cancelled at this time.     Barriers to Discharge: None    Plan: No further d/c needs at this time until pt has a new d/c plan.

## 2018-05-04 NOTE — DISCHARGE PLANNING
Cardiac debility, admitted 4/30/2018 with chest pain / NSTEMI s/p PCI with hospital course c/b hospital induced delirium in the setting of parkinson's disease.  Ongoing medical management as well as therapy need. Anticipate post acute services to facilitate a successful transition to community home alone. Please consider a PMR to assist with discharge planning.

## 2018-05-04 NOTE — HEART FAILURE PROGRAM
Cardiovascular Nurse Navigator () Progress Note:    Discharge plan note today indicates that SW spoke with MD and that pt is not ready for DC today.    Per Dr. Cowan's note yesterday, patient has cleared mentally and has no skilled need. Plan is for walking O2 test and labs today. Palliative saw patient yesterday and he has opted to remain full code, full treat.     Therefore, because it seems possible that pt could discharge over the weekend, this [employer] has asked hospital schedulers to move up patient's May 14 appointment:    May 11, 2018  9:00 AM PDT  Heart Failure New with LAMIN Gonzalez  [facility] (--) [address], [address]  [place] [address]  [phone]     As a reminder, the HF Care Bundle/Core Measures consist of:      1. A seven calendar day HF f/u appointment on the AVS, unless patient goes to SNF, inpatient rehab, or d/c with hospice.  2. HF discharge instructions discussed with patient and on the AVS and HF patient education packet provided and discussed  3. Appropriate medications for EF <40%: ACE/ARB or reason from MD why not prescribing in a note.  4. Documentation of left ventricular systolic assessment (echo or angio) or reason from MD why this is not done or will be done outpatient in a note.    Thank you and please call with questions Monday through Friday, Monica

## 2018-05-04 NOTE — ASSESSMENT & PLAN NOTE
Improved  This is 2/2 acute cardiogenic pulmonary edema POA and b/l pleural effusions in the setting of ACS / CHF  Continue PO Lasix, 20mg daily  DVT duplex obtaiend and negative. CTA PE negative.   Comfort care

## 2018-05-04 NOTE — THERAPY
"Physical Therapy Treatment completed.   Bed Mobility:  Supine to Sit: Moderate Assist  Transfers: Sit to Stand: Contact Guard Assist (from EOB)  Gait: Level Of Assist: Minimal Assist (x2) with HHA; will attempt trial of 4WW next visit given hx of PD and pt used to 4WW for ambulation  Plan of Care: Will benefit from Physical Therapy 3 times per week  Discharge Recommendations: Equipment: Will Continue to Assess for Equipment Needs. See below    Pt progressing slowly though as expected. He is able to demonstrate sit<>stands and very short ambulation with min A (x2 for safety with HHA). He is perseverative on WOB/SOB throughout visit. Noted that pt can be redirected intemittently though returns to perseverative behavior (possible component of PD/meds?) He demonstrates consistent posterior lean in sitting with delayed motor control to maintain midline without physical assist as well as impaired gait mechanics with ambulation. Of concern is pt reports word finding issues for past few months. Will continue to visit while here and currently anticipate pt unable to effectively return home without additional caregiver assist given current presentation as pt lives alone, needs to be I with ADls and majority of IADLs and has steps to enter/exit home. Possible psych and or pharm consult as appears anxiety and medication side effects may be affecting behavior and functional activity at this time (as pt is unsure if he is taking his PD meds on time and reports he is taking \"a lot\" of medicine currently/possible polypharm affecting PD symptom presentation).      See \"Rehab Therapy-Acute\" Patient Summary Report for complete documentation.       "

## 2018-05-04 NOTE — DISCHARGE PLANNING
Anticipated Discharge Disposition: Anticipating Home    Action: LSW discussed pt with doctor.  stated pt is not medically cleared to discharge this morning.     Barriers to Discharge: Medical Clearance    Plan: Awaiting medical clearance and further anticipated d/c needs.

## 2018-05-04 NOTE — PROGRESS NOTES
[facility] Hospitalist Progress Note    Date of Service: 5/3/2018    Chief Complaint  72 y.o. male admitted 4/30/2018 with chest pain / NSTEMI s/p PCI with hospital course c/b hospital induced delirium in the setting of parkinson's disease.     Interval Problem Update  Patient seen and evaluated on rounds  Encephalopathy has resolved  A&OX 4 now  Per nursing ambulating well  No SNF needs noted  Patient wants to discharge home  Appreciate palliative team input  May need home O2  Repeat O2 desaturation tomorrow with ambulation  CXRAY / BNP tomorrow   Continue seroquel 100 mg HS  No other concerns per nursing team  Discussed with Social workers  Needs to have medications arranged    Consultants/Specialty  Cardiology    Disposition  Continue telemetry monitoring  Patient wants to discharge home  No [place] services where he lives  Adequate support available to him locally where he lives  Can be discharged home with O2   to arrange medications        Review of Systems   Constitutional: Negative for chills, diaphoresis, fever, malaise/fatigue and weight loss.   HENT: Negative for hearing loss and tinnitus.    Eyes: Negative for blurred vision and double vision.   Respiratory: Positive for shortness of breath. Negative for cough, hemoptysis, sputum production and wheezing.    Cardiovascular: Negative for chest pain, palpitations, orthopnea, claudication, leg swelling and PND.   Gastrointestinal: Negative for abdominal pain, blood in stool, constipation, diarrhea, heartburn, melena, nausea and vomiting.   Genitourinary: Negative for dysuria, flank pain, frequency, hematuria and urgency.   Musculoskeletal: Negative for back pain, falls, joint pain, myalgias and neck pain.   Skin: Negative for itching and rash.   Neurological: Positive for tremors. Negative for dizziness, tingling, sensory change, speech change, focal weakness, seizures, loss of consciousness, weakness and headaches.   Psychiatric/Behavioral: Negative  for depression. The patient is nervous/anxious.       Physical Exam  Laboratory/Imaging   Hemodynamics  Temp (24hrs), Av.3 °C (97.3 °F), Min:36.1 °C (96.9 °F), Max:37.1 °C (98.7 °F)   Temperature: 37.1 °C (98.7 °F)  Pulse  Av.7  Min: 62  Max: 122   Blood Pressure : 105/60      Respiratory      Respiration: 16, Pulse Oximetry: 93 %, O2 Daily Delivery Respiratory : Silicone Nasal Cannula     Given By:: Mouthpiece, Work Of Breathing / Effort: Mild  RUL Breath Sounds: Clear, RML Breath Sounds: Clear, RLL Breath Sounds: Diminished, NOA Breath Sounds: Clear, LLL Breath Sounds: Diminished    Fluids    Intake/Output Summary (Last 24 hours) at 18 182  Last data filed at 18 1200   Gross per 24 hour   Intake              200 ml   Output              600 ml   Net             -400 ml       Nutrition  Orders Placed This Encounter   Procedures   • Diet Order     Standing Status:   Standing     Number of Occurrences:   1     Order Specific Question:   Diet:     Answer:   Cardiac [6]     Physical Exam   Constitutional: He is oriented to person, place, and time. He appears well-developed.  Non-toxic appearance. No distress.   HENT:   Head: Normocephalic and atraumatic.   Mouth/Throat: Oropharynx is clear and moist. No oropharyngeal exudate.   Eyes: Conjunctivae are normal. Pupils are equal, round, and reactive to light. Right eye exhibits no discharge. Left eye exhibits no discharge. No scleral icterus.   Neck: Normal range of motion. No JVD present. No edema and no erythema present.   Cardiovascular: Normal rate and regular rhythm.  Exam reveals no gallop and no friction rub.    Murmur heard.  Pulmonary/Chest: Effort normal. No stridor. No respiratory distress. He has no wheezes. He has rales. He exhibits no tenderness.   Abdominal: Soft. Bowel sounds are normal. He exhibits no distension. There is no tenderness. There is no rebound.   Musculoskeletal: Normal range of motion. He exhibits edema (Minimal). He  exhibits no tenderness or deformity.   Neurological: He is alert and oriented to person, place, and time. He has normal reflexes. No cranial nerve deficit.   Skin: Skin is warm and dry. No rash noted. He is not diaphoretic. No erythema.   Psychiatric: He has a normal mood and affect. Judgment and thought content normal. He is slowed. Cognition and memory are impaired.   Anxious   Nursing note and vitals reviewed.      Recent Labs      05/01/18   0339 05/02/18 1225  05/03/18   0359   WBC  11.0*  12.3*  7.2   RBC  3.74*  3.87*  3.48*   HEMOGLOBIN  11.6*  11.9*  10.6*   HEMATOCRIT  35.3*  35.5*  33.8*   MCV  94.4  91.7  97.1   MCH  31.0  30.7  30.5   MCHC  32.9*  33.5*  31.4*   RDW  49.3  48.5  51.8*   PLATELETCT  224  231  225   MPV  11.2  11.7  11.3     Recent Labs      05/01/18   0339  05/02/18   1225  05/03/18   0359   SODIUM  135  139  139   POTASSIUM  4.4  4.7  3.9   CHLORIDE  107  109  110   CO2  17*  19*  18*   GLUCOSE  135*  153*  125*   BUN  22  39*  44*   CREATININE  1.07  1.26  1.15   CALCIUM  8.4*  9.1  8.8     Recent Labs      05/03/18   0359   INR  1.23*         Recent Labs      05/01/18   0339   TRIGLYCERIDE  129   HDL  33*   LDL  48          Assessment/Plan     * NSTEMI (non-ST elevated myocardial infarction) (HCC)- (present on admission)   Assessment & Plan    Noted outside facility, transferred here for follow-up care, now status post cardiac cath with stenting  Continue DAPT, BB, ACE-I, High intensity statin therapy  Further recommendations per cardiology team   Close outpatient cardiology follow up        Acute hypoxemic respiratory failure (HCC)- (present on admission)   Assessment & Plan    Persistent   This is 2/2 acute cardiogenic pulmonary edema POA  Lasix 30 mg IV today  Abx management  Wean off O2 as able  O2 ambulatory desaturation study prior to discharge  CXRAY / BNP in am tmorrow        Leukocytosis- (present on admission)   Assessment & Plan    Etiology uncertain  Underlying  pneumonia cannot be ruled out  Improved with IV ceftriaxone x 1  UA is negative  Encephalopathy improved  Given advanced age, belief empiric abx therapy in the setting of elevated ESR, CRP, Procalcitonin and encephalopathy is reasonable   De escalate to PO Augmentin / Doxycycline tomorrow  Aim total 7 days of therapy        Acute systolic (congestive) heart failure (HCC)- (present on admission)   Assessment & Plan    Continue Lasix / Aldactone  BB / ACE-I  Cardiology team on board  Further recommendations per cardiology team  Palliative consultation for advance care planning obtained  Close outpatient cardiology follow up        Severe aortic stenosis- (present on admission)   Assessment & Plan    Further recommendations per cardiology team   Plan for outpatient TAVR evaluation         Encephalopathy- (present on admission)   Assessment & Plan    Resolved  2/2 Hospital / ICU delirium in the setting of underlying parkinson disease  Continue Seroquel 100 mg HS while in the hospital  Avoid sedative, narcotics / BZDs as able  Aspiration / Fall precautions  Empiric abx (PNA / Infectious concerns cannot be ruled out)  Close clinical monitoring         Anemia- (present on admission)   Assessment & Plan    Likely AOCD  Iron / MV supplementation   Monitor Hb / Restrictive transfusion strategy  Close monitoring per PCP in outpatient setting        Wheezing- (present on admission)   Assessment & Plan    Resolved  BD therapy  RT protocol  Continue to monitor        Cognitive decline- (present on admission)   Assessment & Plan    In the setting of parkinson disease  Back to baseline  Appreciate SLP input        Parkinson's disease (HCC)- (present on admission)   Assessment & Plan    Continue Sinemet and selegiline, home meds  Ambulating well within hospital wards  SOB limiting ambulation  Outpatient neurology evaluation         Seizure disorder (HCC)- (present on admission)   Assessment & Plan    No new seizures noted, continue  Keppra and gabapentin at home doses  Encephalopathy, EEG negative  Encephalopathy has resolved now          Quality-Core Measures   Reviewed items::  Labs reviewed, Medications reviewed and Radiology images reviewed  Thomas catheter::  No Thomas  DVT prophylaxis - mechanical:  SCDs  Antibiotics:  Treating active infection/contamination beyond 24 hours perioperative coverage  Assessed for rehabilitation services:  Patient was assess for and/or received rehabilitation services during this hospitalization

## 2018-05-04 NOTE — CARE PLAN
Problem: Communication  Goal: The ability to communicate needs accurately and effectively will improve    Intervention: Educate patient and significant other/support system about the plan of care, procedures, treatments, medications and allow for questions  White board updated with POC and care team information during bedside report.      Problem: Respiratory:  Goal: Respiratory status will improve    Intervention: Educate and encourage incentive spirometry usage  Pt educated regarding proper use of incentive spirometer. Encouraged frequent use to assist in weaning of o2.

## 2018-05-04 NOTE — DISCHARGE PLANNING
Anticipated Discharge Disposition: SNF with [facility]     Action: LSW discussed possible transfer with Charity from [facility]. Charity stated that [facility] will most likely not be able to take pt until Monday if accepted. LSW faxed over information Charity requested ([phone]).     Barriers to Discharge: None    Plan: Awaiting acceptance from [facility] SNF.

## 2018-05-05 LAB
ANION GAP SERPL CALC-SCNC: 10 MMOL/L (ref 0–11.9)
BACTERIA UR CULT: NORMAL
BUN SERPL-MCNC: 39 MG/DL (ref 8–22)
CALCIUM SERPL-MCNC: 8.6 MG/DL (ref 8.5–10.5)
CHLORIDE SERPL-SCNC: 110 MMOL/L (ref 96–112)
CO2 SERPL-SCNC: 19 MMOL/L (ref 20–33)
CREAT SERPL-MCNC: 1.11 MG/DL (ref 0.5–1.4)
ERYTHROCYTE [DISTWIDTH] IN BLOOD BY AUTOMATED COUNT: 51.6 FL (ref 35.9–50)
GLUCOSE SERPL-MCNC: 132 MG/DL (ref 65–99)
HCT VFR BLD AUTO: 33 % (ref 42–52)
HGB BLD-MCNC: 10.6 G/DL (ref 14–18)
MCH RBC QN AUTO: 30.8 PG (ref 27–33)
MCHC RBC AUTO-ENTMCNC: 32.1 G/DL (ref 33.7–35.3)
MCV RBC AUTO: 95.9 FL (ref 81.4–97.8)
PLATELET # BLD AUTO: 235 K/UL (ref 164–446)
PMV BLD AUTO: 10.8 FL (ref 9–12.9)
POTASSIUM SERPL-SCNC: 4.1 MMOL/L (ref 3.6–5.5)
RBC # BLD AUTO: 3.44 M/UL (ref 4.7–6.1)
SIGNIFICANT IND 70042: NORMAL
SITE SITE: NORMAL
SODIUM SERPL-SCNC: 139 MMOL/L (ref 135–145)
SOURCE SOURCE: NORMAL
WBC # BLD AUTO: 7.6 K/UL (ref 4.8–10.8)

## 2018-05-05 PROCEDURE — A9270 NON-COVERED ITEM OR SERVICE: HCPCS | Performed by: INTERNAL MEDICINE

## 2018-05-05 PROCEDURE — 700102 HCHG RX REV CODE 250 W/ 637 OVERRIDE(OP): Performed by: INTERNAL MEDICINE

## 2018-05-05 PROCEDURE — 80048 BASIC METABOLIC PNL TOTAL CA: CPT

## 2018-05-05 PROCEDURE — 700102 HCHG RX REV CODE 250 W/ 637 OVERRIDE(OP): Performed by: NURSE PRACTITIONER

## 2018-05-05 PROCEDURE — 85027 COMPLETE CBC AUTOMATED: CPT

## 2018-05-05 PROCEDURE — 83036 HEMOGLOBIN GLYCOSYLATED A1C: CPT

## 2018-05-05 PROCEDURE — 36415 COLL VENOUS BLD VENIPUNCTURE: CPT

## 2018-05-05 PROCEDURE — A9270 NON-COVERED ITEM OR SERVICE: HCPCS | Performed by: NURSE PRACTITIONER

## 2018-05-05 PROCEDURE — 770020 HCHG ROOM/CARE - TELE (206)

## 2018-05-05 PROCEDURE — 99233 SBSQ HOSP IP/OBS HIGH 50: CPT | Performed by: INTERNAL MEDICINE

## 2018-05-05 PROCEDURE — 700112 HCHG RX REV CODE 229: Performed by: INTERNAL MEDICINE

## 2018-05-05 RX ORDER — METOPROLOL SUCCINATE 25 MG/1
12.5 TABLET, EXTENDED RELEASE ORAL EVERY EVENING
Qty: 30 TAB | Refills: 3 | Status: SHIPPED | OUTPATIENT
Start: 2018-05-05

## 2018-05-05 RX ORDER — SPIRONOLACTONE 25 MG/1
12.5 TABLET ORAL
Status: DISCONTINUED | OUTPATIENT
Start: 2018-05-05 | End: 2018-05-10

## 2018-05-05 RX ORDER — FUROSEMIDE 40 MG/1
40 TABLET ORAL DAILY
Qty: 30 TAB | Refills: 3 | Status: SHIPPED | OUTPATIENT
Start: 2018-05-05 | End: 2018-05-05

## 2018-05-05 RX ORDER — FUROSEMIDE 20 MG/1
20 TABLET ORAL DAILY
Qty: 30 TAB | Refills: 3 | Status: SHIPPED | OUTPATIENT
Start: 2018-05-05 | End: 2018-05-14

## 2018-05-05 RX ORDER — METOPROLOL SUCCINATE 25 MG/1
12.5 TABLET, EXTENDED RELEASE ORAL EVERY EVENING
Status: DISCONTINUED | OUTPATIENT
Start: 2018-05-05 | End: 2018-05-16 | Stop reason: HOSPADM

## 2018-05-05 RX ORDER — SPIRONOLACTONE 25 MG/1
12.5 TABLET ORAL DAILY
Qty: 30 TAB | Refills: 3 | Status: SHIPPED | OUTPATIENT
Start: 2018-05-05 | End: 2018-05-14

## 2018-05-05 RX ADMIN — LEVETIRACETAM 1500 MG: 500 TABLET, FILM COATED ORAL at 06:33

## 2018-05-05 RX ADMIN — DOXYCYCLINE 100 MG: 100 TABLET ORAL at 18:40

## 2018-05-05 RX ADMIN — QUETIAPINE FUMARATE 100 MG: 25 TABLET ORAL at 20:41

## 2018-05-05 RX ADMIN — FERROUS GLUCONATE 324 MG: 324 TABLET ORAL at 06:38

## 2018-05-05 RX ADMIN — SALINE NASAL SPRAY 2 SPRAY: 1.5 SOLUTION NASAL at 16:04

## 2018-05-05 RX ADMIN — DOCUSATE CALCIUM 240 MG: 240 CAPSULE, LIQUID FILLED ORAL at 06:29

## 2018-05-05 RX ADMIN — CARBIDOPA AND LEVODOPA 1.5 TABLET: 25; 100 TABLET ORAL at 16:04

## 2018-05-05 RX ADMIN — ATORVASTATIN CALCIUM 40 MG: 40 TABLET, FILM COATED ORAL at 18:40

## 2018-05-05 RX ADMIN — CITALOPRAM HYDROBROMIDE 20 MG: 20 TABLET ORAL at 06:29

## 2018-05-05 RX ADMIN — TICAGRELOR 90 MG: 90 TABLET ORAL at 18:41

## 2018-05-05 RX ADMIN — OXYCODONE HYDROCHLORIDE AND ACETAMINOPHEN 500 MG: 500 TABLET ORAL at 18:40

## 2018-05-05 RX ADMIN — LEVETIRACETAM 1500 MG: 500 TABLET, FILM COATED ORAL at 18:42

## 2018-05-05 RX ADMIN — FERROUS GLUCONATE 324 MG: 324 TABLET ORAL at 18:40

## 2018-05-05 RX ADMIN — GABAPENTIN 300 MG: 300 CAPSULE ORAL at 06:31

## 2018-05-05 RX ADMIN — CARBIDOPA AND LEVODOPA 1.5 TABLET: 25; 100 TABLET ORAL at 21:43

## 2018-05-05 RX ADMIN — OXYCODONE HYDROCHLORIDE AND ACETAMINOPHEN 500 MG: 500 TABLET ORAL at 13:39

## 2018-05-05 RX ADMIN — SELEGILINE HYDROCHLORIDE 5 MG: 5 TABLET ORAL at 06:29

## 2018-05-05 RX ADMIN — AMOXICILLIN AND CLAVULANATE POTASSIUM 1 TABLET: 875; 125 TABLET, FILM COATED ORAL at 18:40

## 2018-05-05 RX ADMIN — AMOXICILLIN AND CLAVULANATE POTASSIUM 1 TABLET: 875; 125 TABLET, FILM COATED ORAL at 06:42

## 2018-05-05 RX ADMIN — ASPIRIN 81 MG: 81 TABLET, COATED ORAL at 06:31

## 2018-05-05 RX ADMIN — THERA TABS 1 TABLET: TAB at 06:33

## 2018-05-05 RX ADMIN — GABAPENTIN 300 MG: 300 CAPSULE ORAL at 18:40

## 2018-05-05 RX ADMIN — OXYCODONE HYDROCHLORIDE AND ACETAMINOPHEN 500 MG: 500 TABLET ORAL at 06:32

## 2018-05-05 RX ADMIN — TICAGRELOR 90 MG: 90 TABLET ORAL at 06:38

## 2018-05-05 RX ADMIN — Medication 500 MCG: at 06:38

## 2018-05-05 RX ADMIN — GABAPENTIN 300 MG: 300 CAPSULE ORAL at 13:39

## 2018-05-05 RX ADMIN — CARBIDOPA AND LEVODOPA 1.5 TABLET: 25; 100 TABLET ORAL at 06:42

## 2018-05-05 RX ADMIN — POTASSIUM CHLORIDE 20 MEQ: 1500 TABLET, EXTENDED RELEASE ORAL at 06:33

## 2018-05-05 RX ADMIN — SELEGILINE HYDROCHLORIDE 5 MG: 5 TABLET ORAL at 20:41

## 2018-05-05 RX ADMIN — CARBIDOPA AND LEVODOPA 1.5 TABLET: 25; 100 TABLET ORAL at 13:38

## 2018-05-05 RX ADMIN — DOXYCYCLINE 100 MG: 100 TABLET ORAL at 06:38

## 2018-05-05 RX ADMIN — METOPROLOL SUCCINATE 12.5 MG: 25 TABLET, EXTENDED RELEASE ORAL at 18:51

## 2018-05-05 ASSESSMENT — ENCOUNTER SYMPTOMS
PALPITATIONS: 0
SHORTNESS OF BREATH: 0
DIZZINESS: 1
COUGH: 0
FEVER: 0
SHORTNESS OF BREATH: 1
HEMOPTYSIS: 0
TINGLING: 0
DIARRHEA: 0
FALLS: 0
BACK PAIN: 0
NAUSEA: 0
LOSS OF CONSCIOUSNESS: 0
SENSORY CHANGE: 0
TREMORS: 1
SPEECH CHANGE: 0
HEADACHES: 0
CLAUDICATION: 0
BLURRED VISION: 0
CHILLS: 0
FLANK PAIN: 0
WHEEZING: 0
WEAKNESS: 0
VOMITING: 0
PND: 0
ABDOMINAL PAIN: 0
FOCAL WEAKNESS: 0
SPUTUM PRODUCTION: 0
DOUBLE VISION: 0
ORTHOPNEA: 0
SEIZURES: 0
DIZZINESS: 0
HEARTBURN: 0
DEPRESSION: 0
CONSTIPATION: 0
NERVOUS/ANXIOUS: 1
BLOOD IN STOOL: 0
WEIGHT LOSS: 0
MYALGIAS: 0
NECK PAIN: 0
DIAPHORESIS: 0

## 2018-05-05 ASSESSMENT — PAIN SCALES - GENERAL
PAINLEVEL_OUTOF10: 0

## 2018-05-05 NOTE — CONSULTS
Medical chart review completed.     Patient is a 72 y.o. year-old male with a past medical history significant for N STEMI status post coronary angiography with ejection fraction of 20%, severe aortic stenosis.  Admitted to [facility] on 4/30/2018  3:51 PM     Patient lives alone with no caregiver available, steps to enter his home.  Concerns from the physical therapist that the patient will not be able to transition directly to home.    PMH:  Past Medical History:   Diagnosis Date   • colon Cancer     surgery 8/2006, now in remission, testicular   • Hypertension    • Infectious disease     had shingles December 2013   • Parkinson's disease    • Seizure disorder (CMS-HCC)     last one ? 2010, from head injury   • Testicular cancer (CMS-HCC)        PSH:  Past Surgical History:   Procedure Laterality Date   • CERVICAL FUSION POSTERIOR  9/4/2014    Performed by Eligio Bella M.D. at SURGERY [facility]       FAMILY HISTORY:  No family history on file.    MEDICATIONS:  Current Facility-Administered Medications   Medication Dose   • metoprolol SR (TOPROL XL) tablet 12.5 mg  12.5 mg   • spironolactone (ALDACTONE) tablet 12.5 mg  12.5 mg   • ipratropium-albuterol (DUONEB) nebulizer solution  3 mL   • furosemide (LASIX) tablet 40 mg  40 mg   • sodium chloride (OCEAN) 0.65 % nasal spray 2 Spray  2 Spray   • lisinopril (PRINIVIL) tablet 2.5 mg  2.5 mg   • amoxicillin-clavulanate (AUGMENTIN) 875-125 MG per tablet 1 Tab  1 Tab   • doxycycline monohydrate (ADOXA) tablet 100 mg  100 mg   • potassium chloride SA (Kdur) tablet 20 mEq  20 mEq   • ascorbic acid tablet 500 mg  500 mg   • ferrous gluconate (FERGON) tablet 324 mg  324 mg   • multivitamin (THERAGRAN) tablet 1 Tab  1 Tab   • QUEtiapine (SEROQUEL) tablet 100 mg  100 mg   • aspirin EC (ECOTRIN) tablet 81 mg  81 mg   • ticagrelor (BRILINTA) tablet 90 mg  90 mg   • carbidopa-levodopa (SINEMET)  MG tablet 1.5 Tab  1.5 Tab   • citalopram (CELEXA) tablet  20 mg  20 mg   • cyanocobalamin (VITAMIN B-12) tablet 500 mcg  500 mcg   • docusate calcium (SURFAK) capsule 240 mg  240 mg   • gabapentin (NEURONTIN) capsule 300 mg  300 mg   • levETIRAcetam (KEPPRA) tablet 1,500 mg  1,500 mg   • meclizine (ANTIVERT) tablet 12.5 mg  12.5 mg   • selegiline (ELDEPRYL) tablet 5 mg  5 mg   • Respiratory Care per Protocol     • acetaminophen (TYLENOL) tablet 650 mg  650 mg   • nitroglycerin (NITROSTAT) tablet 0.4 mg  0.4 mg   • atorvastatin (LIPITOR) tablet 40 mg  40 mg       ALLERGIES:  Nkda [no known drug allergy]    PSYCHOSOCIAL HISTORY:  Living Site:  Per [facility]  Living With:  Per [facility]  Caregiver's availability:  Per [facility]  Number of stairs:  Per [facility]  Substance use history:  Per [facility]    Assessment  Cardiomyopathy, ejection fraction of 20%, severe aortic stenosis, functional decline, impaired ADLs.  Physical therapy notes show concern for unsafe home discharge without a caregiver.  Poor endurance.     Recommendations:  · Per documentation the patient has unlikely to have a home safe discharge, unlikely to progress and be able to tolerate 3 hours of intense therapy per day at inpatient rehabilitation.  · Recommend skilled nursing facility discharge with physical therapy and Occupational Therapy  · Consider outpatient cardiac rehabilitation program following SNF discharge      Hardeep Benavidez MD  Physical Medicine and Rehabilitation  [facility]  5/5/2018   11:23 AM

## 2018-05-05 NOTE — PROGRESS NOTES
Bedside report received from PROMISE Ascencio. POC discussed with pt; all questions answered at this time. White board updated. Appropriate functioning of tele monitor confirmed. All needs met at this time.

## 2018-05-05 NOTE — PROGRESS NOTES
[facility] Hospitalist Progress Note    Date of Service: 2018    Chief Complaint  72 y.o. male admitted 2018 with chest pain / NSTEMI s/p PCI with hospital course c/b hospital induced delirium in the setting of parkinson's disease.     Interval Problem Update  Patient seen and evaluated on rounds  Encephalopathy has resolved  A&OX 4 now  Persistent SOB with ambulation  Lives remotely  Discussed with PT/OT  Recommend SNF now  DVT study negative  Appreciate cardiology team input  No other concerns per nursing team  Discussed with Social workers  Needs to have medications arranged    Consultants/Specialty  Cardiology    Disposition  Continue telemetry monitoring  Patient wants to discharge home  Recommend SNF  Working on SNF placement at this time  Discussed with SW        Review of Systems   Constitutional: Negative for chills, diaphoresis, fever, malaise/fatigue and weight loss.   HENT: Negative for hearing loss and tinnitus.    Eyes: Negative for blurred vision and double vision.   Respiratory: Positive for shortness of breath. Negative for cough, hemoptysis, sputum production and wheezing.    Cardiovascular: Negative for chest pain, palpitations, orthopnea, claudication, leg swelling and PND.   Gastrointestinal: Negative for abdominal pain, blood in stool, constipation, diarrhea, heartburn, melena, nausea and vomiting.   Genitourinary: Negative for dysuria, flank pain, frequency, hematuria and urgency.   Musculoskeletal: Negative for back pain, falls, joint pain, myalgias and neck pain.   Skin: Negative for itching and rash.   Neurological: Positive for tremors. Negative for dizziness, tingling, sensory change, speech change, focal weakness, seizures, loss of consciousness, weakness and headaches.   Psychiatric/Behavioral: Negative for depression. The patient is nervous/anxious.       Physical Exam  Laboratory/Imaging   Hemodynamics  Temp (24hrs), Av.8 °C (98.2 °F), Min:36.5 °C (97.7 °F), Max:37.1 °C (98.7  °F)   Temperature: 36.7 °C (98.1 °F)  Pulse  Av.1  Min: 59  Max: 122   Blood Pressure : 102/61      Respiratory      Respiration: 18, Pulse Oximetry: 99 %, O2 Daily Delivery Respiratory : Silicone Nasal Cannula     Work Of Breathing / Effort: Moderate;Shallow;Tachypnea  RUL Breath Sounds: Clear, RML Breath Sounds: Clear, RLL Breath Sounds: Diminished, NOA Breath Sounds: Clear, LLL Breath Sounds: Diminished    Fluids    Intake/Output Summary (Last 24 hours) at 18  Last data filed at 18 1800   Gross per 24 hour   Intake              240 ml   Output              150 ml   Net               90 ml       Nutrition  Orders Placed This Encounter   Procedures   • Diet Order     Standing Status:   Standing     Number of Occurrences:   1     Order Specific Question:   Diet:     Answer:   Cardiac [6]     Physical Exam   Constitutional: He is oriented to person, place, and time. He appears well-developed.  Non-toxic appearance. No distress.   HENT:   Head: Normocephalic and atraumatic.   Mouth/Throat: Oropharynx is clear and moist. No oropharyngeal exudate.   Eyes: Conjunctivae are normal. Pupils are equal, round, and reactive to light. Right eye exhibits no discharge. Left eye exhibits no discharge. No scleral icterus.   Neck: Normal range of motion. No JVD present. No edema and no erythema present.   Cardiovascular: Normal rate and regular rhythm.  Exam reveals no gallop and no friction rub.    Murmur heard.  Pulmonary/Chest: Effort normal. No stridor. No respiratory distress. He has no wheezes. He has rales. He exhibits no tenderness.   Abdominal: Soft. Bowel sounds are normal. He exhibits no distension. There is no tenderness. There is no rebound.   Musculoskeletal: Normal range of motion. He exhibits edema (Minimal). He exhibits no tenderness or deformity.   Neurological: He is alert and oriented to person, place, and time. He has normal reflexes. No cranial nerve deficit.   Skin: Skin is warm and dry.  No rash noted. He is not diaphoretic. No erythema.   Psychiatric: He has a normal mood and affect. Judgment and thought content normal. He is slowed. Cognition and memory are impaired.   Anxious   Nursing note and vitals reviewed.      Recent Labs      05/02/18   1225  05/03/18   0359  05/04/18   0357   WBC  12.3*  7.2  11.8*   RBC  3.87*  3.48*  3.37*   HEMOGLOBIN  11.9*  10.6*  10.3*   HEMATOCRIT  35.5*  33.8*  32.6*   MCV  91.7  97.1  96.7   MCH  30.7  30.5  30.6   MCHC  33.5*  31.4*  31.6*   RDW  48.5  51.8*  51.5*   PLATELETCT  231  225  244   MPV  11.7  11.3  10.9     Recent Labs      05/02/18   1225  05/03/18   0359  05/04/18   0357   SODIUM  139  139  138   POTASSIUM  4.7  3.9  4.7   CHLORIDE  109  110  110   CO2  19*  18*  19*   GLUCOSE  153*  125*  180*   BUN  39*  44*  42*   CREATININE  1.26  1.15  1.12   CALCIUM  9.1  8.8  8.5     Recent Labs      05/03/18   0359   INR  1.23*     Recent Labs      05/04/18   0357   BNPBTYPENAT  1192*              Assessment/Plan     * NSTEMI (non-ST elevated myocardial infarction) (HCC)- (present on admission)   Assessment & Plan    Noted outside facility, transferred here for follow-up care, now status post cardiac cath with stenting  Continue DAPT, BB, ACE-I, High intensity statin therapy  Further recommendations per cardiology team   Close outpatient cardiology follow up        Acute hypoxemic respiratory failure (HCC)- (present on admission)   Assessment & Plan    Persistent   This is 2/2 acute cardiogenic pulmonary edema POA  Continue PO Lasix  Unable to tolerate aggressive diuresis because of hypotension  Abx management  Wean off O2 as able  Persistent O2 needs  DVT duplex obtaiend and negative. Unlikely PE        Leukocytosis- (present on admission)   Assessment & Plan    Etiology uncertain  Underlying pneumonia cannot be ruled out  Improved with IV ceftriaxone x 1  UA is negative  Encephalopathy improved  Given advanced age, belief empiric abx therapy in the  setting of elevated ESR, CRP, Procalcitonin and encephalopathy is reasonable   De escalated to PO Augmentin / Doxycycline   Aim total 7 days of therapy        Acute systolic (congestive) heart failure (HCC)- (present on admission)   Assessment & Plan    Continue Lasix / Aldactone  BB / ACE-I  Cardiology team on board  Further recommendations per cardiology team  Palliative consultation for advance care planning obtained  Close outpatient cardiology follow up        Severe aortic stenosis- (present on admission)   Assessment & Plan    Further recommendations per cardiology team   Plan for outpatient TAVR evaluation         Encephalopathy- (present on admission)   Assessment & Plan    Resolved  2/2 Hospital / ICU delirium in the setting of underlying parkinson disease  Continue Seroquel 100 mg HS while in the hospital  Avoid sedative, narcotics / BZDs as able  Aspiration / Fall precautions  Empiric abx (PNA / Infectious concerns cannot be ruled out)  Close clinical monitoring         Anemia- (present on admission)   Assessment & Plan    Likely AOCD  Iron / MV supplementation   Monitor Hb / Restrictive transfusion strategy  Close monitoring per PCP in outpatient setting        Wheezing- (present on admission)   Assessment & Plan    Resolved  BD therapy  RT protocol  Continue to monitor        Cognitive decline- (present on admission)   Assessment & Plan    In the setting of parkinson disease  Back to baseline  Appreciate SLP input        Parkinson's disease (HCC)- (present on admission)   Assessment & Plan    Continue Sinemet and selegiline, home meds  Ambulating well within hospital wards  SOB limiting ambulation  Outpatient neurology evaluation         Seizure disorder (HCC)- (present on admission)   Assessment & Plan    No new seizures noted, continue Keppra and gabapentin at home doses  Encephalopathy, EEG negative  Encephalopathy has resolved now          Quality-Core Measures   Reviewed items::  Labs reviewed,  Medications reviewed and Radiology images reviewed  Thomas catheter::  No Thomas  DVT prophylaxis - mechanical:  SCDs  Antibiotics:  Treating active infection/contamination beyond 24 hours perioperative coverage  Assessed for rehabilitation services:  Patient was assess for and/or received rehabilitation services during this hospitalization

## 2018-05-05 NOTE — DISCHARGE PLANNING
Per Dr. Benavidez consult:    Recommendations:  · Per documentation the patient has unlikely to have a home safe discharge, unlikely to progress and be able to tolerate 3 hours of intense therapy per day at inpatient rehabilitation.  · Recommend skilled nursing facility discharge with physical therapy and Occupational Therapy  · Consider outpatient cardiac rehabilitation program following SNF discharge

## 2018-05-05 NOTE — CARE PLAN
Problem: Communication  Goal: The ability to communicate needs accurately and effectively will improve    Intervention: Educate patient and significant other/support system about the plan of care, procedures, treatments, medications and allow for questions  White board updated with POC and care team information during bedside report.      Problem: Safety  Goal: Will remain free from falls  Fall precautions in place. Bed in lowest position. Non-skid socks in place. Personal possessions within reach. Mobility sign on door. Bed-alarm on. Call light within reach. Pt educated regarding fall prevention and states understanding.

## 2018-05-05 NOTE — DISCHARGE PLANNING
PMR order received from Dr. Cowan.  MCR/TRI is shown for his medical provider.  DX: NSTEMI requiring a PTCA/RY oLCX 5/1/18.  PLOF: Ind with ADL's-utilizing a SPC.  Lives alone. Dr. Benavidez to consult.  I do appreciate the referral.

## 2018-05-05 NOTE — PROGRESS NOTES
Bedside report received. Patient A&O x4. 4L NC.  ST on the monitor. No complaints of pain at this time. POC discussed with patient. Patient verbalized understanding. Call light and belongings within reach. Bed locked and in lowest position, alarm and fall precautions in place.

## 2018-05-05 NOTE — PROGRESS NOTES
Cardiology Progress Note               Author: Mercedes Veliz Date & Time created: 2018  8:41 AM     Cardiology consultation for non-STEMI ( trop peaked at 6.5 )      Presented with , transferred from [place] for chest pain and troponin elevation, peaked  6.5      History of coronary artery disease, parkinson disease, testicular cancer, hypertension, seizure disorder, cervical fusion posterior     s/p coronary angiography 18 s/p PTCA/PCI/RY ostial left circumflex (patent stent of ostial left main into p LAD, EF 20%, elevated LVEDP, elevated right heart pressure, probable low-flow low-grade and severe AS, calculated CRUZITO 0.60      BP 87/59  HR 60's NSR    Labs reviewed   Na, K, CR stable   BNP 1100 18      Limited echo 18, LVEF 15-20%, severe aortic stenosis    Review of Systems   Respiratory: Negative for cough and shortness of breath.    Cardiovascular: Negative for chest pain, palpitations, orthopnea, claudication, leg swelling and PND.   Neurological: Positive for dizziness.       Physical Exam   Constitutional: He is oriented to person, place, and time.   HENT:   Head: Normocephalic.   Eyes: Conjunctivae are normal.   Neck: No JVD present. No thyromegaly present.   Cardiovascular: Normal rate and regular rhythm.    Abdominal: Soft.   Musculoskeletal: He exhibits no edema.   Neurological: He is alert and oriented to person, place, and time.   Skin: Skin is warm and dry.       Hemodynamics:  Temp (24hrs), Av.7 °C (98 °F), Min:36.4 °C (97.5 °F), Max:37.1 °C (98.7 °F)  Temperature: 36.4 °C (97.5 °F)  Pulse  Av.7  Min: 59  Max: 122  Blood Pressure : 103/64     Respiratory:    Respiration: 16, Pulse Oximetry: 99 %     Work Of Breathing / Effort: Moderate;Shallow;Tachypnea  RUL Breath Sounds: Clear, RML Breath Sounds: Clear, RLL Breath Sounds: Diminished, NOA Breath Sounds: Clear, LLL Breath Sounds: Diminished  Fluids:     Weight: 99.8 kg (220 lb 0.3 oz)  GI/Nutrition:  Orders Placed This  Encounter   Procedures   • Diet Order     Standing Status:   Standing     Number of Occurrences:   1     Order Specific Question:   Diet:     Answer:   Cardiac [6]     Lab Results:  Recent Labs      05/03/18 0359 05/04/18 0357 05/05/18   0336   WBC  7.2  11.8*  7.6   RBC  3.48*  3.37*  3.44*   HEMOGLOBIN  10.6*  10.3*  10.6*   HEMATOCRIT  33.8*  32.6*  33.0*   MCV  97.1  96.7  95.9   MCH  30.5  30.6  30.8   MCHC  31.4*  31.6*  32.1*   RDW  51.8*  51.5*  51.6*   PLATELETCT  225  244  235   MPV  11.3  10.9  10.8     Recent Labs      05/03/18 0359 05/04/18 0357 05/05/18   0336   SODIUM  139  138  139   POTASSIUM  3.9  4.7  4.1   CHLORIDE  110  110  110   CO2  18*  19*  19*   GLUCOSE  125*  180*  132*   BUN  44*  42*  39*   CREATININE  1.15  1.12  1.11   CALCIUM  8.8  8.5  8.6     Recent Labs      05/03/18   0359   INR  1.23*     Recent Labs      05/04/18   0357   BNPBTYPENAT  1192*     Recent Labs      05/04/18   0357   BNPBTYPENAT  1192*             Medical Decision Making, by Problem:  Active Hospital Problems    Diagnosis   • *NSTEMI (non-ST elevated myocardial infarction) ([facility]) [I21.4]   • Encephalopathy [G93.40]   • Severe aortic stenosis [I35.0]   • Acute systolic (congestive) heart failure ([facility]) [I50.21]   • Leukocytosis [D72.829]   • Wheezing [R06.2]   • Anemia [D64.9]   • Cognitive decline [R41.89]   • Seizure disorder ([facility]) [G40.909]   • Parkinson's disease ([facility]) [G20]       Assessment/Plan:    Non-STEMI  - s/p PTCA/RY oLCX 5/1/18   -Continue with ASA 81, Brilinta 90 mg BID, Lipitor 40 mg, low dose BB    New cardiomyopathy,combined valvular and  ischemic  - EF 20%,   -  Spironolactone 12.5  mg, lisinopril 2.5 milligrams  - SBP 80's   -reduced the dosage of HF further in the setting of AS      New systolic heart failure  -Valvular  -BNP 1100  - furosemide 20 PO daily on DC  -I/O not recorded adequately        Likely low flow low gradient severe AS  - plan for DST as out patient , watch BP    NO  rhythm issues overnight , ectopies noted     Discharge planing     Will arrange for follow-up appointment with our cardiology office in 7-10 days      Standing weight prior to discharge    Cardiology will follow up as an out patient           Quality-Core Measures   Reviewed items::  Medications reviewed and Labs reviewed

## 2018-05-05 NOTE — CARE PLAN
Problem: Safety  Goal: Will remain free from injury  Outcome: PROGRESSING AS EXPECTED  Bed locked and in lowest position. Bed alarm on. Treaded socks provided. Call light and belongings within reach.  Patient educated to call for assistance. Pt verbalized understanding. Hourly rounding in place.      Problem: Respiratory:  Goal: Respiratory status will improve  Outcome: PROGRESSING AS EXPECTED  Pt on 1.5L NC.  Goal is to titrate pt down to RA.

## 2018-05-06 ENCOUNTER — APPOINTMENT (OUTPATIENT)
Dept: RADIOLOGY | Facility: MEDICAL CENTER | Age: 73
DRG: 246 | End: 2018-05-06
Attending: INTERNAL MEDICINE
Payer: MEDICARE

## 2018-05-06 PROBLEM — Z51.5 COMFORT MEASURES ONLY STATUS: Status: ACTIVE | Noted: 2018-05-06

## 2018-05-06 PROBLEM — J90 PLEURAL EFFUSION: Status: ACTIVE | Noted: 2018-05-06

## 2018-05-06 LAB
ANION GAP SERPL CALC-SCNC: 9 MMOL/L (ref 0–11.9)
BNP SERPL-MCNC: 1417 PG/ML (ref 0–100)
BUN SERPL-MCNC: 35 MG/DL (ref 8–22)
CALCIUM SERPL-MCNC: 8.9 MG/DL (ref 8.5–10.5)
CHLORIDE SERPL-SCNC: 108 MMOL/L (ref 96–112)
CO2 SERPL-SCNC: 21 MMOL/L (ref 20–33)
CREAT SERPL-MCNC: 1.07 MG/DL (ref 0.5–1.4)
CRP SERPL HS-MCNC: 23.08 MG/DL (ref 0–0.75)
EKG IMPRESSION: NORMAL
ERYTHROCYTE [DISTWIDTH] IN BLOOD BY AUTOMATED COUNT: 51.4 FL (ref 35.9–50)
ERYTHROCYTE [SEDIMENTATION RATE] IN BLOOD BY WESTERGREN METHOD: 93 MM/HOUR (ref 0–20)
GLUCOSE SERPL-MCNC: 134 MG/DL (ref 65–99)
HCT VFR BLD AUTO: 34.3 % (ref 42–52)
HGB BLD-MCNC: 11.2 G/DL (ref 14–18)
LV EJECT FRACT  99904: 30
MCH RBC QN AUTO: 30.9 PG (ref 27–33)
MCHC RBC AUTO-ENTMCNC: 32.7 G/DL (ref 33.7–35.3)
MCV RBC AUTO: 94.8 FL (ref 81.4–97.8)
PLATELET # BLD AUTO: 297 K/UL (ref 164–446)
PMV BLD AUTO: 10.8 FL (ref 9–12.9)
POTASSIUM SERPL-SCNC: 4.1 MMOL/L (ref 3.6–5.5)
RBC # BLD AUTO: 3.62 M/UL (ref 4.7–6.1)
SODIUM SERPL-SCNC: 138 MMOL/L (ref 135–145)
TROPONIN I SERPL-MCNC: 4.44 NG/ML (ref 0–0.04)
TROPONIN I SERPL-MCNC: 4.79 NG/ML (ref 0–0.04)
TROPONIN I SERPL-MCNC: 5.47 NG/ML (ref 0–0.04)
WBC # BLD AUTO: 8.2 K/UL (ref 4.8–10.8)

## 2018-05-06 PROCEDURE — 700102 HCHG RX REV CODE 250 W/ 637 OVERRIDE(OP): Performed by: INTERNAL MEDICINE

## 2018-05-06 PROCEDURE — 700111 HCHG RX REV CODE 636 W/ 250 OVERRIDE (IP): Performed by: INTERNAL MEDICINE

## 2018-05-06 PROCEDURE — A9270 NON-COVERED ITEM OR SERVICE: HCPCS | Performed by: INTERNAL MEDICINE

## 2018-05-06 PROCEDURE — 93325 DOPPLER ECHO COLOR FLOW MAPG: CPT

## 2018-05-06 PROCEDURE — 700112 HCHG RX REV CODE 229: Performed by: INTERNAL MEDICINE

## 2018-05-06 PROCEDURE — 85027 COMPLETE CBC AUTOMATED: CPT

## 2018-05-06 PROCEDURE — 36415 COLL VENOUS BLD VENIPUNCTURE: CPT

## 2018-05-06 PROCEDURE — 71275 CT ANGIOGRAPHY CHEST: CPT

## 2018-05-06 PROCEDURE — 80048 BASIC METABOLIC PNL TOTAL CA: CPT

## 2018-05-06 PROCEDURE — 86140 C-REACTIVE PROTEIN: CPT

## 2018-05-06 PROCEDURE — 99291 CRITICAL CARE FIRST HOUR: CPT | Performed by: INTERNAL MEDICINE

## 2018-05-06 PROCEDURE — 93308 TTE F-UP OR LMTD: CPT

## 2018-05-06 PROCEDURE — 93321 DOPPLER ECHO F-UP/LMTD STD: CPT

## 2018-05-06 PROCEDURE — 700102 HCHG RX REV CODE 250 W/ 637 OVERRIDE(OP): Performed by: NURSE PRACTITIONER

## 2018-05-06 PROCEDURE — 93306 TTE W/DOPPLER COMPLETE: CPT | Mod: 26 | Performed by: INTERNAL MEDICINE

## 2018-05-06 PROCEDURE — 71045 X-RAY EXAM CHEST 1 VIEW: CPT

## 2018-05-06 PROCEDURE — 770001 HCHG ROOM/CARE - MED/SURG/GYN PRIV*

## 2018-05-06 PROCEDURE — 83880 ASSAY OF NATRIURETIC PEPTIDE: CPT

## 2018-05-06 PROCEDURE — 700117 HCHG RX CONTRAST REV CODE 255: Performed by: INTERNAL MEDICINE

## 2018-05-06 PROCEDURE — 84484 ASSAY OF TROPONIN QUANT: CPT | Mod: 91

## 2018-05-06 PROCEDURE — A9270 NON-COVERED ITEM OR SERVICE: HCPCS | Performed by: NURSE PRACTITIONER

## 2018-05-06 PROCEDURE — 93010 ELECTROCARDIOGRAM REPORT: CPT | Mod: 76 | Performed by: INTERNAL MEDICINE

## 2018-05-06 PROCEDURE — 93005 ELECTROCARDIOGRAM TRACING: CPT | Performed by: INTERNAL MEDICINE

## 2018-05-06 PROCEDURE — 85652 RBC SED RATE AUTOMATED: CPT

## 2018-05-06 RX ORDER — FAMOTIDINE 20 MG/1
20 TABLET, FILM COATED ORAL 2 TIMES DAILY
Status: DISCONTINUED | OUTPATIENT
Start: 2018-05-06 | End: 2018-05-16 | Stop reason: HOSPADM

## 2018-05-06 RX ORDER — OXYMETAZOLINE HYDROCHLORIDE 0.05 G/100ML
2 SPRAY NASAL 2 TIMES DAILY
Status: DISCONTINUED | OUTPATIENT
Start: 2018-05-06 | End: 2018-05-16 | Stop reason: HOSPADM

## 2018-05-06 RX ORDER — ECHINACEA PURPUREA EXTRACT 125 MG
2 TABLET ORAL 3 TIMES DAILY
Status: DISCONTINUED | OUTPATIENT
Start: 2018-05-06 | End: 2018-05-16 | Stop reason: HOSPADM

## 2018-05-06 RX ORDER — KETOROLAC TROMETHAMINE 30 MG/ML
15 INJECTION, SOLUTION INTRAMUSCULAR; INTRAVENOUS EVERY 6 HOURS
Status: DISCONTINUED | OUTPATIENT
Start: 2018-05-06 | End: 2018-05-09

## 2018-05-06 RX ORDER — MORPHINE SULFATE 4 MG/ML
INJECTION, SOLUTION INTRAMUSCULAR; INTRAVENOUS
Status: DISCONTINUED
Start: 2018-05-06 | End: 2018-05-06

## 2018-05-06 RX ORDER — MORPHINE SULFATE 4 MG/ML
2 INJECTION, SOLUTION INTRAMUSCULAR; INTRAVENOUS EVERY 4 HOURS
Status: DISCONTINUED | OUTPATIENT
Start: 2018-05-06 | End: 2018-05-07

## 2018-05-06 RX ORDER — MORPHINE SULFATE 4 MG/ML
2 INJECTION, SOLUTION INTRAMUSCULAR; INTRAVENOUS ONCE
Status: COMPLETED | OUTPATIENT
Start: 2018-05-06 | End: 2018-05-06

## 2018-05-06 RX ORDER — ALPRAZOLAM 0.5 MG/1
1 TABLET ORAL EVERY 4 HOURS PRN
Status: DISCONTINUED | OUTPATIENT
Start: 2018-05-06 | End: 2018-05-16 | Stop reason: HOSPADM

## 2018-05-06 RX ORDER — ALPRAZOLAM 0.25 MG/1
0.25 TABLET ORAL ONCE
Status: COMPLETED | OUTPATIENT
Start: 2018-05-06 | End: 2018-05-06

## 2018-05-06 RX ADMIN — LIDOCAINE HYDROCHLORIDE 30 ML: 20 SOLUTION OROPHARYNGEAL at 09:00

## 2018-05-06 RX ADMIN — KETOROLAC TROMETHAMINE 15 MG: 30 INJECTION, SOLUTION INTRAMUSCULAR at 14:42

## 2018-05-06 RX ADMIN — MORPHINE SULFATE 2 MG: 4 INJECTION INTRAVENOUS at 17:42

## 2018-05-06 RX ADMIN — ALPRAZOLAM 1 MG: 0.5 TABLET ORAL at 20:03

## 2018-05-06 RX ADMIN — IOHEXOL 75 ML: 350 INJECTION, SOLUTION INTRAVENOUS at 08:39

## 2018-05-06 RX ADMIN — POTASSIUM CHLORIDE 20 MEQ: 1500 TABLET, EXTENDED RELEASE ORAL at 06:11

## 2018-05-06 RX ADMIN — TICAGRELOR 90 MG: 90 TABLET ORAL at 05:38

## 2018-05-06 RX ADMIN — OXYCODONE HYDROCHLORIDE AND ACETAMINOPHEN 500 MG: 500 TABLET ORAL at 12:20

## 2018-05-06 RX ADMIN — GABAPENTIN 300 MG: 300 CAPSULE ORAL at 17:40

## 2018-05-06 RX ADMIN — FERROUS GLUCONATE 324 MG: 324 TABLET ORAL at 17:40

## 2018-05-06 RX ADMIN — QUETIAPINE FUMARATE 100 MG: 25 TABLET ORAL at 19:57

## 2018-05-06 RX ADMIN — SELEGILINE HYDROCHLORIDE 5 MG: 5 TABLET ORAL at 17:41

## 2018-05-06 RX ADMIN — CARBIDOPA AND LEVODOPA 1.5 TABLET: 25; 100 TABLET ORAL at 13:23

## 2018-05-06 RX ADMIN — DOCUSATE CALCIUM 240 MG: 240 CAPSULE, LIQUID FILLED ORAL at 17:42

## 2018-05-06 RX ADMIN — ATORVASTATIN CALCIUM 40 MG: 40 TABLET, FILM COATED ORAL at 17:39

## 2018-05-06 RX ADMIN — DOXYCYCLINE 100 MG: 100 TABLET ORAL at 05:48

## 2018-05-06 RX ADMIN — SPIRONOLACTONE 12.5 MG: 25 TABLET ORAL at 05:45

## 2018-05-06 RX ADMIN — FAMOTIDINE 20 MG: 20 TABLET ORAL at 17:39

## 2018-05-06 RX ADMIN — THERA TABS 1 TABLET: TAB at 05:37

## 2018-05-06 RX ADMIN — OXYCODONE HYDROCHLORIDE AND ACETAMINOPHEN 500 MG: 500 TABLET ORAL at 05:48

## 2018-05-06 RX ADMIN — GABAPENTIN 300 MG: 300 CAPSULE ORAL at 12:20

## 2018-05-06 RX ADMIN — ALPRAZOLAM 0.25 MG: 0.25 TABLET ORAL at 07:39

## 2018-05-06 RX ADMIN — LIDOCAINE HYDROCHLORIDE 15 ML: 20 SOLUTION OROPHARYNGEAL at 20:05

## 2018-05-06 RX ADMIN — TICAGRELOR 90 MG: 90 TABLET ORAL at 17:41

## 2018-05-06 RX ADMIN — ASPIRIN 81 MG: 81 TABLET, COATED ORAL at 05:45

## 2018-05-06 RX ADMIN — Medication 500 MCG: at 05:41

## 2018-05-06 RX ADMIN — OXYCODONE HYDROCHLORIDE AND ACETAMINOPHEN 500 MG: 500 TABLET ORAL at 17:40

## 2018-05-06 RX ADMIN — NITROGLYCERIN 0.4 MG: 0.4 TABLET SUBLINGUAL at 07:15

## 2018-05-06 RX ADMIN — LEVETIRACETAM 1500 MG: 500 TABLET, FILM COATED ORAL at 17:39

## 2018-05-06 RX ADMIN — DOXYCYCLINE 100 MG: 100 TABLET ORAL at 17:39

## 2018-05-06 RX ADMIN — MORPHINE SULFATE 2 MG: 4 INJECTION INTRAVENOUS at 13:27

## 2018-05-06 RX ADMIN — GABAPENTIN 300 MG: 300 CAPSULE ORAL at 05:41

## 2018-05-06 RX ADMIN — AMOXICILLIN AND CLAVULANATE POTASSIUM 1 TABLET: 875; 125 TABLET, FILM COATED ORAL at 06:11

## 2018-05-06 RX ADMIN — KETOROLAC TROMETHAMINE 15 MG: 30 INJECTION, SOLUTION INTRAMUSCULAR at 17:41

## 2018-05-06 RX ADMIN — AMOXICILLIN AND CLAVULANATE POTASSIUM 1 TABLET: 875; 125 TABLET, FILM COATED ORAL at 17:40

## 2018-05-06 RX ADMIN — FUROSEMIDE 40 MG: 40 TABLET ORAL at 05:38

## 2018-05-06 RX ADMIN — CARBIDOPA AND LEVODOPA 1.5 TABLET: 25; 100 TABLET ORAL at 17:41

## 2018-05-06 RX ADMIN — CITALOPRAM HYDROBROMIDE 20 MG: 20 TABLET ORAL at 05:41

## 2018-05-06 RX ADMIN — LEVETIRACETAM 1500 MG: 500 TABLET, FILM COATED ORAL at 05:38

## 2018-05-06 RX ADMIN — NITROGLYCERIN 0.4 MG: 0.4 TABLET SUBLINGUAL at 07:00

## 2018-05-06 RX ADMIN — METOPROLOL SUCCINATE 12.5 MG: 25 TABLET, EXTENDED RELEASE ORAL at 17:39

## 2018-05-06 RX ADMIN — CARBIDOPA AND LEVODOPA 1.5 TABLET: 25; 100 TABLET ORAL at 10:00

## 2018-05-06 RX ADMIN — SELEGILINE HYDROCHLORIDE 5 MG: 5 TABLET ORAL at 06:12

## 2018-05-06 RX ADMIN — LISINOPRIL 2.5 MG: 5 TABLET ORAL at 05:48

## 2018-05-06 RX ADMIN — FERROUS GLUCONATE 324 MG: 324 TABLET ORAL at 05:45

## 2018-05-06 RX ADMIN — CARBIDOPA AND LEVODOPA 1.5 TABLET: 25; 100 TABLET ORAL at 19:58

## 2018-05-06 ASSESSMENT — ENCOUNTER SYMPTOMS
FALLS: 0
PALPITATIONS: 1
CLAUDICATION: 0
COUGH: 0
WEAKNESS: 0
NAUSEA: 0
SPUTUM PRODUCTION: 0
FEVER: 0
DIARRHEA: 0
BLURRED VISION: 0
CONSTIPATION: 0
MYALGIAS: 0
LOSS OF CONSCIOUSNESS: 0
WEIGHT LOSS: 0
DIZZINESS: 0
HEMOPTYSIS: 0
WHEEZING: 0
TREMORS: 1
SPEECH CHANGE: 0
ORTHOPNEA: 0
NERVOUS/ANXIOUS: 1
SENSORY CHANGE: 0
VOMITING: 0
SEIZURES: 0
FLANK PAIN: 0
DOUBLE VISION: 0
TINGLING: 0
NECK PAIN: 0
HEADACHES: 0
HEARTBURN: 0
BACK PAIN: 0
DEPRESSION: 0
FOCAL WEAKNESS: 0
CHILLS: 0
BLOOD IN STOOL: 0
SHORTNESS OF BREATH: 1
PND: 0
ABDOMINAL PAIN: 0
DIAPHORESIS: 0

## 2018-05-06 ASSESSMENT — PAIN SCALES - GENERAL
PAINLEVEL_OUTOF10: 0
PAINLEVEL_OUTOF10: 5
PAINLEVEL_OUTOF10: 1
PAINLEVEL_OUTOF10: 6
PAINLEVEL_OUTOF10: 8
PAINLEVEL_OUTOF10: 0
PAINLEVEL_OUTOF10: 0
PAINLEVEL_OUTOF10: 6

## 2018-05-06 NOTE — ASSESSMENT & PLAN NOTE
B/L   CHF related  Patient is relatively asymptomatic  Minimal O2 requirements now  High risk thoracentesis with DAPT / Aortic stenosis  Poor tolerance of aggressive Diuresis  Patient wants to proceed with comfort measures at this time  Continue PO Lasix / HF regimen  Continue clinical monitoring and optimize clinically as able

## 2018-05-06 NOTE — PROGRESS NOTES
[facility] Cardiology Follow-up Consult Note  Date of note:    5/6/2018      Consulting Physician: Tessa Cowan M.D.    Patient ID:  Name:   Prince Pham   YOB: 1945  Age:   72 y.o.  male   MRN:   [id number]    Chief Complaint   Patient presents with   • MI (Non ST Segment Elevation MI)     PT TRANSFER FROM [facility] FOR CP AND TROPONIN OF 3.54 WITH ST DEPRESSION, RECEIVED ASPIRIN, MORPHINE AND NTG PTA.       Interim Events:  This a.m. the patient is complaining of significant increase in chest discomfort shortness of breath agitation which is different than the previously observed anxiety attacks his troponin was slightly increased from prior      ROS  No NV, No Bleeding, No dizziness   All other review of systems reviewed and negative.    Past medical, surgical, social, and family history reviewed and unchanged from admission except as noted in assessment and plan.    Medications: Reviewed in MAR    Current Facility-Administered Medications:   •  morphine (pf) 4 mg/ml injection, , , ,   •  ketorolac (TORADOL) injection 15 mg, 15 mg, Intravenous, Q6HRS, Tessa Cowan M.D., 15 mg at 05/06/18 1442  •  famotidine (PEPCID) tablet 20 mg, 20 mg, Oral, BID, Tessa Cowan M.D.  •  metoprolol SR (TOPROL XL) tablet 12.5 mg, 12.5 mg, Oral, Q EVENING, Mercedes Veliz, A.P.N., 12.5 mg at 05/05/18 1851  •  spironolactone (ALDACTONE) tablet 12.5 mg, 12.5 mg, Oral, Q DAY, Mercedes Veliz, A.P.N., 12.5 mg at 05/06/18 0545  •  ipratropium-albuterol (DUONEB) nebulizer solution, 3 mL, Nebulization, Q4H PRN (RT), Tessa Cowan M.D.  •  furosemide (LASIX) tablet 40 mg, 40 mg, Oral, Q DAY, Perry Patel M.D., 40 mg at 05/06/18 0538  •  sodium chloride (OCEAN) 0.65 % nasal spray 2 Spray, 2 Spray, Nasal, Q2HRS PRN, Tessa Cowan M.D., 2 [place] at 05/05/18 1604  •  lisinopril (PRINIVIL) tablet 2.5 mg, 2.5 mg, Oral, Q DAY, Mercedes Veliz AWayneP.N., 2.5 mg at 05/06/18 0560  •  amoxicillin-clavulanate (AUGMENTIN)  875-125 MG per tablet 1 Tab, 1 Tab, Oral, Q12HRS, Tessa Cowan M.D., 1 Tab at 05/06/18 0611  •  doxycycline monohydrate (ADOXA) tablet 100 mg, 100 mg, Oral, Q12HRS, Tessa Cowan M.D., 100 mg at 05/06/18 0548  •  potassium chloride SA (Kdur) tablet 20 mEq, 20 mEq, Oral, DAILY, Tessa Cowan M.D., 20 mEq at 05/06/18 0611  •  ascorbic acid tablet 500 mg, 500 mg, Oral, TID, Tessa Cowan M.D., 500 mg at 05/06/18 1220  •  ferrous gluconate (FERGON) tablet 324 mg, 324 mg, Oral, BID WITH MEALS, Tessa Cowan M.D., 324 mg at 05/06/18 0545  •  multivitamin (THERAGRAN) tablet 1 Tab, 1 Tab, Oral, DAILY, Tessa Cowan M.D., 1 Tab at 05/06/18 0537  •  QUEtiapine (SEROQUEL) tablet 100 mg, 100 mg, Oral, Q EVENING, Tessa Cowan M.D., 100 mg at 05/05/18 2041  •  aspirin EC (ECOTRIN) tablet 81 mg, 81 mg, Oral, DAILY, Evert Cantu M.D., 81 mg at 05/06/18 0545  •  ticagrelor (BRILINTA) tablet 90 mg, 90 mg, Oral, BID, Perry Patel M.D., 90 mg at 05/06/18 0538  •  carbidopa-levodopa (SINEMET)  MG tablet 1.5 Tab, 1.5 Tab, Oral, 4X/DAY, Tima Carter M.D., 1.5 Tab at 05/06/18 1323  •  citalopram (CELEXA) tablet 20 mg, 20 mg, Oral, DAILY, Tima Carter M.D., 20 mg at 05/06/18 0541  •  cyanocobalamin (VITAMIN B-12) tablet 500 mcg, 500 mcg, Oral, DAILY, Tima Carter M.D., 500 mcg at 05/06/18 0541  •  docusate calcium (SURFAK) capsule 240 mg, 240 mg, Oral, BID, Tima Carter M.D., Stopped at 05/06/18 0600  •  gabapentin (NEURONTIN) capsule 300 mg, 300 mg, Oral, TID, Tima Carter M.D., 300 mg at 05/06/18 1220  •  levETIRAcetam (KEPPRA) tablet 1,500 mg, 1,500 mg, Oral, BID, Tima Carter M.D., 1,500 mg at 05/06/18 0538  •  meclizine (ANTIVERT) tablet 12.5 mg, 12.5 mg, Oral, TID PRN, Tima Carter M.D.  •  selegiline (ELDEPRYL) tablet 5 mg, 5 mg, Oral, BID, Tima Carter M.D., 5 mg at 05/06/18 0612  •  Respiratory Care per Protocol, , Nebulization, Continuous RT, Tima Carter M.D.  •   "acetaminophen (TYLENOL) tablet 650 mg, 650 mg, Oral, Q6HRS PRN, Tima Carter M.D., 650 mg at 05/04/18 0207  •  atorvastatin (LIPITOR) tablet 40 mg, 40 mg, Oral, Q EVENING, Tima Carter M.D., 40 mg at 05/05/18 1840  Allergies   Allergen Reactions   • Nkda [No Known Drug Allergy]        Physical Exam  Body mass index is 29.96 kg/m². Blood pressure 104/55, pulse (!) 113, temperature 36.7 °C (98 °F), resp. rate 20, height 1.778 m (5' 10\"), weight 94.7 kg (208 lb 12.4 oz), SpO2 95 %. Vitals:    05/06/18 0726 05/06/18 0738 05/06/18 0750 05/06/18 1200   BP: 114/98 101/66 106/60 104/55   Pulse: 98 (!) 104 (!) 106 (!) 113   Resp: (!) 22 (!) 22 (!) 22 20   Temp:    36.7 °C (98 °F)   SpO2: 94% 100% 100% 95%   Weight:       Height:        Oxygen Therapy:  Pulse Oximetry: 95 %, O2 (LPM): 2, O2 Delivery: Silicone Nasal Cannula    General: Well appearing and in no apparent distress  Eyes: nl conjunctiva  ENT: OP clear  Neck: no JVD   Lungs: normal respiratory effort, CTAB  Heart: RRR, systolic ejection murmur soft heart sounds otherwise  EXT no edema bilateral lower extremities. + pedal pulses. no cyanosis  Abdomen: soft, non tender, non distended,  Neurological: No focal sensory deficits  Psychiatric: Appropriate affect, A/O x 3  Skin: Warm extremities    Labs (personally reviewed and notable for):   Recent Results (from the past 24 hour(s))   CBC WITHOUT DIFFERENTIAL    Collection Time: 05/06/18  3:50 AM   Result Value Ref Range    WBC 8.2 4.8 - 10.8 K/uL    RBC 3.62 (L) 4.70 - 6.10 M/uL    Hemoglobin 11.2 (L) 14.0 - 18.0 g/dL    Hematocrit 34.3 (L) 42.0 - 52.0 %    MCV 94.8 81.4 - 97.8 fL    MCH 30.9 27.0 - 33.0 pg    MCHC 32.7 (L) 33.7 - 35.3 g/dL    RDW 51.4 (H) 35.9 - 50.0 fL    Platelet Count 297 164 - 446 K/uL    MPV 10.8 9.0 - 12.9 fL   BASIC METABOLIC PANEL    Collection Time: 05/06/18  3:50 AM   Result Value Ref Range    Sodium 138 135 - 145 mmol/L    Potassium 4.1 3.6 - 5.5 mmol/L    Chloride 108 96 - 112 " mmol/L    Co2 21 20 - 33 mmol/L    Glucose 134 (H) 65 - 99 mg/dL    Bun 35 (H) 8 - 22 mg/dL    Creatinine 1.07 0.50 - 1.40 mg/dL    Calcium 8.9 8.5 - 10.5 mg/dL    Anion Gap 9.0 0.0 - 11.9   ESTIMATED GFR    Collection Time: 18  3:50 AM   Result Value Ref Range    GFR If African American >60 >60 mL/min/1.73 m 2    GFR If Non African American >60 >60 mL/min/1.73 m 2   EKG    Collection Time: 18  7:05 AM   Result Value Ref Range    Report       [facility]    Test Date:  2018  Pt Name:    YUKI LOERA               Department: 183  MRN:        [id number]                      Room:       T813  Gender:     Male                         Technician: ANTHONY  :        1945                   Requested By:TRACY DUARTE  Order #:    [id number]                    Reading MD: Alexis Cowart MD    Measurements  Intervals                                Axis  Rate:       105                          P:          53  NE:         148                          QRS:        18  QRSD:       93                           T:          19  QT:         400  QTc:        529    Interpretive Statements  SINUS TACHYCARDIA WITH IRREGULAR RATE  BORDERLINE LOW VOLTAGE, EXTREMITY LEADS  PROLONGED QT INTERVAL  Compared to ECG 2018 04:25:29  Sinus rhythm no longer present  ST (T wave) deviation no longer present    Electronically Signed On 2018 12:31:37 PDT by Alexis Cowart MD     TROPONIN    Collection Time: 18  7:09 AM   Result Value Ref Range    Troponin I 4.79 (H) 0.00 - 0.04 ng/mL   BTYPE NATRIURETIC PEPTIDE    Collection Time: 18  7:09 AM   Result Value Ref Range    B Natriuretic Peptide 1417 (H) 0 - 100 pg/mL   EKG    Collection Time: 18  7:28 AM   Result Value Ref Range    Report       [facility]    Test Date:  2018  Pt Name:    YUKI LOERA               Department: 183  MRN:        [id number]                      Room:       T813  Gender:     Male                          Technician: AYDIN  :        1945                   Requested By:TRACY DUARTE  Order #:    [id number]                    Reading MD: Alexis Cowart MD    Measurements  Intervals                                Axis  Rate:       112                          P:          41  OH:         128                          QRS:        20  QRSD:       94                           T:          70  QT:         368  QTc:        503    Interpretive Statements  SINUS TACHYCARDIA  BORDERLINE T ABNORMALITIES, ANT-LAT LEADS  PROLONGED QT INTERVAL  Compared to ECG 2018 07:05:37  T-wave abnormality now present    Electronically Signed On 2018 12:31:42 PDT by Alexis Cowart MD     EKG    Collection Time: 18  7:55 AM   Result Value Ref Range    Report       [facility]    Test Date:  2018  Pt Name:    YUKI LOERA               Department: [facility]  MRN:        [id number]                      Room:       [facility]  Gender:     Male                         Technician: AYDIN  :        1945                   Requested By:TRACY DUARTE  Order #:    [id number]                    Reading MD: Alexis Cowart MD    Measurements  Intervals                                Axis  Rate:       103                          P:          41  OH:         156                          QRS:        17  QRSD:       96                           T:          40  QT:         372  QTc:        487    Interpretive Statements  SINUS TACHYCARDIA  BORDERLINE T ABNORMALITIES, LATERAL LEADS  BORDERLINE PROLONGED QT INTERVAL  Compared to ECG 2018 07:28:38  No significant changes    Electronically Signed On 2018 12:31:46 PDT by Alexis Cowart MD     TROPONIN    Collection Time: 18  7:57 AM   Result Value Ref Range    Troponin I 4.44 (H) 0.00 - 0.04 ng/mL   CRP QUANTITIVE (NON-CARDIAC)    Collection Time: 18  7:57 AM   Result Value Ref Range    Stat C-Reactive Protein 23.08 (H) 0.00 - 0.75 mg/dL   [facility] SED RATE     Collection Time: 18 10:28 AM   Result Value Ref Range    Sed Rate Westergren 93 (H) 0 - 20 mm/hour   ECHOCARDIOGRAM LTD W/ CONT    Collection Time: 18 11:32 AM   Result Value Ref Range    Left Ventrical Ejection Fraction 30    TROPONIN    Collection Time: 18 12:11 PM   Result Value Ref Range    Troponin I 5.47 (H) 0.00 - 0.04 ng/mL   EKG    Collection Time: 18  2:18 PM   Result Value Ref Range    Report       [facility]    Test Date:  2018  Pt Name:    YUKI LOERA               Department: 183  MRN:        [id number]                      Room:       [facility]  Gender:     Male                         Technician: FABRICE  :        1945                   Requested By:TRACY DUARTE  Order #:    [id number]                    Reading MD:    Measurements  Intervals                                Axis  Rate:       106                          P:          47  NC:         148                          QRS:        25  QRSD:       98                           T:          108  QT:         380  QTc:        505    Interpretive Statements  SINUS TACHYCARDIA  BORDERLINE LOW VOLTAGE IN FRONTAL LEADS  NONSPECIFIC REPOL ABNORMALITY, DIFFUSE LEADS  PROLONGED QT INTERVAL  Compared to ECG 2018 07:55:28  Early repolarization now present  T-wave abnormality no longer present         Cardiac Imaging and Procedures Review:    EKG and telemetry tracings personally reviewed sinus rhythm with strain pattern on the lateral leads not as severe as from May 1 but does appear new compared to prior EKGs in the interim    Impression and Medical Decision Making:  Principal Problem:    NSTEMI (non-ST elevated myocardial infarction) (HCC) POA: Yes  Active Problems:    Acute hypoxemic respiratory failure (HCC) POA: Yes    Encephalopathy POA: Yes    Severe aortic stenosis POA: Yes    Acute systolic (congestive) heart failure (HCC) POA: Yes    Leukocytosis POA: Yes    Seizure disorder (HCC) POA: Yes    Parkinson's  disease (HCC) POA: Yes    Cognitive decline POA: Yes    Wheezing POA: Yes    Anemia POA: Yes  Resolved Problems:    * No resolved hospital problems. *    CAD status post left circumflex PCI  2 antiplatelet therapy  He appears to be having ischemia now but not occlusion of his prior stenting based on his troponin and his EKG he is at high risk for clinical deterioration due to his underlying heart failure and probable severe aortic stenosis in the setting of reduced cardiac output.  Because of his clinical course and presentation he is not a candidate for TAVR down the road as I do not suspect that he would have a 1 year survival and we discussed this very frankly at the bedside he would strongly prefer to return to his home in [facility] under hospice care.  Dr. Cowan I did discussed with him the severity of his current presentation and he does understand this fully.    Would transition him to comfort measures but continue his heart regimen but focus on palliation and less blood pressures and other important features that we have.    Future Appointments  Date Time Provider Department Center   5/16/2018 2:00 PM LAMIN Gonzalez [facility] None       It is my pleasure to participate in the care of Mr. Pham.  Please do not hesitate to contact me with questions or concerns.    Perry Patel MD PhD [facility]  Cardiologist [facility]    5/6/2018

## 2018-05-06 NOTE — PROGRESS NOTES
This RN witnessed Dr Cowan speaking to patient and pt stated he wishes to be placed on comfort measures for duration of hospital stay and wishes to be sent home with hospice care.

## 2018-05-06 NOTE — CODE DOCUMENTATION
MD at bedside. MD Cowan aware of current labs, unresolved chest pain with nitro. Pt VSS, 1 L nasal cannula, EKGx3 unremarkable. Per MD pt to have CT for rule out PE.GI cocktail ordered and xanax given. Primary RN updated.

## 2018-05-06 NOTE — PROGRESS NOTES
"[facility] Hospitalist Progress Note    Date of Service: 5/6/2018    Chief Complaint  72 y.o. male admitted 4/30/2018 with chest pain / NSTEMI s/p PCI with hospital course c/b hospital induced delirium in the setting of parkinson's disease. Persistent concerns for ischemia post PCI. Patient transitioned to comfort measures 05/06/18 in line with patient wishes at this time    Interval Problem Update  Patient seen and evaluated on rounds  Nursing reporting worsening pain  EKG obtained initially no significant concerns    PE / Dressler's syndrome / PNA / Pericarditis / Sub endocardial ischemia / AS related angina considered as differentials    CT PE obtained. EKG cycled. Troponin's cycled. Troponin going up.     Subsequent EKG concerning for ischemia in indeed.     Patient remains significantly anxious at this time    Dr Patel again consulted from cardiology team. Case discussed with Dr Jorge Patel recommends hospice care / comfort measures at this time    Dr Patel and I went to bedside to discuss goals of care with patient. Patient reports he wants to go home with hospice. He verbalized understanding and expressed his wishes.     Later as his attending physician, I went back to reassess the patient. He has had significant improvement in his symptoms with toradol. Patient is A&Ox4. Logical and rationale. Able to make his own decisions at this time. I discussed with patient underlying findings of NSTEMI, concern for recurrent ischemia, severe systolic CHF with depressed EF and findings of severe AS in conjunction with other underlying co morbidities. I expressed to patient that his underlying prognosis is bad despite aggressive interventions with high risk of mortality. I discussed with patient if after mine and Dr Patel's discussion he has had time to think. Patient reports \"I have lived a good life, I would like to die with dignity and comfort\". Patient would like to be DNR / DNI / Comfort measures only with " ongoing continuation of current therapy. He reports I do not want to go to SNF. Please get me back to my home. I have my friends there. I discussed with patient that he may not have the option of hospice care available to him locally where he lives. He verbalized understanding of this but reports I will have my friends there to help me.     My above discussions were witnessed by patient's RN Monica Jaramillo.    Patient at this time transitioned to comfort measures.     Start Toradol / Morphine / GI cocktail / Pepcid / Ocean spray / Afrin spray / PRN xanax    Will discuss with SW tomorrow regarding potential plans for getting this patient back to his baseline residential situation in line with his wishes    Off note I have tried to reach the patient's son multiple times during the day to update him regarding above. First time went to . Second time son Bong picked up the phone. I introduced myself and informed the son of his father's critical condition. After this, the line dropped. Subsequently I attempted to reach the son again multiple times but to I remained unsuccessful. I have left a voice mail for the son to reach us back.     Will update patient's son once contact established. Son advised to call telemetry 8 and ask for Dr Cowan on his voicemail or reach patient's nursing staff who will inform me / page me to reach out to patient's son again.     Consultants/Specialty  Cardiology  Palliative care    Disposition  Patient wants to pursue comfort measures  He wants to discharge to his baseline residential situation in [place]  He wants to achieve above with hospice care  Will discuss tomorrow with SW to have above achieved as able  Meanwhile no telemetry needs  Patient can be transferred to oncology RNF        Review of Systems   Constitutional: Negative for chills, diaphoresis, fever, malaise/fatigue and weight loss.   HENT: Negative for hearing loss and tinnitus.    Eyes: Negative for blurred vision and double  vision.   Respiratory: Positive for shortness of breath. Negative for cough, hemoptysis, sputum production and wheezing.    Cardiovascular: Positive for chest pain and palpitations. Negative for orthopnea, claudication, leg swelling and PND.   Gastrointestinal: Negative for abdominal pain, blood in stool, constipation, diarrhea, heartburn, melena, nausea and vomiting.   Genitourinary: Negative for dysuria, flank pain, frequency, hematuria and urgency.   Musculoskeletal: Negative for back pain, falls, joint pain, myalgias and neck pain.   Skin: Negative for itching and rash.   Neurological: Positive for tremors. Negative for dizziness, tingling, sensory change, speech change, focal weakness, seizures, loss of consciousness, weakness and headaches.   Psychiatric/Behavioral: Negative for depression. The patient is nervous/anxious.       Physical Exam  Laboratory/Imaging   Hemodynamics  Temp (24hrs), Av.6 °C (97.9 °F), Min:36.3 °C (97.3 °F), Max:37.1 °C (98.7 °F)   Temperature: 36.7 °C (98 °F)  Pulse  Av.9  Min: 59  Max: 123   Blood Pressure : 104/55      Respiratory      Respiration: 20, Pulse Oximetry: 95 %     Work Of Breathing / Effort: Moderate;Shallow;Tachypnea  RUL Breath Sounds: Clear, RML Breath Sounds: Clear, RLL Breath Sounds: Diminished, NOA Breath Sounds: Clear, LLL Breath Sounds: Diminished    Fluids    Intake/Output Summary (Last 24 hours) at 18 1644  Last data filed at 18 0630   Gross per 24 hour   Intake                0 ml   Output              500 ml   Net             -500 ml       Nutrition  Orders Placed This Encounter   Procedures   • Diet Order     Standing Status:   Standing     Number of Occurrences:   1     Order Specific Question:   Diet:     Answer:   Cardiac [6]     Physical Exam   Constitutional: He is oriented to person, place, and time. He appears well-developed.  Non-toxic appearance. No distress.   HENT:   Head: Normocephalic and atraumatic.   Mouth/Throat:  Oropharynx is clear and moist. No oropharyngeal exudate.   Eyes: Conjunctivae are normal. Pupils are equal, round, and reactive to light. Right eye exhibits no discharge. Left eye exhibits no discharge. No scleral icterus.   Neck: Normal range of motion. No JVD present. No edema and no erythema present.   Cardiovascular: Normal rate and regular rhythm.  Exam reveals no gallop and no friction rub.    Murmur heard.  Pulmonary/Chest: Effort normal. No stridor. No respiratory distress. He has no wheezes. He has rales. He exhibits no tenderness.   Abdominal: Soft. Bowel sounds are normal. He exhibits no distension. There is no tenderness. There is no rebound.   Musculoskeletal: Normal range of motion. He exhibits edema (Minimal). He exhibits no tenderness or deformity.   Neurological: He is alert and oriented to person, place, and time. He has normal reflexes. No cranial nerve deficit.   Skin: Skin is warm and dry. No rash noted. He is not diaphoretic. No erythema.   Psychiatric: He has a normal mood and affect. Judgment and thought content normal. He is slowed. Cognition and memory are impaired.   Anxious   Nursing note and vitals reviewed.      Recent Labs      05/04/18 0357 05/05/18   0336  05/06/18   0350   WBC  11.8*  7.6  8.2   RBC  3.37*  3.44*  3.62*   HEMOGLOBIN  10.3*  10.6*  11.2*   HEMATOCRIT  32.6*  33.0*  34.3*   MCV  96.7  95.9  94.8   MCH  30.6  30.8  30.9   MCHC  31.6*  32.1*  32.7*   RDW  51.5*  51.6*  51.4*   PLATELETCT  244  235  297   MPV  10.9  10.8  10.8     Recent Labs      05/04/18   0357  05/05/18   0336  05/06/18   0350   SODIUM  138  139  138   POTASSIUM  4.7  4.1  4.1   CHLORIDE  110  110  108   CO2  19*  19*  21   GLUCOSE  180*  132*  134*   BUN  42*  39*  35*   CREATININE  1.12  1.11  1.07   CALCIUM  8.5  8.6  8.9         Recent Labs      05/04/18   0357  05/06/18   0709   BNPBTYPENAT  1192*  1417*              Assessment/Plan     * Comfort measures only status- (present on admission)    Assessment & Plan    Discussed with patient 05/06/18  Patient transitioned to comfort measures        Chest pain- (present on admission)   Assessment & Plan    Worsening CP 05/06/18  With profound anxiety / panic attacks    Etiology: Recurrent Occlusive CAD, ? Subendocardial ischemia with Severe AS, Dressler's syndrome / others    CTA PE negative.    Discussed with Dr Patel  Per cardiology patient not good candidate for further interventions including repeat angiography / TAVR  Cardiology team at this time recommend hospice care / comfort measures  Discussed with patient 05/06/18  Patient wants to proceed with comfort measures / hospice care at this time  Tried to reach the son multiple times over the phone 05/06/18 and not successful  Patient able to his own decisions  Patient wants to be able to go back to his home    Given above patient transitioned to comfort measures  Continue current medical regimen as patient would like to do this in ability to be back home  Start Scheduled toradol, add pepcid  Start scheduled morphine low dose  Xanax as needed for anxiety  Nasal congestion worsening anxiety / symptoms. Scheduled afrin / ocean spray  Discuss with SW regarding hospice placement at home which may be difficult given no hospice services in patient's local town        Pleural effusion- (present on admission)   Assessment & Plan    B/L   CHF related  Patient is relatively asymptomatic  Minimal O2 requirements now  High risk thoracentesis with DAPT / Aortic stenosis  Poor tolerance of aggressive Diuresis  Patient wants to proceed with comfort measures at this time  Continue PO Lasix / HF regimen  Continue clinical monitoring and optimize clinically as able        Acute hypoxemic respiratory failure (HCC)- (present on admission)   Assessment & Plan    Improved  This is 2/2 acute cardiogenic pulmonary edema POA and b/l pleural effusions in the setting of ACS / CHF  Continue PO Lasix  Unable to tolerate aggressive  diuresis because of hypotension  Abx management  Wean off O2 as able  Persistent O2 needs  DVT duplex obtaiend and negative. CTA PE negative.         Acute systolic (congestive) heart failure (HCC)- (present on admission)   Assessment & Plan    Continue Lasix / Aldactone  BB / ACE-I  Appreciated cardiology recommendations  Patient wants to proceed with comfort measures and hospice care as able at his home        Severe aortic stenosis- (present on admission)   Assessment & Plan    Poor candidate for TAVR per cardiology team  Recommend hospice care  Discussed with patient 05/06/18  Patient wants to proceed with hospice / comfort measures at this time        Encephalopathy- (present on admission)   Assessment & Plan    Resolved  2/2 Hospital / ICU delirium in the setting of underlying parkinson disease  Continue Seroquel 100 mg HS while in the hospital  Continue comfort measures  Aspiration / Fall precautions  Close clinical monitoring         NSTEMI (non-ST elevated myocardial infarction) (HCC)- (present on admission)   Assessment & Plan    Noted outside facility, transferred here for follow-up care, now status post cardiac cath with stenting  Continue DAPT, BB, ACE-I, High intensity statin therapy  Now with ongoing chest pain  Symptomatic from above  Elevated troponin's  EKG revealing ischemic changes  Poor candidate for further interventions per Dr Patel  Cardiology recommended comfort measures / hospice care  Discussed with patient  Patient initiated on comfort measures 05/06/18        Anemia- (present on admission)   Assessment & Plan    Likely AOCD  Iron / MV supplementation         Leukocytosis- (present on admission)   Assessment & Plan    Resolved  Underlying pneumonia cannot be ruled out  UA is negative  Encephalopathy improved  Given advanced age, belief empiric abx therapy in the setting of elevated ESR, CRP, Procalcitonin and encephalopathy is reasonable   De escalated to PO Augmentin / Doxycycline    Stop dates in place for abx therapy        Wheezing- (present on admission)   Assessment & Plan    Resolved  Cardiac in etiology  RT protocol  Continue to monitor        Cognitive decline- (present on admission)   Assessment & Plan    In the setting of parkinson disease  Back to baseline  Appreciate SLP input        Parkinson's disease (HCC)- (present on admission)   Assessment & Plan    Continue Sinemet and selegiline, home meds  Ambulating well within hospital wards  SOB / CP / Anxiety / Panic attacks / Cardiac comorbidities limiting ambulation        Seizure disorder (HCC)- (present on admission)   Assessment & Plan    No new seizures noted, continue Keppra and gabapentin at home doses  Encephalopathy, EEG negative  Encephalopathy has resolved now              This patient is critically ill with a life threatening illness as noted above (Including recurrent ACS with chest pain and severe AS) requiring my direct presence, involvement and maximal preparedness with bedside assessment of his condition, EKG and making critical decisions. I have personally spend 40 minutes providing critical care to this patient. Time spend on critical care excludes time spend on procedures if any.     Today I have personally spend 45 minutes discussing advance care planning with the patient. Time spend on advance care planning is in addition to time spend providing critical care to this patient which has been billed separately.   Quality-Core Measures   Reviewed items::  Labs reviewed, Medications reviewed and Radiology images reviewed  Thomas catheter::  No Thomas  DVT prophylaxis pharmacological::  Contraindicated - High bleeding risk  DVT prophylaxis - mechanical:  SCDs  Antibiotics:  Treating active infection/contamination beyond 24 hours perioperative coverage  Assessed for rehabilitation services:  Patient was assess for and/or received rehabilitation services during this hospitalization

## 2018-05-06 NOTE — CARE PLAN
Problem: Communication  Goal: The ability to communicate needs accurately and effectively will improve  Outcome: PROGRESSING AS EXPECTED    Intervention: [place] patient and significant other/support system to call light to alert staff of needs   05/06/18 0117   OTHER   Oriented to: All of the Following : Location of Bathroom, Visiting Policy, Unit Routine, Call Light and Bedside Controls, Bedside Rail Policy, Smoking Policy, Rights and Responsibilities, Bedside Report, and Patient Education Notebook         Problem: Knowledge Deficit  Goal: Knowledge of disease process/condition, treatment plan, diagnostic tests, and medications will improve  Outcome: PROGRESSING AS EXPECTED  Patient educated about disease process and plan of care for the shift. Patient expressed understanding. All questions answered at this time.

## 2018-05-06 NOTE — PROGRESS NOTES
[facility] Hospitalist Progress Note    Date of Service: 5/5/2018    Chief Complaint  72 y.o. male admitted 4/30/2018 with chest pain / NSTEMI s/p PCI with hospital course c/b hospital induced delirium in the setting of parkinson's disease.     Interval Problem Update  Patient seen and evaluated on rounds  Encephalopathy has resolved  A&OX 4 now  Persistent SOB with ambulation  Lives remotely  Discussed with PT/OT  Recommend SNF now  DVT study negative  Appreciate cardiology team input  No other concerns per nursing team  Discussed with Social workers  Needs to have medications arranged    Consultants/Specialty  Cardiology    Disposition  Continue telemetry monitoring  Patient wants to discharge home  Recommend SNF  Working on SNF placement at this time  Discussed with SW  Appreciate physiatry input  Plan for discharge to SNF on monday        Review of Systems   Constitutional: Negative for chills, diaphoresis, fever, malaise/fatigue and weight loss.   HENT: Negative for hearing loss and tinnitus.    Eyes: Negative for blurred vision and double vision.   Respiratory: Positive for shortness of breath. Negative for cough, hemoptysis, sputum production and wheezing.    Cardiovascular: Negative for chest pain, palpitations, orthopnea, claudication, leg swelling and PND.   Gastrointestinal: Negative for abdominal pain, blood in stool, constipation, diarrhea, heartburn, melena, nausea and vomiting.   Genitourinary: Negative for dysuria, flank pain, frequency, hematuria and urgency.   Musculoskeletal: Negative for back pain, falls, joint pain, myalgias and neck pain.   Skin: Negative for itching and rash.   Neurological: Positive for tremors. Negative for dizziness, tingling, sensory change, speech change, focal weakness, seizures, loss of consciousness, weakness and headaches.   Psychiatric/Behavioral: Negative for depression. The patient is nervous/anxious.       Physical Exam  Laboratory/Imaging   Hemodynamics  Temp  (24hrs), Av.4 °C (97.6 °F), Min:36.4 °C (97.5 °F), Max:36.5 °C (97.7 °F)   Temperature: 36.4 °C (97.6 °F)  Pulse  Av.4  Min: 59  Max: 122   Blood Pressure : 111/69      Respiratory      Respiration: 18, Pulse Oximetry: 95 %     Work Of Breathing / Effort: Moderate;Shallow;Tachypnea  RUL Breath Sounds: Clear, RML Breath Sounds: Clear, RLL Breath Sounds: Diminished, NOA Breath Sounds: Clear, LLL Breath Sounds: Diminished    Fluids    Intake/Output Summary (Last 24 hours) at 18  Last data filed at 18 1200   Gross per 24 hour   Intake              240 ml   Output              300 ml   Net              -60 ml       Nutrition  Orders Placed This Encounter   Procedures   • Diet Order     Standing Status:   Standing     Number of Occurrences:   1     Order Specific Question:   Diet:     Answer:   Cardiac [6]     Physical Exam   Constitutional: He is oriented to person, place, and time. He appears well-developed.  Non-toxic appearance. No distress.   HENT:   Head: Normocephalic and atraumatic.   Mouth/Throat: Oropharynx is clear and moist. No oropharyngeal exudate.   Eyes: Conjunctivae are normal. Pupils are equal, round, and reactive to light. Right eye exhibits no discharge. Left eye exhibits no discharge. No scleral icterus.   Neck: Normal range of motion. No JVD present. No edema and no erythema present.   Cardiovascular: Normal rate and regular rhythm.  Exam reveals no gallop and no friction rub.    Murmur heard.  Pulmonary/Chest: Effort normal. No stridor. No respiratory distress. He has no wheezes. He has rales. He exhibits no tenderness.   Abdominal: Soft. Bowel sounds are normal. He exhibits no distension. There is no tenderness. There is no rebound.   Musculoskeletal: Normal range of motion. He exhibits edema (Minimal). He exhibits no tenderness or deformity.   Neurological: He is alert and oriented to person, place, and time. He has normal reflexes. No cranial nerve deficit.   Skin: Skin  is warm and dry. No rash noted. He is not diaphoretic. No erythema.   Psychiatric: He has a normal mood and affect. Judgment and thought content normal. He is slowed. Cognition and memory are impaired.   Anxious   Nursing note and vitals reviewed.      Recent Labs      05/03/18 0359 05/04/18 0357 05/05/18   0336   WBC  7.2  11.8*  7.6   RBC  3.48*  3.37*  3.44*   HEMOGLOBIN  10.6*  10.3*  10.6*   HEMATOCRIT  33.8*  32.6*  33.0*   MCV  97.1  96.7  95.9   MCH  30.5  30.6  30.8   MCHC  31.4*  31.6*  32.1*   RDW  51.8*  51.5*  51.6*   PLATELETCT  225  244  235   MPV  11.3  10.9  10.8     Recent Labs      05/03/18   0359 05/04/18 0357 05/05/18   0336   SODIUM  139  138  139   POTASSIUM  3.9  4.7  4.1   CHLORIDE  110  110  110   CO2  18*  19*  19*   GLUCOSE  125*  180*  132*   BUN  44*  42*  39*   CREATININE  1.15  1.12  1.11   CALCIUM  8.8  8.5  8.6     Recent Labs      05/03/18   0359   INR  1.23*     Recent Labs      05/04/18   0357   BNPBTYPENAT  1192*              Assessment/Plan     * NSTEMI (non-ST elevated myocardial infarction) (HCC)- (present on admission)   Assessment & Plan    Noted outside facility, transferred here for follow-up care, now status post cardiac cath with stenting  Continue DAPT, BB, ACE-I, High intensity statin therapy  Further recommendations per cardiology team   Close outpatient cardiology follow up        Acute hypoxemic respiratory failure (HCC)- (present on admission)   Assessment & Plan    Persistent   This is 2/2 acute cardiogenic pulmonary edema POA  Continue PO Lasix  Unable to tolerate aggressive diuresis because of hypotension  Abx management  Wean off O2 as able  Persistent O2 needs  DVT duplex obtaiend and negative. Unlikely PE        Leukocytosis- (present on admission)   Assessment & Plan    Etiology uncertain  Underlying pneumonia cannot be ruled out  Improved with IV ceftriaxone x 1  UA is negative  Encephalopathy improved  Given advanced age, belief empiric abx  therapy in the setting of elevated ESR, CRP, Procalcitonin and encephalopathy is reasonable   De escalated to PO Augmentin / Doxycycline   Aim total 7 days of therapy        Acute systolic (congestive) heart failure (HCC)- (present on admission)   Assessment & Plan    Continue Lasix / Aldactone  BB / ACE-I  Cardiology team on board  Further recommendations per cardiology team  Palliative consultation for advance care planning obtained  Close outpatient cardiology follow up        Severe aortic stenosis- (present on admission)   Assessment & Plan    Further recommendations per cardiology team   Plan for outpatient TAVR evaluation         Encephalopathy- (present on admission)   Assessment & Plan    Resolved  2/2 Hospital / ICU delirium in the setting of underlying parkinson disease  Continue Seroquel 100 mg HS while in the hospital  Avoid sedative, narcotics / BZDs as able  Aspiration / Fall precautions  Empiric abx (PNA / Infectious concerns cannot be ruled out)  Close clinical monitoring         Anemia- (present on admission)   Assessment & Plan    Likely AOCD  Iron / MV supplementation   Monitor Hb / Restrictive transfusion strategy  Close monitoring per PCP in outpatient setting        Wheezing- (present on admission)   Assessment & Plan    Resolved  BD therapy  RT protocol  Continue to monitor        Cognitive decline- (present on admission)   Assessment & Plan    In the setting of parkinson disease  Back to baseline  Appreciate SLP input        Parkinson's disease (HCC)- (present on admission)   Assessment & Plan    Continue Sinemet and selegiline, home meds  Ambulating well within hospital wards  SOB limiting ambulation  Outpatient neurology evaluation         Seizure disorder (HCC)- (present on admission)   Assessment & Plan    No new seizures noted, continue Keppra and gabapentin at home doses  Encephalopathy, EEG negative  Encephalopathy has resolved now          Quality-Core Measures   Reviewed items::   Labs reviewed, Medications reviewed and Radiology images reviewed  Thomas catheter::  No Thomas  DVT prophylaxis - mechanical:  SCDs  Antibiotics:  Treating active infection/contamination beyond 24 hours perioperative coverage  Assessed for rehabilitation services:  Patient was assess for and/or received rehabilitation services during this hospitalization

## 2018-05-06 NOTE — ASSESSMENT & PLAN NOTE
Worsening CP 05/06/18, with profound anxiety / panic attacks in the setting of NSTEMI    Etiology: Recurrent Occlusive CAD, ? Subendocardial ischemia with Severe AS, Dressler's syndrome / others    CTA PE negative.    Discussed with Dr Patel and they feel pt not good candidate for further interventions including repeat angiography / TAVR  Cardiology team at this time recommend hospice care / comfort measures  Discussed with patient 05/06/18 and he wishes to proceed with comfort/hospice  Patient wants to proceed with comfort measures / hospice care at this time  Patient wants to be able to go back to his home    Continue Scheduled toradol, pepcid  Morphine PRN   Xanax as needed for anxiety  Nasal congestion worsening anxiety / symptoms. Scheduled afrin / ocean spray  Discuss with SW regarding hospice placement at home which may be difficult given no hospice services in patient's local town

## 2018-05-07 LAB
BACTERIA BLD CULT: NORMAL
BACTERIA BLD CULT: NORMAL
SIGNIFICANT IND 70042: NORMAL
SIGNIFICANT IND 70042: NORMAL
SITE SITE: NORMAL
SITE SITE: NORMAL
SOURCE SOURCE: NORMAL
SOURCE SOURCE: NORMAL

## 2018-05-07 PROCEDURE — A9270 NON-COVERED ITEM OR SERVICE: HCPCS | Performed by: INTERNAL MEDICINE

## 2018-05-07 PROCEDURE — 700102 HCHG RX REV CODE 250 W/ 637 OVERRIDE(OP): Performed by: NURSE PRACTITIONER

## 2018-05-07 PROCEDURE — 700102 HCHG RX REV CODE 250 W/ 637 OVERRIDE(OP): Performed by: INTERNAL MEDICINE

## 2018-05-07 PROCEDURE — A9270 NON-COVERED ITEM OR SERVICE: HCPCS | Performed by: NURSE PRACTITIONER

## 2018-05-07 PROCEDURE — 700111 HCHG RX REV CODE 636 W/ 250 OVERRIDE (IP): Performed by: INTERNAL MEDICINE

## 2018-05-07 PROCEDURE — 99232 SBSQ HOSP IP/OBS MODERATE 35: CPT | Performed by: INTERNAL MEDICINE

## 2018-05-07 PROCEDURE — 770001 HCHG ROOM/CARE - MED/SURG/GYN PRIV*

## 2018-05-07 RX ORDER — MORPHINE SULFATE 4 MG/ML
2 INJECTION, SOLUTION INTRAMUSCULAR; INTRAVENOUS
Status: DISCONTINUED | OUTPATIENT
Start: 2018-05-07 | End: 2018-05-16 | Stop reason: HOSPADM

## 2018-05-07 RX ADMIN — AMOXICILLIN AND CLAVULANATE POTASSIUM 1 TABLET: 875; 125 TABLET, FILM COATED ORAL at 20:52

## 2018-05-07 RX ADMIN — CARBIDOPA AND LEVODOPA 1.5 TABLET: 25; 100 TABLET ORAL at 20:55

## 2018-05-07 RX ADMIN — ATORVASTATIN CALCIUM 40 MG: 40 TABLET, FILM COATED ORAL at 17:57

## 2018-05-07 RX ADMIN — POTASSIUM CHLORIDE 20 MEQ: 1500 TABLET, EXTENDED RELEASE ORAL at 09:39

## 2018-05-07 RX ADMIN — GABAPENTIN 300 MG: 300 CAPSULE ORAL at 20:57

## 2018-05-07 RX ADMIN — LEVETIRACETAM 1500 MG: 500 TABLET, FILM COATED ORAL at 20:57

## 2018-05-07 RX ADMIN — OXYCODONE HYDROCHLORIDE AND ACETAMINOPHEN 500 MG: 500 TABLET ORAL at 07:18

## 2018-05-07 RX ADMIN — OXYMETAZOLINE HYDROCHLORIDE 2 SPRAY: 5 SPRAY NASAL at 18:03

## 2018-05-07 RX ADMIN — AMOXICILLIN AND CLAVULANATE POTASSIUM 1 TABLET: 875; 125 TABLET, FILM COATED ORAL at 09:38

## 2018-05-07 RX ADMIN — TICAGRELOR 90 MG: 90 TABLET ORAL at 09:38

## 2018-05-07 RX ADMIN — FUROSEMIDE 40 MG: 40 TABLET ORAL at 07:16

## 2018-05-07 RX ADMIN — GABAPENTIN 300 MG: 300 CAPSULE ORAL at 14:20

## 2018-05-07 RX ADMIN — CARBIDOPA AND LEVODOPA 1.5 TABLET: 25; 100 TABLET ORAL at 07:12

## 2018-05-07 RX ADMIN — METOPROLOL SUCCINATE 12.5 MG: 25 TABLET, EXTENDED RELEASE ORAL at 17:50

## 2018-05-07 RX ADMIN — MORPHINE SULFATE 2 MG: 4 INJECTION INTRAVENOUS at 22:19

## 2018-05-07 RX ADMIN — Medication 500 MCG: at 07:17

## 2018-05-07 RX ADMIN — KETOROLAC TROMETHAMINE 15 MG: 30 INJECTION, SOLUTION INTRAMUSCULAR at 12:42

## 2018-05-07 RX ADMIN — SPIRONOLACTONE 12.5 MG: 25 TABLET ORAL at 09:38

## 2018-05-07 RX ADMIN — SELEGILINE HYDROCHLORIDE 5 MG: 5 TABLET ORAL at 17:57

## 2018-05-07 RX ADMIN — KETOROLAC TROMETHAMINE 15 MG: 30 INJECTION, SOLUTION INTRAMUSCULAR at 01:03

## 2018-05-07 RX ADMIN — QUETIAPINE FUMARATE 100 MG: 25 TABLET ORAL at 20:59

## 2018-05-07 RX ADMIN — THERA TABS 1 TABLET: TAB at 09:39

## 2018-05-07 RX ADMIN — DOXYCYCLINE 100 MG: 100 TABLET ORAL at 20:52

## 2018-05-07 RX ADMIN — DOXYCYCLINE 100 MG: 100 TABLET ORAL at 09:37

## 2018-05-07 RX ADMIN — KETOROLAC TROMETHAMINE 15 MG: 30 INJECTION, SOLUTION INTRAMUSCULAR at 07:09

## 2018-05-07 RX ADMIN — TICAGRELOR 90 MG: 90 TABLET ORAL at 17:58

## 2018-05-07 RX ADMIN — OXYMETAZOLINE HYDROCHLORIDE 2 SPRAY: 5 SPRAY NASAL at 09:37

## 2018-05-07 RX ADMIN — KETOROLAC TROMETHAMINE 15 MG: 30 INJECTION, SOLUTION INTRAMUSCULAR at 18:01

## 2018-05-07 RX ADMIN — FAMOTIDINE 20 MG: 20 TABLET ORAL at 07:09

## 2018-05-07 RX ADMIN — FERROUS GLUCONATE 324 MG: 324 TABLET ORAL at 07:16

## 2018-05-07 RX ADMIN — LEVETIRACETAM 1500 MG: 500 TABLET, FILM COATED ORAL at 09:38

## 2018-05-07 RX ADMIN — CARBIDOPA AND LEVODOPA 1.5 TABLET: 25; 100 TABLET ORAL at 12:45

## 2018-05-07 RX ADMIN — OXYCODONE HYDROCHLORIDE AND ACETAMINOPHEN 500 MG: 500 TABLET ORAL at 14:20

## 2018-05-07 RX ADMIN — FAMOTIDINE 20 MG: 20 TABLET ORAL at 17:50

## 2018-05-07 RX ADMIN — SELEGILINE HYDROCHLORIDE 5 MG: 5 TABLET ORAL at 07:09

## 2018-05-07 RX ADMIN — OXYCODONE HYDROCHLORIDE AND ACETAMINOPHEN 500 MG: 500 TABLET ORAL at 17:55

## 2018-05-07 RX ADMIN — ASPIRIN 81 MG: 81 TABLET, COATED ORAL at 07:16

## 2018-05-07 RX ADMIN — FERROUS GLUCONATE 324 MG: 324 TABLET ORAL at 17:50

## 2018-05-07 RX ADMIN — MORPHINE SULFATE 2 MG: 4 INJECTION INTRAVENOUS at 14:59

## 2018-05-07 RX ADMIN — CITALOPRAM HYDROBROMIDE 20 MG: 20 TABLET ORAL at 07:17

## 2018-05-07 RX ADMIN — GABAPENTIN 300 MG: 300 CAPSULE ORAL at 07:18

## 2018-05-07 ASSESSMENT — ENCOUNTER SYMPTOMS
SHORTNESS OF BREATH: 0
CONSTIPATION: 0
HEARTBURN: 0
FALLS: 0
SPEECH CHANGE: 0
BACK PAIN: 0
FEVER: 0
MYALGIAS: 0
HEMOPTYSIS: 0
NAUSEA: 0
BLOOD IN STOOL: 0
ABDOMINAL PAIN: 0
WHEEZING: 0
DOUBLE VISION: 0
TINGLING: 0
WEIGHT LOSS: 0
DEPRESSION: 0
DIAPHORESIS: 0
CHILLS: 0
CLAUDICATION: 0
NERVOUS/ANXIOUS: 1
PALPITATIONS: 1
WEAKNESS: 1
SEIZURES: 0
ORTHOPNEA: 0
PALPITATIONS: 0
DIZZINESS: 0
HEADACHES: 0
FOCAL WEAKNESS: 0
NECK PAIN: 0
TREMORS: 1
WEAKNESS: 0
DIARRHEA: 0
FLANK PAIN: 0
VOMITING: 0
BLURRED VISION: 0
LOSS OF CONSCIOUSNESS: 0
SHORTNESS OF BREATH: 1
SPUTUM PRODUCTION: 0
COUGH: 0
PND: 0
SENSORY CHANGE: 0

## 2018-05-07 ASSESSMENT — PAIN SCALES - GENERAL
PAINLEVEL_OUTOF10: 4
PAINLEVEL_OUTOF10: 0
PAINLEVEL_OUTOF10: 3
PAINLEVEL_OUTOF10: 0
PAINLEVEL_OUTOF10: 7
PAINLEVEL_OUTOF10: 0

## 2018-05-07 NOTE — CARE PLAN
Problem: Safety  Goal: Will remain free from falls    Intervention: Implement fall precautions   05/06/18 1949   OTHER   Environmental Precautions Treaded Slipper Socks on Patient;Personal Belongings, Wastebasket, Call Bell etc. in Easy Reach;Transferred to Stronger Side;Communication Sign for Patients & Families;Bed in Low Position;Report Given to Other Health Care Providers Regarding Fall Risk;Mobility Assessed & Appropriate Sign Placed   IV Pole on Same Side of Bed as Bathroom Yes   Bedrails Bedrails Closest to Bathroom Down   Chair/Bed Strip Alarm Yes - Alarm On   Safety measures in place. Call light in reach, skid proof socks on, bed and strip alarm activated and hourly rounding completed for pt. Needs and safety.      Problem: Pain Management  Goal: Pain level will decrease to patient's comfort goal    Intervention: Follow pain managment plan developed in collaboration with patient and Interdisciplinary Team  Comfort level assessed with hourly rounding and prn. pain med. Given as needed.

## 2018-05-07 NOTE — DISCHARGE PLANNING
Anticipated Discharge Disposition: Hospice    Action: LSW left message for Charity from [employer] [phone]) and requested call back.     Barriers to Discharge: None    Plan: Awaiting call back from Charity from [employer]. [facility].

## 2018-05-07 NOTE — FACE TO FACE
Face to Face Supporting Documentation - Home Health    The encounter with this patient was in whole or in part the primary reason for home health admission.    Date of encounter:   Patient:                    MRN:                       YOB: 2018  Prince Pham  [id number]  1945     Home health to see patient for:  Skilled Nursing care for assessment, interventions & education, Wound Care, Registered dietitian consult, Medical social work consult, Home health aide, Physical Therapy evaluation and treatment, Occupational therapy evaluation and treatment and Speech Language Pathology evaluation and treatment    Skilled need for:  Comment: CHF     Skilled nursing interventions to include:  Comment: Education regarding hospice care to friends / family    Homebound status evidenced by:  Needs the assistance of another person in order to leave the home. Leaving home requires a considerable and taxing effort. There is a normal inability to leave the home.    Community Physician to provide follow up care: Ze Whitt M.D.     Optional Interventions? No      I certify the face to face encounter for this home health care referral meets the CMS requirements and the encounter/clinical assessment with the patient was, in whole, or in part, for the medical condition(s) listed above, which is the primary reason for home health care. Based on my clinical findings: the service(s) are medically necessary, support the need for home health care, and the homebound criteria are met.  I certify that this patient has had a face to face encounter by myself.  Tessa Cowan M.D. - NPI: [id number]

## 2018-05-07 NOTE — PROGRESS NOTES
Sitting up in bed. Hs meds given. Pt. Calm and pleasant, denies pain or sob. Reminiscing about his life in [place].

## 2018-05-07 NOTE — CARE PLAN
Problem: Communication  Goal: The ability to communicate needs accurately and effectively will improve  Outcome: PROGRESSING AS EXPECTED  Reviewed POC for shift, Pt verbalizes understanding.    Problem: Safety  Goal: Will remain free from injury  Outcome: PROGRESSING AS EXPECTED  Reviewed fall/injury prevention and interventions, Pt verbalizes understanding.

## 2018-05-07 NOTE — PALLIATIVE CARE
Palliative Care follow-up  Pt on CC with plans for hospice. Pt lives alone and has one living son who lives in [place] and works full time. Message left with KEVIN [facility] to [facility] with [facility] given he is a [place] vet.       Plan: assist as needed with DC plan    Thank you for allowing Palliative Care to participate in this patient's care. Please feel free to call [phone] with any questions or concerns.

## 2018-05-07 NOTE — DISCHARGE PLANNING
Anticipated Discharge Disposition: Home with friends    Action: During IDT rounds, reported that pt does not want to go to a SNF. Pt would like to go home with friends. LSW left message for pt's son, Bong ([phone]).    Received call back from Bong, pt's son. Bong states pt lives alone, does not have much support, and the friends the pt has are not able to stay with the pt on a regular basis.     Barriers to Discharge: Lack of support at home. No hospice services in [place].     Plan: Inquire about other options and places for pt to discharge safely to.

## 2018-05-07 NOTE — PROGRESS NOTES
Assumed care, Pt AAOx4. Ambulates x1 assist w/cane. Call bell in reach, bed in lowest position, safety maintained. WCTM.

## 2018-05-07 NOTE — PROGRESS NOTES
Cardiology Progress Note               Author: En Pino Date & Time created: 2018  8:42 AM     Interval History:  72-year-old  presents from [place] with chest pain found to have very tight left circumflex but also new cardiomyopathy in the setting of likely low flow low gradient severe AS. HX: parkinson      Chief Complaint:  Chest pain    consultation: NSTEMI    18:    Echocardiography Laboratory  18  Limited echo to evaluate EF and aortic valve.  Severely reduced left ventricular systolic function.  Left ventricular ejection fraction is visually estimated to be 20%.  Global hypokinesis.  Severe aortic stenosis.  Aortic insuffiency not fully assessed but appears mild to moderate.     Review of Systems   Constitutional: Positive for malaise/fatigue.   Respiratory: Negative for cough, hemoptysis, sputum production, shortness of breath and wheezing.    Cardiovascular: Negative for chest pain, palpitations, orthopnea, claudication, leg swelling and PND.   Neurological: Positive for weakness.       Physical Exam   Constitutional: No distress.   Cardiovascular: Normal rate and regular rhythm.    Murmur heard.  Pulmonary/Chest: Effort normal and breath sounds normal.   Musculoskeletal: He exhibits no edema.   Neurological: He is alert.   Skin: Skin is warm and dry. He is not diaphoretic.       Hemodynamics:  Temp (24hrs), Av.4 °C (97.5 °F), Min:36.1 °C (97 °F), Max:36.7 °C (98 °F)  Temperature: 36.1 °C (97 °F)  Pulse  Av.9  Min: 59  Max: 123  Blood Pressure : 100/58     Respiratory:    Respiration: 18, Pulse Oximetry: 96 %     Work Of Breathing / Effort: Moderate;Shallow;Tachypnea  RUL Breath Sounds: Clear, RML Breath Sounds: Clear, RLL Breath Sounds: Diminished, NOA Breath Sounds: Clear, LLL Breath Sounds: Diminished  Fluids:     Weight: 95 kg (209 lb 7 oz)  GI/Nutrition:  Orders Placed This Encounter   Procedures   • Diet Order     Standing Status:   Standing     Number of Occurrences:    1     Order Specific Question:   Diet:     Answer:   Cardiac [6]     Lab Results:  Recent Labs      05/05/18   0336  05/06/18   0350   WBC  7.6  8.2   RBC  3.44*  3.62*   HEMOGLOBIN  10.6*  11.2*   HEMATOCRIT  33.0*  34.3*   MCV  95.9  94.8   MCH  30.8  30.9   MCHC  32.1*  32.7*   RDW  51.6*  51.4*   PLATELETCT  235  297   MPV  10.8  10.8     Recent Labs      05/05/18   0336  05/06/18   0350   SODIUM  139  138   POTASSIUM  4.1  4.1   CHLORIDE  110  108   CO2  19*  21   GLUCOSE  132*  134*   BUN  39*  35*   CREATININE  1.11  1.07   CALCIUM  8.6  8.9         Recent Labs      05/06/18   0709   BNPBTYPENAT  1417*     Recent Labs      05/06/18   0709  05/06/18   0757  05/06/18   1211   TROPONINI  4.79*  4.44*  5.47*   BNPBTYPENAT  1417*   --    --              Medical Decision Making, by Problem:  Active Hospital Problems    Diagnosis   • *Comfort measures only status [Z51.5]   • Chest pain [R07.9]   • Pleural effusion [J90]   • Acute hypoxemic respiratory failure ([facility]) [J96.01]   • Encephalopathy [G93.40]   • Severe aortic stenosis [I35.0]   • Acute systolic (congestive) heart failure ([facility]) [I50.21]   • NSTEMI (non-ST elevated myocardial infarction) ([facility]) [I21.4]   • Wheezing [R06.2]   • Leukocytosis [D72.829]   • Anemia [D64.9]   • Cognitive decline [R41.89]   • Seizure disorder ([facility]) [G40.909]   • Parkinson's disease ([facility]) [G20]       Plan:  1. CAD:  - s/p PCI to left circumflex   - ECHO; ef 20%      2. Severe AS:  - not candidate for TAVR due to life expectancy less than 1 year       Patient and family decided on comfort care for the patient.  Cardiology will sign off, please call us with any questions.        Quality-Core Measures

## 2018-05-07 NOTE — HEART FAILURE PROGRAM
Monday update:   [facility] Plan notes now indicating that patient will need to discharge home with SNF. Requested that hospital schedulers cancel patient's [facility] appt on 5/16. With hospice DC, HF f/u appointment not indicated.  Thank you, please call with questions, [phone]

## 2018-05-07 NOTE — PROGRESS NOTES
Patient' son Bong called.   Updated regarding all events of the day  Discussed underlying medical issues including severe AS, ACS and systolic CHF  Discussed anticipate poor prognosis with above and per cardiology team poor prognosis with high risk of mortality within 1 year  Shared discussions with his father and cardiology input in detail  He appreciated the call  All questions / concerns answered  He will also speak to his father and inform us regarding any questions / concerns he may have    Tessa Cowan M.D.  05/06/18  6:04 PM

## 2018-05-08 PROBLEM — R06.2 WHEEZING: Status: RESOLVED | Noted: 2018-05-02 | Resolved: 2018-05-08

## 2018-05-08 PROCEDURE — 700102 HCHG RX REV CODE 250 W/ 637 OVERRIDE(OP): Performed by: NURSE PRACTITIONER

## 2018-05-08 PROCEDURE — G8988 SELF CARE GOAL STATUS: HCPCS | Mod: CI

## 2018-05-08 PROCEDURE — 700102 HCHG RX REV CODE 250 W/ 637 OVERRIDE(OP): Performed by: INTERNAL MEDICINE

## 2018-05-08 PROCEDURE — 770001 HCHG ROOM/CARE - MED/SURG/GYN PRIV*

## 2018-05-08 PROCEDURE — 700112 HCHG RX REV CODE 229: Performed by: INTERNAL MEDICINE

## 2018-05-08 PROCEDURE — A9270 NON-COVERED ITEM OR SERVICE: HCPCS | Performed by: INTERNAL MEDICINE

## 2018-05-08 PROCEDURE — G8989 SELF CARE D/C STATUS: HCPCS | Mod: CL

## 2018-05-08 PROCEDURE — 700111 HCHG RX REV CODE 636 W/ 250 OVERRIDE (IP): Performed by: INTERNAL MEDICINE

## 2018-05-08 PROCEDURE — 99232 SBSQ HOSP IP/OBS MODERATE 35: CPT | Performed by: INTERNAL MEDICINE

## 2018-05-08 PROCEDURE — A9270 NON-COVERED ITEM OR SERVICE: HCPCS | Performed by: NURSE PRACTITIONER

## 2018-05-08 RX ORDER — FAMOTIDINE 20 MG/1
20 TABLET, FILM COATED ORAL 2 TIMES DAILY
Qty: 60 TAB | Refills: 2 | Status: SHIPPED | OUTPATIENT
Start: 2018-05-08 | End: 2018-05-14

## 2018-05-08 RX ORDER — ALPRAZOLAM 1 MG/1
1 TABLET ORAL EVERY 4 HOURS PRN
Qty: 30 TAB | Refills: 0 | Status: SHIPPED | OUTPATIENT
Start: 2018-05-08 | End: 2018-05-14

## 2018-05-08 RX ADMIN — KETOROLAC TROMETHAMINE 15 MG: 30 INJECTION, SOLUTION INTRAMUSCULAR at 06:59

## 2018-05-08 RX ADMIN — POTASSIUM CHLORIDE 20 MEQ: 1500 TABLET, EXTENDED RELEASE ORAL at 07:13

## 2018-05-08 RX ADMIN — CARBIDOPA AND LEVODOPA 1.5 TABLET: 25; 100 TABLET ORAL at 21:01

## 2018-05-08 RX ADMIN — CARBIDOPA AND LEVODOPA 1.5 TABLET: 25; 100 TABLET ORAL at 07:06

## 2018-05-08 RX ADMIN — DOCUSATE CALCIUM 240 MG: 240 CAPSULE, LIQUID FILLED ORAL at 18:22

## 2018-05-08 RX ADMIN — SELEGILINE HYDROCHLORIDE 5 MG: 5 TABLET ORAL at 07:09

## 2018-05-08 RX ADMIN — QUETIAPINE FUMARATE 100 MG: 25 TABLET ORAL at 18:22

## 2018-05-08 RX ADMIN — CARBIDOPA AND LEVODOPA 1.5 TABLET: 25; 100 TABLET ORAL at 17:00

## 2018-05-08 RX ADMIN — FAMOTIDINE 20 MG: 20 TABLET ORAL at 07:06

## 2018-05-08 RX ADMIN — CARBIDOPA AND LEVODOPA 1.5 TABLET: 25; 100 TABLET ORAL at 15:04

## 2018-05-08 RX ADMIN — GABAPENTIN 300 MG: 300 CAPSULE ORAL at 15:00

## 2018-05-08 RX ADMIN — TICAGRELOR 90 MG: 90 TABLET ORAL at 18:22

## 2018-05-08 RX ADMIN — GABAPENTIN 300 MG: 300 CAPSULE ORAL at 18:23

## 2018-05-08 RX ADMIN — AMOXICILLIN AND CLAVULANATE POTASSIUM 1 TABLET: 875; 125 TABLET, FILM COATED ORAL at 18:23

## 2018-05-08 RX ADMIN — ATORVASTATIN CALCIUM 40 MG: 40 TABLET, FILM COATED ORAL at 18:22

## 2018-05-08 RX ADMIN — ALPRAZOLAM 1 MG: 0.5 TABLET ORAL at 21:35

## 2018-05-08 RX ADMIN — METOPROLOL SUCCINATE 12.5 MG: 25 TABLET, EXTENDED RELEASE ORAL at 18:21

## 2018-05-08 RX ADMIN — FAMOTIDINE 20 MG: 20 TABLET ORAL at 18:23

## 2018-05-08 RX ADMIN — LIDOCAINE HYDROCHLORIDE 15 ML: 20 SOLUTION OROPHARYNGEAL at 07:16

## 2018-05-08 RX ADMIN — LEVETIRACETAM 1500 MG: 500 TABLET, FILM COATED ORAL at 07:11

## 2018-05-08 RX ADMIN — LEVETIRACETAM 1500 MG: 500 TABLET, FILM COATED ORAL at 21:06

## 2018-05-08 RX ADMIN — GABAPENTIN 300 MG: 300 CAPSULE ORAL at 07:07

## 2018-05-08 RX ADMIN — AMOXICILLIN AND CLAVULANATE POTASSIUM 1 TABLET: 875; 125 TABLET, FILM COATED ORAL at 07:04

## 2018-05-08 RX ADMIN — ASPIRIN 81 MG: 81 TABLET, COATED ORAL at 07:07

## 2018-05-08 RX ADMIN — KETOROLAC TROMETHAMINE 15 MG: 30 INJECTION, SOLUTION INTRAMUSCULAR at 00:24

## 2018-05-08 RX ADMIN — SELEGILINE HYDROCHLORIDE 5 MG: 5 TABLET ORAL at 18:33

## 2018-05-08 RX ADMIN — TICAGRELOR 90 MG: 90 TABLET ORAL at 07:07

## 2018-05-08 RX ADMIN — KETOROLAC TROMETHAMINE 15 MG: 30 INJECTION, SOLUTION INTRAMUSCULAR at 21:15

## 2018-05-08 RX ADMIN — KETOROLAC TROMETHAMINE 15 MG: 30 INJECTION, SOLUTION INTRAMUSCULAR at 15:03

## 2018-05-08 RX ADMIN — DOXYCYCLINE 100 MG: 100 TABLET ORAL at 07:04

## 2018-05-08 ASSESSMENT — ENCOUNTER SYMPTOMS
HEMOPTYSIS: 0
CLAUDICATION: 0
HEARTBURN: 0
ABDOMINAL PAIN: 0
SENSORY CHANGE: 0
DIARRHEA: 0
MYALGIAS: 0
SHORTNESS OF BREATH: 1
LOSS OF CONSCIOUSNESS: 0
CONSTIPATION: 0
PALPITATIONS: 1
VOMITING: 0
NECK PAIN: 0
BLOOD IN STOOL: 0
WHEEZING: 0
WEIGHT LOSS: 0
FALLS: 0
SPUTUM PRODUCTION: 0
SPEECH CHANGE: 0
TINGLING: 0
WEAKNESS: 0
ORTHOPNEA: 0
DEPRESSION: 0
CHILLS: 0
COUGH: 0
TREMORS: 1
BACK PAIN: 0
DIZZINESS: 0
BLURRED VISION: 0
DOUBLE VISION: 0
PND: 0
FLANK PAIN: 0
FOCAL WEAKNESS: 0
SEIZURES: 0
NAUSEA: 0
NERVOUS/ANXIOUS: 1
FEVER: 0
HEADACHES: 0
DIAPHORESIS: 0

## 2018-05-08 ASSESSMENT — PAIN SCALES - GENERAL
PAINLEVEL_OUTOF10: 3
PAINLEVEL_OUTOF10: 3
PAINLEVEL_OUTOF10: 5
PAINLEVEL_OUTOF10: 0

## 2018-05-08 NOTE — PROGRESS NOTES
[facility] Hospitalist Progress Note    Date of Service: 5/8/2018    Chief Complaint  72 y.o. male admitted 4/30/2018 with chest pain / NSTEMI s/p PCI with hospital course c/b hospital induced delirium in the setting of parkinson's disease. Persistent concerns for ischemia post PCI. Patient transitioned to comfort measures 05/06/18 in line with patient wishes at this time    Interval Problem Update  5/8: Awaiting discharge home with hospice care (not available in [place]). SW working on it  Wants his medications burden decrease, MV stopped  Discussed with nursing staff    Consultants/Specialty  Cardiology  Palliative care    Disposition  On comfort measures   He wants to discharge to his baseline residential situation in [place]  KEVIN working on disposition  No hospice care where he lives, mobility is an issue  Patient can be transferred to oncology RNF while awaiting discharge         Review of Systems   Constitutional: Negative for chills, diaphoresis, fever, malaise/fatigue and weight loss.   HENT: Negative for hearing loss and tinnitus.    Eyes: Negative for blurred vision and double vision.   Respiratory: Positive for shortness of breath. Negative for cough, hemoptysis, sputum production and wheezing.    Cardiovascular: Positive for chest pain and palpitations. Negative for orthopnea, claudication, leg swelling and PND.   Gastrointestinal: Negative for abdominal pain, blood in stool, constipation, diarrhea, heartburn, melena, nausea and vomiting.   Genitourinary: Negative for dysuria, flank pain, frequency, hematuria and urgency.   Musculoskeletal: Negative for back pain, falls, joint pain, myalgias and neck pain.   Skin: Negative for itching and rash.   Neurological: Positive for tremors. Negative for dizziness, tingling, sensory change, speech change, focal weakness, seizures, loss of consciousness, weakness and headaches.   Psychiatric/Behavioral: Negative for depression. The patient is nervous/anxious.        Physical Exam  Laboratory/Imaging   Hemodynamics  Temp (24hrs), Av.5 °C (97.7 °F), Min:36.3 °C (97.4 °F), Max:36.7 °C (98 °F)   Temperature: 36.4 °C (97.6 °F)  Pulse  Av.3  Min: 59  Max: 123   Blood Pressure : 111/83      Respiratory      Respiration: 20, Pulse Oximetry: 96 %     Work Of Breathing / Effort: Moderate;Shallow  RUL Breath Sounds: Clear, RML Breath Sounds: Clear, RLL Breath Sounds: Diminished, NOA Breath Sounds: Clear, LLL Breath Sounds: Diminished    Fluids    Intake/Output Summary (Last 24 hours) at 18 1614  Last data filed at 18 1500   Gross per 24 hour   Intake                0 ml   Output              600 ml   Net             -600 ml       Nutrition  Orders Placed This Encounter   Procedures   • Diet Order     Standing Status:   Standing     Number of Occurrences:   1     Order Specific Question:   Diet:     Answer:   Cardiac [6]     Physical Exam   Constitutional: He is oriented to person, place, and time. He appears well-developed.  Non-toxic appearance. No distress.   HENT:   Head: Normocephalic and atraumatic.   Mouth/Throat: Oropharynx is clear and moist. No oropharyngeal exudate.   Eyes: Conjunctivae are normal. Pupils are equal, round, and reactive to light. Right eye exhibits no discharge. Left eye exhibits no discharge. No scleral icterus.   Neck: Normal range of motion. No JVD present. No edema and no erythema present.   Cardiovascular: Normal rate and regular rhythm.  Exam reveals no gallop and no friction rub.    Murmur heard.  Pulmonary/Chest: Effort normal. No stridor. No respiratory distress. He has no wheezes. He has rales. He exhibits no tenderness.   Abdominal: Soft. Bowel sounds are normal. He exhibits no distension. There is no tenderness. There is no rebound.   Musculoskeletal: Normal range of motion. He exhibits edema (Minimal). He exhibits no tenderness or deformity.   Neurological: He is alert and oriented to person, place, and time. He has normal  reflexes. No cranial nerve deficit.   Skin: Skin is warm and dry. No rash noted. He is not diaphoretic. No erythema.   Psychiatric: He has a normal mood and affect. Judgment and thought content normal. He is slowed. Cognition and memory are impaired.   Anxious   Nursing note and vitals reviewed.      Recent Labs      05/06/18   0350   WBC  8.2   RBC  3.62*   HEMOGLOBIN  11.2*   HEMATOCRIT  34.3*   MCV  94.8   MCH  30.9   MCHC  32.7*   RDW  51.4*   PLATELETCT  297   MPV  10.8     Recent Labs      05/06/18   0350   SODIUM  138   POTASSIUM  4.1   CHLORIDE  108   CO2  21   GLUCOSE  134*   BUN  35*   CREATININE  1.07   CALCIUM  8.9         Recent Labs      05/06/18   0709   BNPBTYPENAT  1417*              Assessment/Plan     * Comfort measures only status- (present on admission)   Assessment & Plan    Discussed with patient 05/06/18  Patient transitioned to comfort measures        Chest pain- (present on admission)   Assessment & Plan    Worsening CP 05/06/18, with profound anxiety / panic attacks in the setting of NSTEMI    Etiology: Recurrent Occlusive CAD, ? Subendocardial ischemia with Severe AS, Dressler's syndrome / others    CTA PE negative.    Discussed with Dr Patel and they feel pt not good candidate for further interventions including repeat angiography / TAVR  Cardiology team at this time recommend hospice care / comfort measures  Discussed with patient 05/06/18 and he wishes to proceed with comfort/hospice  Patient wants to proceed with comfort measures / hospice care at this time  Patient wants to be able to go back to his home    Continue Scheduled toradol, pepcid  Morphine PRN   Xanax as needed for anxiety  Nasal congestion worsening anxiety / symptoms. Scheduled afrin / ocean spray  Discuss with SW regarding hospice placement at home which may be difficult given no hospice services in patient's local town        Pleural effusion- (present on admission)   Assessment & Plan    B/L   CHF related  Patient  is relatively asymptomatic  Minimal O2 requirements now  High risk thoracentesis with DAPT / Aortic stenosis  Poor tolerance of aggressive Diuresis  Patient wants to proceed with comfort measures at this time  Continue PO Lasix / HF regimen  Continue clinical monitoring and optimize clinically as able        Acute hypoxemic respiratory failure (HCC)- (present on admission)   Assessment & Plan    Improved  This is 2/2 acute cardiogenic pulmonary edema POA and b/l pleural effusions in the setting of ACS / CHF  Continue PO Lasix  Unable to tolerate aggressive diuresis because of hypotension  Abx management  Wean off O2 as able  Persistent O2 needs  DVT duplex obtaiend and negative. CTA PE negative.         Acute systolic (congestive) heart failure (HCC)- (present on admission)   Assessment & Plan    Continue Lasix / Aldactone  BB / ACE-I  Appreciated cardiology recommendations  Patient wants to proceed with comfort measures and hospice care as able at his home        Severe aortic stenosis- (present on admission)   Assessment & Plan    Poor candidate for TAVR per cardiology team  Recommend hospice care  Discussed with patient 05/06/18  Patient wants to proceed with hospice / comfort measures at this time        Encephalopathy- (present on admission)   Assessment & Plan    Resolved  2/2 Hospital / ICU delirium in the setting of underlying parkinson disease  Continue Seroquel 100 mg HS while in the hospital  Continue comfort measures  Aspiration / Fall precautions  Close clinical monitoring         NSTEMI (non-ST elevated myocardial infarction) (HCC)- (present on admission)   Assessment & Plan    Noted outside facility, transferred here for follow-up care, now status post cardiac cath with stenting  Continue DAPT, BB, ACE-I, High intensity statin therapy  Now with ongoing chest pain  Symptomatic from above  Elevated troponin's  EKG revealing ischemic changes  Poor candidate for further interventions per   Jorge  Cardiology recommended comfort measures / hospice care  Discussed with patient  Patient initiated on comfort measures 05/06/18        Anemia- (present on admission)   Assessment & Plan    Likely AOCD  Iron / MV supplementation         Leukocytosis- (present on admission)   Assessment & Plan    Resolved  Underlying pneumonia cannot be ruled out  UA is negative  Encephalopathy improved  Given advanced age, belief empiric abx therapy in the setting of elevated ESR, CRP, Procalcitonin and encephalopathy is reasonable   De escalated to PO Augmentin completed 5/8        Cognitive decline- (present on admission)   Assessment & Plan    In the setting of parkinson disease  Back to baseline  Appreciate SLP input        Parkinson's disease (HCC)- (present on admission)   Assessment & Plan    Continue Sinemet and selegiline, home meds  Ambulating well within hospital wards  SOB / CP / Anxiety / Panic attacks / Cardiac comorbidities limiting ambulation        Seizure disorder (HCC)- (present on admission)   Assessment & Plan    No new seizures noted, continue Keppra and gabapentin at home doses  Encephalopathy, EEG negative  Encephalopathy has resolved now            Quality-Core Measures   Reviewed items::  Labs reviewed, Medications reviewed and Radiology images reviewed  Thomas catheter::  No Thomas  DVT prophylaxis pharmacological::  Contraindicated - High bleeding risk  DVT prophylaxis - mechanical:  SCDs  Assessed for rehabilitation services:  Patient was assess for and/or received rehabilitation services during this hospitalization

## 2018-05-08 NOTE — DISCHARGE PLANNING
Anticipated Discharge Disposition: Unknown at this time due to lack of supports.     Action: LSW received a phone call from Tahmina with [facility] ([phone]) who stated [facility] no longer goes to [place] for Hospice.     Barriers to Discharge: No hospice in [place]. No support at home for pt.    Plan: Continue pursuing options for safe discharge plan.

## 2018-05-08 NOTE — DISCHARGE PLANNING
Anticipated Discharge Disposition: SNF v Group home?    Action: LSW received a phone call from Charity with [facility] ([phone]) re: pt's d/c. Charity asked pt's medical status and to talk to the bedside RN. LSW gave bedside RN Charity's number.     Barriers to Discharge: No hospice in [place]. No support at home for pt.     Plan: Inquire about other options and places for pt to discharge safely to.

## 2018-05-08 NOTE — THERAPY
per chart, pt is now comfort care and looking for options to dc on hospice; will hold PT for now; please re-order if POC/dc plans change

## 2018-05-08 NOTE — DISCHARGE PLANNING
Anticipated Discharge Disposition: Group home?    Action: LSW [facility] ([phone]) to discuss discharge options for pt. Monica stated that pt is not service connected so they are unable to offer some resources. However, she stated that if the pt goes to an assisted living, the [facility] can possible help pay for it with proper documentation. Monica also stated that [facility] may go to [place]. LSW left message for Cori with [facility] ([phone]) requesting call back.     Barriers to Discharge: No hospice in [place]. No support at home for pt.    Plan: Discuss supports with pt. Awaiting call back from Cori with [facility].

## 2018-05-08 NOTE — PROGRESS NOTES
[facility] Hospitalist Progress Note    Date of Service: 5/7/2018    Chief Complaint  72 y.o. male admitted 4/30/2018 with chest pain / NSTEMI s/p PCI with hospital course c/b hospital induced delirium in the setting of parkinson's disease. Persistent concerns for ischemia post PCI. Patient transitioned to comfort measures 05/06/18 in line with patient wishes at this time    Interval Problem Update  Patient seen and evaluated on rounds  Pain improved  Feels better  Awaiting discharge home with hospice care  SW working on it  Wants his medications burden decrease, MV stopped  Discussed with nursing staff    Consultants/Specialty  Cardiology  Palliative care    Disposition  On comfort measures   He wants to discharge to his baseline residential situation in [place]  He wants to achieve above with hospice care  SW working on disposition  No hospice care where he lives  Can consider home health nursing to teach friends as he claims to educate regarding care  Meanwhile no telemetry needs  Patient can be transferred to oncology RNF while awaiting discharge         Review of Systems   Constitutional: Negative for chills, diaphoresis, fever, malaise/fatigue and weight loss.   HENT: Negative for hearing loss and tinnitus.    Eyes: Negative for blurred vision and double vision.   Respiratory: Positive for shortness of breath. Negative for cough, hemoptysis, sputum production and wheezing.    Cardiovascular: Positive for chest pain and palpitations. Negative for orthopnea, claudication, leg swelling and PND.   Gastrointestinal: Negative for abdominal pain, blood in stool, constipation, diarrhea, heartburn, melena, nausea and vomiting.   Genitourinary: Negative for dysuria, flank pain, frequency, hematuria and urgency.   Musculoskeletal: Negative for back pain, falls, joint pain, myalgias and neck pain.   Skin: Negative for itching and rash.   Neurological: Positive for tremors. Negative for dizziness, tingling, sensory change,  speech change, focal weakness, seizures, loss of consciousness, weakness and headaches.   Psychiatric/Behavioral: Negative for depression. The patient is nervous/anxious.       Physical Exam  Laboratory/Imaging   Hemodynamics  Temp (24hrs), Av.4 °C (97.5 °F), Min:36.1 °C (97 °F), Max:36.7 °C (98 °F)   Temperature: 36.7 °C (98 °F)  Pulse  Av.3  Min: 59  Max: 123   Blood Pressure : (!) 92/67 (RN notified)      Respiratory      Respiration: 18, Pulse Oximetry: 95 %        RUL Breath Sounds: Clear, RML Breath Sounds: Clear, RLL Breath Sounds: Diminished, NOA Breath Sounds: Clear, LLL Breath Sounds: Diminished    Fluids  No intake or output data in the 24 hours ending 18    Nutrition  Orders Placed This Encounter   Procedures   • Diet Order     Standing Status:   Standing     Number of Occurrences:   1     Order Specific Question:   Diet:     Answer:   Cardiac [6]     Physical Exam   Constitutional: He is oriented to person, place, and time. He appears well-developed.  Non-toxic appearance. No distress.   HENT:   Head: Normocephalic and atraumatic.   Mouth/Throat: Oropharynx is clear and moist. No oropharyngeal exudate.   Eyes: Conjunctivae are normal. Pupils are equal, round, and reactive to light. Right eye exhibits no discharge. Left eye exhibits no discharge. No scleral icterus.   Neck: Normal range of motion. No JVD present. No edema and no erythema present.   Cardiovascular: Normal rate and regular rhythm.  Exam reveals no gallop and no friction rub.    Murmur heard.  Pulmonary/Chest: Effort normal. No stridor. No respiratory distress. He has no wheezes. He has rales. He exhibits no tenderness.   Abdominal: Soft. Bowel sounds are normal. He exhibits no distension. There is no tenderness. There is no rebound.   Musculoskeletal: Normal range of motion. He exhibits edema (Minimal). He exhibits no tenderness or deformity.   Neurological: He is alert and oriented to person, place, and time. He has  normal reflexes. No cranial nerve deficit.   Skin: Skin is warm and dry. No rash noted. He is not diaphoretic. No erythema.   Psychiatric: He has a normal mood and affect. Judgment and thought content normal. He is slowed. Cognition and memory are impaired.   Anxious   Nursing note and vitals reviewed.      Recent Labs      05/05/18   0336  05/06/18   0350   WBC  7.6  8.2   RBC  3.44*  3.62*   HEMOGLOBIN  10.6*  11.2*   HEMATOCRIT  33.0*  34.3*   MCV  95.9  94.8   MCH  30.8  30.9   MCHC  32.1*  32.7*   RDW  51.6*  51.4*   PLATELETCT  235  297   MPV  10.8  10.8     Recent Labs      05/05/18   0336  05/06/18   0350   SODIUM  139  138   POTASSIUM  4.1  4.1   CHLORIDE  110  108   CO2  19*  21   GLUCOSE  132*  134*   BUN  39*  35*   CREATININE  1.11  1.07   CALCIUM  8.6  8.9         Recent Labs      05/06/18   0709   BNPBTYPENAT  1417*              Assessment/Plan     * Comfort measures only status- (present on admission)   Assessment & Plan    Discussed with patient 05/06/18  Patient transitioned to comfort measures        Chest pain- (present on admission)   Assessment & Plan    Worsening CP 05/06/18  With profound anxiety / panic attacks    Etiology: Recurrent Occlusive CAD, ? Subendocardial ischemia with Severe AS, Dressler's syndrome / others    CTA PE negative.    Discussed with Dr Patel  Per cardiology patient not good candidate for further interventions including repeat angiography / TAVR  Cardiology team at this time recommend hospice care / comfort measures  Discussed with patient 05/06/18  Patient wants to proceed with comfort measures / hospice care at this time  Tried to reach the son multiple times over the phone 05/06/18 and not successful  Patient able to his own decisions  Patient wants to be able to go back to his home    Given above patient transitioned to comfort measures  Continue current medical regimen as patient would like to do this in ability to be back home  Continue Scheduled toradol, add  pepcid  Morphine PRN   Xanax as needed for anxiety  Nasal congestion worsening anxiety / symptoms. Scheduled afrin / ocean spray  Discuss with SW regarding hospice placement at home which may be difficult given no hospice services in patient's local town        Pleural effusion- (present on admission)   Assessment & Plan    B/L   CHF related  Patient is relatively asymptomatic  Minimal O2 requirements now  High risk thoracentesis with DAPT / Aortic stenosis  Poor tolerance of aggressive Diuresis  Patient wants to proceed with comfort measures at this time  Continue PO Lasix / HF regimen  Continue clinical monitoring and optimize clinically as able        Acute hypoxemic respiratory failure (HCC)- (present on admission)   Assessment & Plan    Improved  This is 2/2 acute cardiogenic pulmonary edema POA and b/l pleural effusions in the setting of ACS / CHF  Continue PO Lasix  Unable to tolerate aggressive diuresis because of hypotension  Abx management  Wean off O2 as able  Persistent O2 needs  DVT duplex obtaiend and negative. CTA PE negative.         Acute systolic (congestive) heart failure (HCC)- (present on admission)   Assessment & Plan    Continue Lasix / Aldactone  BB / ACE-I  Appreciated cardiology recommendations  Patient wants to proceed with comfort measures and hospice care as able at his home        Severe aortic stenosis- (present on admission)   Assessment & Plan    Poor candidate for TAVR per cardiology team  Recommend hospice care  Discussed with patient 05/06/18  Patient wants to proceed with hospice / comfort measures at this time        Encephalopathy- (present on admission)   Assessment & Plan    Resolved  2/2 Hospital / ICU delirium in the setting of underlying parkinson disease  Continue Seroquel 100 mg HS while in the hospital  Continue comfort measures  Aspiration / Fall precautions  Close clinical monitoring         NSTEMI (non-ST elevated myocardial infarction) ([facility])- (present on admission)    Assessment & Plan    Noted outside facility, transferred here for follow-up care, now status post cardiac cath with stenting  Continue DAPT, BB, ACE-I, High intensity statin therapy  Now with ongoing chest pain  Symptomatic from above  Elevated troponin's  EKG revealing ischemic changes  Poor candidate for further interventions per Dr Patel  Cardiology recommended comfort measures / hospice care  Discussed with patient  Patient initiated on comfort measures 05/06/18        Anemia- (present on admission)   Assessment & Plan    Likely AOCD  Iron / MV supplementation         Leukocytosis- (present on admission)   Assessment & Plan    Resolved  Underlying pneumonia cannot be ruled out  UA is negative  Encephalopathy improved  Given advanced age, belief empiric abx therapy in the setting of elevated ESR, CRP, Procalcitonin and encephalopathy is reasonable   De escalated to PO Augmentin / Doxycycline   Stop dates in place for abx therapy        Wheezing- (present on admission)   Assessment & Plan    Resolved  Cardiac in etiology  RT protocol  Continue to monitor        Cognitive decline- (present on admission)   Assessment & Plan    In the setting of parkinson disease  Back to baseline  Appreciate SLP input        Parkinson's disease (HCC)- (present on admission)   Assessment & Plan    Continue Sinemet and selegiline, home meds  Ambulating well within hospital wards  SOB / CP / Anxiety / Panic attacks / Cardiac comorbidities limiting ambulation        Seizure disorder (HCC)- (present on admission)   Assessment & Plan    No new seizures noted, continue Keppra and gabapentin at home doses  Encephalopathy, EEG negative  Encephalopathy has resolved now            Quality-Core Measures   Reviewed items::  Labs reviewed, Medications reviewed and Radiology images reviewed  Thomas catheter::  No Thomas  DVT prophylaxis pharmacological::  Contraindicated - High bleeding risk  DVT prophylaxis - mechanical:  SCDs  Antibiotics:   Treating active infection/contamination beyond 24 hours perioperative coverage  Assessed for rehabilitation services:  Patient was assess for and/or received rehabilitation services during this hospitalization

## 2018-05-08 NOTE — PROGRESS NOTES
Received report and care assumed. Patient A&Ox 4. Pt. Reports no pain currently. Work of breathing even on room air. Discussed POC, comfort measures. Call light within reach and pt. instructed to call when in need of assistance.  Denies any other needs at this time.

## 2018-05-08 NOTE — DISCHARGE PLANNING
Anticipated Discharge Disposition: Unknown at this time due to lack of supports.     Action: LSW called Charity with [facility]  ([phone]). Charity stated that [facility] cannot accept pt on comfort care; however, Charity gave LSW name for [facility]. LSW spoke with Natalia from [facility] ([phone]) who stated they do not service pt's in [place]. Natalia gave LSW numbers to [facility] [place] and [facility] in [place]. LSW called [facility] ([phone]) and [facility] in [place] ([phone]) who both stated they do not service pt's in [place]. LSW called [facility] ([phone]) to inquire about medical staffing. [facility] stated that they can provide care in [place] up to 24 hours a day for $29/hr.      Barriers to Discharge: No services in [place], including hospice. No support at home for pt.    Plan: Continue pursuing options for safe discharge plan.

## 2018-05-08 NOTE — PALLIATIVE CARE
Palliative Care follow-up  PC RN f/u with patient at  to complete POLST. He confirmed his wishes for DNR and comfort measures with DC plan for hospice. He understands the team is working on placement location. Discussed further with SW and RN. POLST reviewed and signed by MD and original is on the chart for time of DC.    Updated: MD/RN    Plan: hospice; location TBD    Thank you for allowing Palliative Care to participate in this patient's care. Please feel free to call [phone] with any questions or concerns.

## 2018-05-08 NOTE — DISCHARGE PLANNING
"Anticipated Discharge Disposition: Home with home support and possibly hospice.     Action: LSW met with pt at bedside to discuss discharge plan. Pt is \"frustrated\" and ready to discharge. Pt believes that he \"will be fine\" on his own. Pt called Franck ([phone]) from the [facility] in [place]. Franck stated that the [facility] provides home bound meals M-F and would be able to check on the pt during the time the meals are being delivered. Pt states that he also may have a friend, Lino, who may be able to check in on him during the weekend. He does not have Lino's number but states his son, Bong, might.    Barriers to Discharge: No hospice in [place]. No support at home for pt.    Plan: Obtain Lino's number and call re: support for pt. Discuss pt at IDT.     "

## 2018-05-08 NOTE — CARE PLAN
Problem: Safety  Goal: Will remain free from injury  Outcome: PROGRESSING AS EXPECTED  Bed position low, call light within reach, treaded footwear, fall risk education    Problem: Infection  Goal: Will remain free from infection  Outcome: PROGRESSING AS EXPECTED  Hand hygiene and infection prevention education    Problem: Psychosocial Needs:  Goal: Level of anxiety will decrease  Outcome: PROGRESSING AS EXPECTED  Encourage to express feelings, relaxation techniques, activities

## 2018-05-09 LAB
EST. AVERAGE GLUCOSE BLD GHB EST-MCNC: 143 MG/DL
HBA1C MFR BLD: 6.6 % (ref 0–5.6)

## 2018-05-09 PROCEDURE — 700102 HCHG RX REV CODE 250 W/ 637 OVERRIDE(OP): Performed by: INTERNAL MEDICINE

## 2018-05-09 PROCEDURE — A9270 NON-COVERED ITEM OR SERVICE: HCPCS | Performed by: INTERNAL MEDICINE

## 2018-05-09 PROCEDURE — A9270 NON-COVERED ITEM OR SERVICE: HCPCS | Performed by: NURSE PRACTITIONER

## 2018-05-09 PROCEDURE — 700102 HCHG RX REV CODE 250 W/ 637 OVERRIDE(OP): Performed by: NURSE PRACTITIONER

## 2018-05-09 PROCEDURE — 700111 HCHG RX REV CODE 636 W/ 250 OVERRIDE (IP): Performed by: INTERNAL MEDICINE

## 2018-05-09 PROCEDURE — 770001 HCHG ROOM/CARE - MED/SURG/GYN PRIV*

## 2018-05-09 PROCEDURE — 99231 SBSQ HOSP IP/OBS SF/LOW 25: CPT | Performed by: INTERNAL MEDICINE

## 2018-05-09 PROCEDURE — 700102 HCHG RX REV CODE 250 W/ 637 OVERRIDE(OP): Mod: JG | Performed by: INTERNAL MEDICINE

## 2018-05-09 RX ADMIN — FAMOTIDINE 20 MG: 20 TABLET ORAL at 06:27

## 2018-05-09 RX ADMIN — FAMOTIDINE 20 MG: 20 TABLET ORAL at 17:38

## 2018-05-09 RX ADMIN — CARBIDOPA AND LEVODOPA 1.5 TABLET: 25; 100 TABLET ORAL at 06:13

## 2018-05-09 RX ADMIN — POTASSIUM CHLORIDE 20 MEQ: 1500 TABLET, EXTENDED RELEASE ORAL at 06:21

## 2018-05-09 RX ADMIN — OXYMETAZOLINE HYDROCHLORIDE 2 SPRAY: 5 SPRAY NASAL at 17:43

## 2018-05-09 RX ADMIN — LEVETIRACETAM 1500 MG: 500 TABLET, FILM COATED ORAL at 17:40

## 2018-05-09 RX ADMIN — KETOROLAC TROMETHAMINE 15 MG: 30 INJECTION, SOLUTION INTRAMUSCULAR at 06:08

## 2018-05-09 RX ADMIN — QUETIAPINE FUMARATE 100 MG: 25 TABLET ORAL at 17:41

## 2018-05-09 RX ADMIN — SELEGILINE HYDROCHLORIDE 5 MG: 5 TABLET ORAL at 21:04

## 2018-05-09 RX ADMIN — LIDOCAINE HYDROCHLORIDE 15 ML: 20 SOLUTION OROPHARYNGEAL at 16:56

## 2018-05-09 RX ADMIN — METOPROLOL SUCCINATE 12.5 MG: 25 TABLET, EXTENDED RELEASE ORAL at 12:05

## 2018-05-09 RX ADMIN — SPIRONOLACTONE 12.5 MG: 25 TABLET ORAL at 06:25

## 2018-05-09 RX ADMIN — GABAPENTIN 300 MG: 300 CAPSULE ORAL at 18:00

## 2018-05-09 RX ADMIN — TICAGRELOR 90 MG: 90 TABLET ORAL at 06:19

## 2018-05-09 RX ADMIN — MORPHINE SULFATE 2 MG: 4 INJECTION INTRAVENOUS at 08:24

## 2018-05-09 RX ADMIN — ATORVASTATIN CALCIUM 40 MG: 40 TABLET, FILM COATED ORAL at 17:42

## 2018-05-09 RX ADMIN — SELEGILINE HYDROCHLORIDE 5 MG: 5 TABLET ORAL at 06:27

## 2018-05-09 RX ADMIN — TICAGRELOR 90 MG: 90 TABLET ORAL at 17:40

## 2018-05-09 RX ADMIN — GABAPENTIN 300 MG: 300 CAPSULE ORAL at 06:17

## 2018-05-09 RX ADMIN — FUROSEMIDE 40 MG: 40 TABLET ORAL at 06:24

## 2018-05-09 RX ADMIN — SALINE NASAL SPRAY 2 SPRAY: 1.5 SOLUTION NASAL at 15:00

## 2018-05-09 RX ADMIN — CARBIDOPA AND LEVODOPA 1.5 TABLET: 25; 100 TABLET ORAL at 17:48

## 2018-05-09 RX ADMIN — ASPIRIN 81 MG: 81 TABLET, COATED ORAL at 06:17

## 2018-05-09 RX ADMIN — LEVETIRACETAM 1500 MG: 500 TABLET, FILM COATED ORAL at 06:15

## 2018-05-09 RX ADMIN — GABAPENTIN 300 MG: 300 CAPSULE ORAL at 15:05

## 2018-05-09 ASSESSMENT — ENCOUNTER SYMPTOMS
FALLS: 0
SHORTNESS OF BREATH: 1
DIZZINESS: 0
DOUBLE VISION: 0
CONSTIPATION: 0
NERVOUS/ANXIOUS: 1
DIARRHEA: 0
WHEEZING: 0
FLANK PAIN: 0
LOSS OF CONSCIOUSNESS: 0
PALPITATIONS: 1
FEVER: 0
HEMOPTYSIS: 0
COUGH: 0
DEPRESSION: 0
SPEECH CHANGE: 0
HEADACHES: 0
BLURRED VISION: 0
SEIZURES: 0
CLAUDICATION: 0
NAUSEA: 0
PND: 0
FOCAL WEAKNESS: 0
DIAPHORESIS: 0
CHILLS: 0
BLOOD IN STOOL: 0
VOMITING: 0
TINGLING: 0
TREMORS: 1
MYALGIAS: 0
BACK PAIN: 0
HEARTBURN: 0
SENSORY CHANGE: 0
WEIGHT LOSS: 0
WEAKNESS: 0
ABDOMINAL PAIN: 0
NECK PAIN: 0
ORTHOPNEA: 0
SPUTUM PRODUCTION: 0

## 2018-05-09 ASSESSMENT — LIFESTYLE VARIABLES: DO YOU DRINK ALCOHOL: NO

## 2018-05-09 ASSESSMENT — PAIN SCALES - GENERAL
PAINLEVEL_OUTOF10: 7
PAINLEVEL_OUTOF10: 0
PAINLEVEL_OUTOF10: 5
PAINLEVEL_OUTOF10: 0

## 2018-05-09 ASSESSMENT — COPD QUESTIONNAIRES
HAVE YOU SMOKED AT LEAST 100 CIGARETTES IN YOUR ENTIRE LIFE: NO/DON'T KNOW
DO YOU EVER COUGH UP ANY MUCUS OR PHLEGM?: NO/ONLY WITH OCCASIONAL COLDS OR INFECTIONS
DURING THE PAST 4 WEEKS HOW MUCH DID YOU FEEL SHORT OF BREATH: NONE/LITTLE OF THE TIME
COPD SCREENING SCORE: 2

## 2018-05-09 ASSESSMENT — PATIENT HEALTH QUESTIONNAIRE - PHQ9
SUM OF ALL RESPONSES TO PHQ9 QUESTIONS 1 AND 2: 0
1. LITTLE INTEREST OR PLEASURE IN DOING THINGS: NOT AT ALL

## 2018-05-09 NOTE — DISCHARGE PLANNING
I spoke with the patient to determine if he is safe to discharge home without a home aide.  He lives alone in [place], [place].  He has a son in [place] and a son in [place] and both are supportive and will come any time he should need help.  In the house he uses a cane to get around.  The [employer] delivers a lunch meal to him.  If he doesn't answer his door but should be home they will call the .  When he is home the person delivering the lunch comes in and makes sure he is doing okay and will help him if there is something he needs help with.  The local pharmacist comes to his house and puts his medications in a daily pill container so that he knows what to take and when to take it.  He drives his electric cart less than a mile to the grocery store and buys his groceries and brings them home and puts them away.  He keeps his cart in his garage, which is only a few steps from his back door.  He drives his cart to the bank and sits down with a bank person who helps him pay his bills.  He drives his cart to the post office and walks up and down the stairs to get his mail.  His best friend is a retired 's deputy who will come over any time of the day or night if he needs help.  Finally, he has a alert button that he wears around his neck.  If he should fall he would press the button and an ambulance would come.  He has an electric recliner in his home that also can stand him up from a sitting position so that he has no trouble getting out of the recliner.  He wants to go home.  He knows he has Parkinson's and that, because he is stiff, he could fall at home.  But he said that he is willing to take that risk because he so much wants to be home.  I told him that I would work on transportation home but that he probably won't be able to go home until tomorrow and he is okay with that.    10:50 AM:  Dr. Brewer asked that we call the patient's friend, the retired , but the patient accidentally  deleted his phone number.  I called the patient's son, Bong.  He said he doesn't have the friend's number.  But he did verify that the patient does have all the help in [place] that he says he has.  Bong said he saw the patient just before he was transferred to [facility].  He said he is okay with letting the patient discharge home.

## 2018-05-09 NOTE — PROGRESS NOTES
[facility] Hospitalist Progress Note    Date of Service: 5/9/2018    Chief Complaint  72 y.o. male admitted 4/30/2018 with chest pain / NSTEMI s/p PCI with hospital course c/b hospital induced delirium in the setting of parkinson's disease. Persistent concerns for ischemia post PCI. Patient transitioned to comfort measures 05/06/18 in line with patient wishes at this time    Interval Problem Update  5/8: Awaiting discharge home with hospice care (not available in [place]). SW working on it  Wants his medications burden decrease, MV stopped  Discussed with nursing staff  5/9: No good DC option for home in [place], pending group home?    Consultants/Specialty  Cardiology  Palliative care    Disposition  On comfort measures   He wants to discharge to his baseline residential situation in [place]  KEVIN working on disposition  No hospice care where he lives, mobility is an issue  Patient can be transferred to oncology RNF while awaiting discharge         Review of Systems   Constitutional: Negative for chills, diaphoresis, fever, malaise/fatigue and weight loss.   HENT: Negative for hearing loss and tinnitus.    Eyes: Negative for blurred vision and double vision.   Respiratory: Positive for shortness of breath. Negative for cough, hemoptysis, sputum production and wheezing.    Cardiovascular: Positive for chest pain and palpitations. Negative for orthopnea, claudication, leg swelling and PND.   Gastrointestinal: Negative for abdominal pain, blood in stool, constipation, diarrhea, heartburn, melena, nausea and vomiting.   Genitourinary: Negative for dysuria, flank pain, frequency, hematuria and urgency.   Musculoskeletal: Negative for back pain, falls, joint pain, myalgias and neck pain.   Skin: Negative for itching and rash.   Neurological: Positive for tremors. Negative for dizziness, tingling, sensory change, speech change, focal weakness, seizures, loss of consciousness, weakness and headaches.   Psychiatric/Behavioral:  Negative for depression. The patient is nervous/anxious.       Physical Exam  Laboratory/Imaging   Hemodynamics  Temp (24hrs), Av.2 °C (97.1 °F), Min:35.9 °C (96.6 °F), Max:36.6 °C (97.8 °F)   Temperature: 36.1 °C (96.9 °F)  Pulse  Av.5  Min: 59  Max: 123   Blood Pressure : 100/67      Respiratory      Respiration: 18, Pulse Oximetry: 95 %     Work Of Breathing / Effort: Moderate;Shallow  RUL Breath Sounds: Clear, RML Breath Sounds: Clear, RLL Breath Sounds: Diminished, NOA Breath Sounds: Clear, LLL Breath Sounds: Diminished    Fluids    Intake/Output Summary (Last 24 hours) at 18 1551  Last data filed at 18 1300   Gross per 24 hour   Intake                0 ml   Output              850 ml   Net             -850 ml       Nutrition  Orders Placed This Encounter   Procedures   • Diet Order     Standing Status:   Standing     Number of Occurrences:   1     Order Specific Question:   Diet:     Answer:   Cardiac [6]     Physical Exam   Constitutional: He is oriented to person, place, and time. He appears well-developed.  Non-toxic appearance. No distress.   HENT:   Head: Normocephalic and atraumatic.   Mouth/Throat: Oropharynx is clear and moist. No oropharyngeal exudate.   Eyes: Conjunctivae are normal. Pupils are equal, round, and reactive to light. Right eye exhibits no discharge. Left eye exhibits no discharge. No scleral icterus.   Neck: Normal range of motion. No JVD present. No edema and no erythema present.   Cardiovascular: Normal rate and regular rhythm.  Exam reveals no gallop and no friction rub.    Murmur heard.  Pulmonary/Chest: Effort normal. No stridor. No respiratory distress. He has no wheezes. He has rales. He exhibits no tenderness.   Abdominal: Soft. Bowel sounds are normal. He exhibits no distension. There is no tenderness. There is no rebound.   Musculoskeletal: Normal range of motion. He exhibits edema (Minimal). He exhibits no tenderness or deformity.   Neurological: He is  alert and oriented to person, place, and time. He has normal reflexes. No cranial nerve deficit.   Skin: Skin is warm and dry. No rash noted. He is not diaphoretic. No erythema.   Psychiatric: He has a normal mood and affect. Judgment and thought content normal. He is slowed. Cognition and memory are impaired.   Anxious   Nursing note and vitals reviewed.                               Assessment/Plan     * Comfort measures only status- (present on admission)   Assessment & Plan    Discussed with patient 05/06/18  Patient transitioned to comfort measures        Chest pain- (present on admission)   Assessment & Plan    Worsening CP 05/06/18, with profound anxiety / panic attacks in the setting of NSTEMI    Etiology: Recurrent Occlusive CAD, ? Subendocardial ischemia with Severe AS, Dressler's syndrome / others    CTA PE negative.    Discussed with Dr Patel and they feel pt not good candidate for further interventions including repeat angiography / TAVR  Cardiology team at this time recommend hospice care / comfort measures  Discussed with patient 05/06/18 and he wishes to proceed with comfort/hospice  Patient wants to proceed with comfort measures / hospice care at this time  Patient wants to be able to go back to his home    Continue Scheduled toradol, pepcid  Morphine PRN   Xanax as needed for anxiety  Nasal congestion worsening anxiety / symptoms. Scheduled afrin / ocean spray  Discuss with SW regarding hospice placement at home which may be difficult given no hospice services in patient's local town        Pleural effusion- (present on admission)   Assessment & Plan    B/L   CHF related  Patient is relatively asymptomatic  Minimal O2 requirements now  High risk thoracentesis with DAPT / Aortic stenosis  Poor tolerance of aggressive Diuresis  Patient wants to proceed with comfort measures at this time  Continue PO Lasix / HF regimen  Continue clinical monitoring and optimize clinically as able        Acute  hypoxemic respiratory failure (HCC)- (present on admission)   Assessment & Plan    Improved  This is 2/2 acute cardiogenic pulmonary edema POA and b/l pleural effusions in the setting of ACS / CHF  Continue PO Lasix  Unable to tolerate aggressive diuresis because of hypotension  Abx management  Wean off O2 as able  Persistent O2 needs  DVT duplex obtaiend and negative. CTA PE negative.         Acute systolic (congestive) heart failure (HCC)- (present on admission)   Assessment & Plan    Continue Lasix / Aldactone  BB / ACE-I  Appreciated cardiology recommendations  Patient wants to proceed with comfort measures and hospice care as able at his home        Severe aortic stenosis- (present on admission)   Assessment & Plan    Poor candidate for TAVR per cardiology team  Recommend hospice care  Discussed with patient 05/06/18  Patient wants to proceed with hospice / comfort measures at this time        Encephalopathy- (present on admission)   Assessment & Plan    Resolved  2/2 Hospital / ICU delirium in the setting of underlying parkinson disease  Continue Seroquel 100 mg HS while in the hospital  Continue comfort measures  Aspiration / Fall precautions  Close clinical monitoring         NSTEMI (non-ST elevated myocardial infarction) ([facility])- (present on admission)   Assessment & Plan    Noted outside facility, transferred here for follow-up care, now status post cardiac cath with stenting  Continue DAPT, BB, ACE-I, High intensity statin therapy  Now with ongoing chest pain  Symptomatic from above  Elevated troponin's  EKG revealing ischemic changes  Poor candidate for further interventions per Dr Patel  Cardiology recommended comfort measures / hospice care  Discussed with patient  Patient initiated on comfort measures 05/06/18        Anemia- (present on admission)   Assessment & Plan    Likely AOCD  Iron / MV supplementation         Leukocytosis- (present on admission)   Assessment & Plan    Resolved  Underlying  pneumonia cannot be ruled out  UA is negative  Encephalopathy improved  Given advanced age, belief empiric abx therapy in the setting of elevated ESR, CRP, Procalcitonin and encephalopathy is reasonable   De escalated to PO Augmentin completed 5/8        Cognitive decline- (present on admission)   Assessment & Plan    In the setting of parkinson disease  Back to baseline  Appreciate SLP input        Parkinson's disease (HCC)- (present on admission)   Assessment & Plan    Continue Sinemet and selegiline, home meds  Ambulating well within hospital wards  SOB / CP / Anxiety / Panic attacks / Cardiac comorbidities limiting ambulation        Seizure disorder (HCC)- (present on admission)   Assessment & Plan    No new seizures noted, continue Keppra and gabapentin at home doses  Encephalopathy, EEG negative  Encephalopathy has resolved now            Quality-Core Measures   Reviewed items::  Labs reviewed, Medications reviewed and Radiology images reviewed  Thomas catheter::  No Thomas  DVT prophylaxis pharmacological::  Contraindicated - High bleeding risk  DVT prophylaxis - mechanical:  SCDs  Assessed for rehabilitation services:  Patient was assess for and/or received rehabilitation services during this hospitalization

## 2018-05-09 NOTE — PROGRESS NOTES
Received report and care assumed. Patient A&Ox 4. Pt. Reports no pain, will reassess later. Work of breathing even and unlabored on room air. Discussed POC. Call light within reach and pt. instructed to call when in need of assistance.  Denies any other needs at this time.

## 2018-05-09 NOTE — DISCHARGE PLANNING
Anticipated Discharge Disposition: Unknown     Action: LÁZARO Funk assisting with exploring resources for complex d/c plan for pt with limited home and community support/resources.     Phone call to Lourdes at [facility] office [phone]. [facility] is able to provide HH RN and CNA support for comfort care pts in [place] but would only be able to visit 2-3 times per week. They cannot provide DME, medications, etc only the nursing oversight.     Lourdes is unaware of any other hospice support/services in [place].      Lourdes mentioned a [place] NP Candace Sanchez who does home visits for pt's and could possibly provide any medical oversight. Contacted this NP's office and office said NP does not do home visits.     Attempted to identify any Personal Care Services in [place]. Could not identify any. Call back to Lourdes [facility] to see if they were aware of any PCA options in [place]. Lourdes states there are not any PCA options in [place].     Barriers to Discharge: Limited services in [place] and limited support at home.    Plan: Continuing to explore resources for safe d/c plan.

## 2018-05-10 PROCEDURE — 700102 HCHG RX REV CODE 250 W/ 637 OVERRIDE(OP): Performed by: INTERNAL MEDICINE

## 2018-05-10 PROCEDURE — 700102 HCHG RX REV CODE 250 W/ 637 OVERRIDE(OP): Performed by: NURSE PRACTITIONER

## 2018-05-10 PROCEDURE — A9270 NON-COVERED ITEM OR SERVICE: HCPCS | Performed by: INTERNAL MEDICINE

## 2018-05-10 PROCEDURE — 700112 HCHG RX REV CODE 229: Performed by: INTERNAL MEDICINE

## 2018-05-10 PROCEDURE — 99232 SBSQ HOSP IP/OBS MODERATE 35: CPT | Performed by: INTERNAL MEDICINE

## 2018-05-10 PROCEDURE — A9270 NON-COVERED ITEM OR SERVICE: HCPCS | Performed by: NURSE PRACTITIONER

## 2018-05-10 PROCEDURE — 770001 HCHG ROOM/CARE - MED/SURG/GYN PRIV*

## 2018-05-10 RX ORDER — FUROSEMIDE 20 MG/1
20 TABLET ORAL
Status: DISCONTINUED | OUTPATIENT
Start: 2018-05-11 | End: 2018-05-16 | Stop reason: HOSPADM

## 2018-05-10 RX ADMIN — LEVETIRACETAM 1500 MG: 500 TABLET, FILM COATED ORAL at 17:06

## 2018-05-10 RX ADMIN — FUROSEMIDE 40 MG: 40 TABLET ORAL at 08:18

## 2018-05-10 RX ADMIN — CARBIDOPA AND LEVODOPA 1.5 TABLET: 25; 100 TABLET ORAL at 21:25

## 2018-05-10 RX ADMIN — POTASSIUM CHLORIDE 20 MEQ: 1500 TABLET, EXTENDED RELEASE ORAL at 05:53

## 2018-05-10 RX ADMIN — TICAGRELOR 90 MG: 90 TABLET ORAL at 17:07

## 2018-05-10 RX ADMIN — SALINE NASAL SPRAY 2 SPRAY: 1.5 SOLUTION NASAL at 08:24

## 2018-05-10 RX ADMIN — SELEGILINE HYDROCHLORIDE 5 MG: 5 TABLET ORAL at 17:06

## 2018-05-10 RX ADMIN — CARBIDOPA AND LEVODOPA 1.5 TABLET: 25; 100 TABLET ORAL at 17:07

## 2018-05-10 RX ADMIN — METOPROLOL SUCCINATE 12.5 MG: 25 TABLET, EXTENDED RELEASE ORAL at 17:08

## 2018-05-10 RX ADMIN — LIDOCAINE HYDROCHLORIDE 15 ML: 20 SOLUTION OROPHARYNGEAL at 05:59

## 2018-05-10 RX ADMIN — LIDOCAINE HYDROCHLORIDE 15 ML: 20 SOLUTION OROPHARYNGEAL at 17:07

## 2018-05-10 RX ADMIN — CARBIDOPA AND LEVODOPA 1.5 TABLET: 25; 100 TABLET ORAL at 06:00

## 2018-05-10 RX ADMIN — GABAPENTIN 300 MG: 300 CAPSULE ORAL at 13:11

## 2018-05-10 RX ADMIN — LEVETIRACETAM 1500 MG: 500 TABLET, FILM COATED ORAL at 05:52

## 2018-05-10 RX ADMIN — ALPRAZOLAM 1 MG: 0.5 TABLET ORAL at 21:26

## 2018-05-10 RX ADMIN — GABAPENTIN 300 MG: 300 CAPSULE ORAL at 17:07

## 2018-05-10 RX ADMIN — FAMOTIDINE 20 MG: 20 TABLET ORAL at 05:53

## 2018-05-10 RX ADMIN — FAMOTIDINE 20 MG: 20 TABLET ORAL at 17:07

## 2018-05-10 RX ADMIN — ASPIRIN 81 MG: 81 TABLET, COATED ORAL at 06:06

## 2018-05-10 RX ADMIN — SALINE NASAL SPRAY 2 SPRAY: 1.5 SOLUTION NASAL at 13:13

## 2018-05-10 RX ADMIN — SALINE NASAL SPRAY 2 SPRAY: 1.5 SOLUTION NASAL at 17:09

## 2018-05-10 RX ADMIN — GABAPENTIN 300 MG: 300 CAPSULE ORAL at 05:53

## 2018-05-10 RX ADMIN — TICAGRELOR 90 MG: 90 TABLET ORAL at 05:52

## 2018-05-10 RX ADMIN — CARBIDOPA AND LEVODOPA 1.5 TABLET: 25; 100 TABLET ORAL at 13:12

## 2018-05-10 RX ADMIN — QUETIAPINE FUMARATE 100 MG: 25 TABLET ORAL at 21:27

## 2018-05-10 RX ADMIN — SELEGILINE HYDROCHLORIDE 5 MG: 5 TABLET ORAL at 05:59

## 2018-05-10 RX ADMIN — DOCUSATE CALCIUM 240 MG: 240 CAPSULE, LIQUID FILLED ORAL at 05:53

## 2018-05-10 RX ADMIN — ATORVASTATIN CALCIUM 40 MG: 40 TABLET, FILM COATED ORAL at 17:08

## 2018-05-10 RX ADMIN — OXYMETAZOLINE HYDROCHLORIDE 2 SPRAY: 5 SPRAY NASAL at 06:00

## 2018-05-10 RX ADMIN — OXYMETAZOLINE HYDROCHLORIDE 2 SPRAY: 5 SPRAY NASAL at 17:11

## 2018-05-10 ASSESSMENT — ENCOUNTER SYMPTOMS
FALLS: 0
ABDOMINAL PAIN: 0
DIZZINESS: 0
CHILLS: 0
NECK PAIN: 0
WHEEZING: 0
HEMOPTYSIS: 0
PALPITATIONS: 1
WEIGHT LOSS: 0
FOCAL WEAKNESS: 0
DOUBLE VISION: 0
SENSORY CHANGE: 0
NAUSEA: 0
TINGLING: 0
WEAKNESS: 0
PND: 0
SPUTUM PRODUCTION: 0
NERVOUS/ANXIOUS: 1
LOSS OF CONSCIOUSNESS: 0
COUGH: 0
MYALGIAS: 0
HEARTBURN: 0
CLAUDICATION: 0
ORTHOPNEA: 0
FLANK PAIN: 0
FEVER: 0
BACK PAIN: 0
DIAPHORESIS: 0
DEPRESSION: 0
HEADACHES: 0
DIARRHEA: 0
SHORTNESS OF BREATH: 1
VOMITING: 0
SEIZURES: 0
BLOOD IN STOOL: 0
CONSTIPATION: 0
BLURRED VISION: 0
TREMORS: 1
SPEECH CHANGE: 0

## 2018-05-10 ASSESSMENT — PAIN SCALES - GENERAL
PAINLEVEL_OUTOF10: 0

## 2018-05-10 NOTE — DISCHARGE PLANNING
"Due to ongoing frustrations from pt about not being able to discharge home, this RN spoke at length with him about our concerns discharging home alone without assistance. He stated \"I have 20-30 people who I see at the post office everyday, who will help me if I need it.\" I had pt list some of these people so that we could verify with them that they can help. He stated Connie, at the bank, Liz, at the pharmacy, and Lino Burton, a retired , and his son, Bong, were all people who could check on him and help him. He stated I could call to verify with all of these people.    -I spoke with Connie, who stated \"I'm really just his , I don't actually stop by his house to see him.\" She referred to Liz, from pharmacy who she says does check on him a few times a month.  -I spoke with Liz from [facility]. She stated she does see pt every 2 weeks and fills his meds and checks on him. She did state she is concerned about his ability to care for himself and that he is not always \"rational.\" She stated, \"I know he will take handfuls of nitro when he has chest pain\" and \"I worry about him falling or doing things around the house that aren't safe.\" She recommended we follow up with [facility] assisted [facility] or personal caregivers through home health, and gave me the contact info for this.   -Pt's son Bong, agrees that it is probably not in pt's best interest to be home alone, and he agreed that if we could \"convince\" pt to go to [facility], that would be his best option.   -Lino Burton could not be reached, as he is out of the state.    I relayed this info to Vanessa Campo, , and Silva BROWN. They are going to look into getting pt help from caretakers through [facility], and then longterm plan to get pt into [facility] if/when they have bed availability.   "

## 2018-05-10 NOTE — PROGRESS NOTES
Assumed care at 0700. Bedside report received from Jennyfer. Patient's chart and MAR reviewed. Pt denies pain at this time. Pt is A & O 3, knows it is May, but unsure of the year. Patient was updated on plan of care for the day. Questions answered and concerns addressed.  Pt denies any additional needs at this time. White board updated. Call light, phone and personal belongings within reach.

## 2018-05-10 NOTE — DISCHARGE PLANNING
I spoke with Dr. Brewer about sending the patient home today.  She asked me to call someone in [place] who knows the patient to verify that he has all the help that he says he has.  I spoke with the person who is in charge of the [facility].  She said that he does get meals delivered to him Monday to Friday.  She believes that he has the help within the town of [place] that he says he has.  She just asked me to emphasize to him that he needs to call the ambulance if he needs help, not drive to the hospital in his cart like he did this last time.  I had a long talk with him about this and he said the reason he didn't call the ambulance is because he would have to pay a copay and he felt well enough to drive himself to the hospital.  I told him that if he does that next time he has trouble, he could be deemed not safe to return home because he is reluctant to call for help.  He promised that he would call for help next time.

## 2018-05-10 NOTE — DISCHARGE PLANNING
Anticipated Discharge Disposition: Home with home care aides through [facility].    Action: The SW is working on arranging the care.    Barriers to Discharge: Cooperation by the patient, although at this time he has agreed to this plan.    Plan: If [facility] home aides can be arranged by 5/11, we will arrange for transportation back to [place] for the patient.  This is a temporary plan.  The final plan would be to get the patient into the [facility] in [place], however this will not likely happen before the patient is discharged.

## 2018-05-10 NOTE — PROGRESS NOTES
Patient alert and oriented x 4. Dressing intact to groin. Bruising to left groin. Bed in lowest position and call light within reach.

## 2018-05-10 NOTE — CARE PLAN
Problem: Safety  Goal: Will remain free from falls  Outcome: PROGRESSING AS EXPECTED  Bed alarm on. Bed in lowest position and call light within reach.    Problem: Mobility  Goal: Risk for activity intolerance will decrease    Intervention: Assess and monitor signs of activity intolerance  Patient ambulates with walker and 1 assist.

## 2018-05-10 NOTE — DISCHARGE PLANNING
Anticipated Discharge Disposition: Home with Personal Care Assistance     Action: LSW left message for [facility] ([phone]) requesting call back about personal care assistance for pt. LSW called [facility] ([phone]) and discussed services offered. [facility] stated they have open beds and would need to assess the pt in order for him to come to their facility.    Barriers to Discharge: No services in [place], including hospice. No support at home for pt.    Plan: LSW to work on setting up personal care assistance for pt at home and inform pt's son, Bong, about [facility] in [place] for future placement.

## 2018-05-10 NOTE — CARE PLAN
Problem: Safety  Goal: Will remain free from injury  Outcome: PROGRESSING AS EXPECTED  Pt educated on the importance fall prevention methods, such as treaded sock and the bed alarm. Pt stated they will use the call light prior to any attempts of ambulation. Ambulatory ability assessed, treaded socks in place, bed locked and in low position, frequent trips to bathroom offered, and call light and phone within reach.    Problem: Knowledge Deficit  Goal: Knowledge of the prescribed therapeutic regimen will improve  Outcome: PROGRESSING AS EXPECTED  Pt educated regarding plan of care and medications. All questions answered.

## 2018-05-10 NOTE — PROGRESS NOTES
Patient removed IV. Refused for it to be replaced. Stated that he is leaving today. Patient dressed  himself in street clothings because he is going home. HTN medications not administered B/P 103/48.

## 2018-05-10 NOTE — PROGRESS NOTES
[facility] Hospitalist Progress Note    Date of Service: 5/10/2018    Chief Complaint  72 y.o. male admitted 4/30/2018 with chest pain / NSTEMI s/p PCI with hospital course c/b hospital induced delirium in the setting of parkinson's disease. Persistent concerns for ischemia post PCI. Patient transitioned to comfort measures 05/06/18 in line with patient wishes at this time    Interval Problem Update  5/8: Awaiting discharge home with hospice care (not available in [place]). KEVIN working on it  Wants his medications burden decrease, MV stopped  Discussed with nursing staff  5/9: No good DC option for home in [place], pending group home?  5/10: DC ACE-I/spirono due to hypotension.  Pending confirmation of community support prior to DC home w [place]    Consultants/Specialty  Cardiology  Palliative care    Disposition  On comfort measures   He wants to discharge to his baseline residential situation in [place]  KEVIN working on disposition  No hospice care where he lives, mobility is an issue  Patient can be transferred to oncology RNF while awaiting discharge         Review of Systems   Constitutional: Negative for chills, diaphoresis, fever, malaise/fatigue and weight loss.   HENT: Negative for hearing loss and tinnitus.    Eyes: Negative for blurred vision and double vision.   Respiratory: Positive for shortness of breath. Negative for cough, hemoptysis, sputum production and wheezing.    Cardiovascular: Positive for chest pain and palpitations. Negative for orthopnea, claudication, leg swelling and PND.   Gastrointestinal: Negative for abdominal pain, blood in stool, constipation, diarrhea, heartburn, melena, nausea and vomiting.   Genitourinary: Negative for dysuria, flank pain, frequency, hematuria and urgency.   Musculoskeletal: Negative for back pain, falls, joint pain, myalgias and neck pain.   Skin: Negative for itching and rash.   Neurological: Positive for tremors. Negative for dizziness, tingling, sensory  change, speech change, focal weakness, seizures, loss of consciousness, weakness and headaches.   Psychiatric/Behavioral: Negative for depression. The patient is nervous/anxious.       Physical Exam  Laboratory/Imaging   Hemodynamics  Temp (24hrs), Av.5 °C (97.7 °F), Min:36.2 °C (97.2 °F), Max:36.8 °C (98.2 °F)   Temperature: 36.5 °C (97.7 °F)  Pulse  Av.2  Min: 59  Max: 123   Blood Pressure : 105/68      Respiratory      Respiration: 20, Pulse Oximetry: 95 %     Work Of Breathing / Effort: Mild  RUL Breath Sounds: Clear, RML Breath Sounds: Clear, RLL Breath Sounds: Diminished, NOA Breath Sounds: Clear, LLL Breath Sounds: Diminished    Fluids    Intake/Output Summary (Last 24 hours) at 05/10/18 1510  Last data filed at 05/10/18 0000   Gross per 24 hour   Intake                0 ml   Output              600 ml   Net             -600 ml       Nutrition  Orders Placed This Encounter   Procedures   • Diet Order     Standing Status:   Standing     Number of Occurrences:   1     Order Specific Question:   Diet:     Answer:   Cardiac [6]     Physical Exam   Constitutional: He is oriented to person, place, and time. He appears well-developed.  Non-toxic appearance. No distress.   HENT:   Head: Normocephalic and atraumatic.   Mouth/Throat: Oropharynx is clear and moist. No oropharyngeal exudate.   Eyes: Conjunctivae are normal. Pupils are equal, round, and reactive to light. Right eye exhibits no discharge. Left eye exhibits no discharge. No scleral icterus.   Neck: Normal range of motion. No JVD present. No edema and no erythema present.   Cardiovascular: Normal rate and regular rhythm.  Exam reveals no gallop and no friction rub.    Murmur heard.  Pulmonary/Chest: Effort normal. No stridor. No respiratory distress. He has no wheezes. He has rales. He exhibits no tenderness.   Abdominal: Soft. Bowel sounds are normal. He exhibits no distension. There is no tenderness. There is no rebound.   Musculoskeletal: Normal  range of motion. He exhibits edema (Minimal). He exhibits no tenderness or deformity.   Neurological: He is alert and oriented to person, place, and time. He has normal reflexes. No cranial nerve deficit.   Skin: Skin is warm and dry. No rash noted. He is not diaphoretic. No erythema.   Psychiatric: He has a normal mood and affect. Judgment and thought content normal. He is slowed. Cognition and memory are impaired.   Anxious   Nursing note and vitals reviewed.                               Assessment/Plan     * Comfort measures only status- (present on admission)   Assessment & Plan    Discussed with patient 05/06/18  Patient transitioned to comfort measures        Chest pain- (present on admission)   Assessment & Plan    Worsening CP 05/06/18, with profound anxiety / panic attacks in the setting of NSTEMI    Etiology: Recurrent Occlusive CAD, ? Subendocardial ischemia with Severe AS, Dressler's syndrome / others    CTA PE negative.    Discussed with Dr Patel and they feel pt not good candidate for further interventions including repeat angiography / TAVR  Cardiology team at this time recommend hospice care / comfort measures  Discussed with patient 05/06/18 and he wishes to proceed with comfort/hospice  Patient wants to proceed with comfort measures / hospice care at this time  Patient wants to be able to go back to his home    Continue Scheduled toradol, pepcid  Morphine PRN   Xanax as needed for anxiety  Nasal congestion worsening anxiety / symptoms. Scheduled afrin / ocean spray  Discuss with SW regarding hospice placement at home which may be difficult given no hospice services in patient's local town        Pleural effusion- (present on admission)   Assessment & Plan    B/L   CHF related  Patient is relatively asymptomatic  Minimal O2 requirements now  High risk thoracentesis with DAPT / Aortic stenosis  Poor tolerance of aggressive Diuresis  Patient wants to proceed with comfort measures at this  time  Continue PO Lasix / HF regimen  Continue clinical monitoring and optimize clinically as able        Acute hypoxemic respiratory failure (HCC)- (present on admission)   Assessment & Plan    Improved  This is 2/2 acute cardiogenic pulmonary edema POA and b/l pleural effusions in the setting of ACS / CHF  Continue PO Lasix, reduce dose due to euvolemia and BP low normal  Wean off O2 as able  DVT duplex obtaiend and negative. CTA PE negative.   Comfort care        Acute systolic (congestive) heart failure (HCC)- (present on admission)   Assessment & Plan    Continue Lasix, lower dose  DC spironolactone (comfort care)  Continue metop  DC ACE-I (due to hypotension)  Appreciated cardiology recommendations  Patient wants to proceed with comfort measures and hospice care as able at his home        Severe aortic stenosis- (present on admission)   Assessment & Plan    Poor candidate for TAVR per cardiology team  Recommend hospice care  Discussed with patient 05/06/18  Patient wants to proceed with hospice / comfort measures at this time        Encephalopathy- (present on admission)   Assessment & Plan    Resolved  2/2 Hospital / ICU delirium in the setting of underlying parkinson disease  Continue Seroquel 100 mg HS while in the hospital  Continue comfort measures  Aspiration / Fall precautions  Close clinical monitoring         NSTEMI (non-ST elevated myocardial infarction) (HCC)- (present on admission)   Assessment & Plan    Noted outside facility, transferred here for follow-up care, now status post cardiac cath with stenting  Continue DAPT, BB, High intensity statin therapy.  Hold ACE-I due to hypotension  Has ongoing intermittent chest pain that is likely angina, not treating due to patient decision for comfort care (5/6/18)  Poor candidate for further interventions per Dr Patel        Anemia- (present on admission)   Assessment & Plan    Likely AOCD  DC supplements, comfort care        Leukocytosis- (present on  admission)   Assessment & Plan    Resolved  Underlying pneumonia cannot be ruled out  UA is negative  Encephalopathy improved  Given advanced age, belief empiric abx therapy in the setting of elevated ESR, CRP, Procalcitonin and encephalopathy is reasonable   De escalated to PO Augmentin completed 5/8        Cognitive decline- (present on admission)   Assessment & Plan    In the setting of parkinson disease  Back to baseline  Appreciate SLP input        Parkinson's disease (HCC)- (present on admission)   Assessment & Plan    Continue Sinemet and selegiline, home meds  Ambulating well within hospital wards  SOB / CP / Anxiety / Panic attacks / Cardiac comorbidities limiting ambulation        Seizure disorder (HCC)- (present on admission)   Assessment & Plan    No new seizures noted, continue Keppra and gabapentin at home doses  Encephalopathy, EEG negative  Encephalopathy has resolved now            Quality-Core Measures   Reviewed items::  Labs reviewed, Medications reviewed and Radiology images reviewed  Thomas catheter::  No Thomas  DVT prophylaxis pharmacological::  Contraindicated - High bleeding risk  DVT prophylaxis - mechanical:  SCDs  Assessed for rehabilitation services:  Patient was assess for and/or received rehabilitation services during this hospitalization

## 2018-05-11 PROBLEM — R33.9 URINARY RETENTION: Status: ACTIVE | Noted: 2018-05-11

## 2018-05-11 PROCEDURE — 700102 HCHG RX REV CODE 250 W/ 637 OVERRIDE(OP): Performed by: INTERNAL MEDICINE

## 2018-05-11 PROCEDURE — 700102 HCHG RX REV CODE 250 W/ 637 OVERRIDE(OP): Mod: JG | Performed by: INTERNAL MEDICINE

## 2018-05-11 PROCEDURE — 86480 TB TEST CELL IMMUN MEASURE: CPT

## 2018-05-11 PROCEDURE — A9270 NON-COVERED ITEM OR SERVICE: HCPCS | Performed by: INTERNAL MEDICINE

## 2018-05-11 PROCEDURE — 770001 HCHG ROOM/CARE - MED/SURG/GYN PRIV*

## 2018-05-11 PROCEDURE — 51798 US URINE CAPACITY MEASURE: CPT

## 2018-05-11 PROCEDURE — 99232 SBSQ HOSP IP/OBS MODERATE 35: CPT | Performed by: INTERNAL MEDICINE

## 2018-05-11 PROCEDURE — 700102 HCHG RX REV CODE 250 W/ 637 OVERRIDE(OP): Performed by: NURSE PRACTITIONER

## 2018-05-11 PROCEDURE — 36415 COLL VENOUS BLD VENIPUNCTURE: CPT

## 2018-05-11 PROCEDURE — A9270 NON-COVERED ITEM OR SERVICE: HCPCS | Performed by: NURSE PRACTITIONER

## 2018-05-11 RX ORDER — MORPHINE SULFATE 100 MG/5ML
10 SOLUTION ORAL
Status: DISCONTINUED | OUTPATIENT
Start: 2018-05-11 | End: 2018-05-16 | Stop reason: HOSPADM

## 2018-05-11 RX ADMIN — FUROSEMIDE 20 MG: 20 TABLET ORAL at 06:36

## 2018-05-11 RX ADMIN — ASPIRIN 81 MG: 81 TABLET, COATED ORAL at 06:36

## 2018-05-11 RX ADMIN — CARBIDOPA AND LEVODOPA 1.5 TABLET: 25; 100 TABLET ORAL at 18:18

## 2018-05-11 RX ADMIN — TICAGRELOR 90 MG: 90 TABLET ORAL at 18:19

## 2018-05-11 RX ADMIN — FAMOTIDINE 20 MG: 20 TABLET ORAL at 18:20

## 2018-05-11 RX ADMIN — QUETIAPINE FUMARATE 100 MG: 25 TABLET ORAL at 20:22

## 2018-05-11 RX ADMIN — CARBIDOPA AND LEVODOPA 1.5 TABLET: 25; 100 TABLET ORAL at 09:54

## 2018-05-11 RX ADMIN — GABAPENTIN 300 MG: 300 CAPSULE ORAL at 13:40

## 2018-05-11 RX ADMIN — CARBIDOPA AND LEVODOPA 1.5 TABLET: 25; 100 TABLET ORAL at 13:41

## 2018-05-11 RX ADMIN — LEVETIRACETAM 1500 MG: 500 TABLET, FILM COATED ORAL at 06:28

## 2018-05-11 RX ADMIN — LEVETIRACETAM 1500 MG: 500 TABLET, FILM COATED ORAL at 18:19

## 2018-05-11 RX ADMIN — GABAPENTIN 300 MG: 300 CAPSULE ORAL at 18:19

## 2018-05-11 RX ADMIN — MORPHINE SULFATE 10 MG: 100 SOLUTION ORAL at 09:45

## 2018-05-11 RX ADMIN — SELEGILINE HYDROCHLORIDE 5 MG: 5 TABLET ORAL at 06:27

## 2018-05-11 RX ADMIN — TICAGRELOR 90 MG: 90 TABLET ORAL at 06:29

## 2018-05-11 RX ADMIN — METOPROLOL SUCCINATE 12.5 MG: 25 TABLET, EXTENDED RELEASE ORAL at 18:19

## 2018-05-11 RX ADMIN — ATORVASTATIN CALCIUM 40 MG: 40 TABLET, FILM COATED ORAL at 18:20

## 2018-05-11 RX ADMIN — SELEGILINE HYDROCHLORIDE 5 MG: 5 TABLET ORAL at 18:18

## 2018-05-11 RX ADMIN — FAMOTIDINE 20 MG: 20 TABLET ORAL at 06:36

## 2018-05-11 RX ADMIN — ALPRAZOLAM 1 MG: 0.5 TABLET ORAL at 06:47

## 2018-05-11 RX ADMIN — CARBIDOPA AND LEVODOPA 1.5 TABLET: 25; 100 TABLET ORAL at 06:23

## 2018-05-11 RX ADMIN — SALINE NASAL SPRAY 2 SPRAY: 1.5 SOLUTION NASAL at 13:41

## 2018-05-11 RX ADMIN — POTASSIUM CHLORIDE 20 MEQ: 1500 TABLET, EXTENDED RELEASE ORAL at 06:34

## 2018-05-11 RX ADMIN — ACETAMINOPHEN 650 MG: 325 TABLET, FILM COATED ORAL at 23:11

## 2018-05-11 RX ADMIN — GABAPENTIN 300 MG: 300 CAPSULE ORAL at 06:31

## 2018-05-11 ASSESSMENT — ENCOUNTER SYMPTOMS
CHILLS: 0
DOUBLE VISION: 0
TREMORS: 1
ABDOMINAL PAIN: 0
SEIZURES: 0
SPEECH CHANGE: 0
FLANK PAIN: 0
BACK PAIN: 0
BLURRED VISION: 0
FOCAL WEAKNESS: 0
NAUSEA: 0
MYALGIAS: 0
NECK PAIN: 0
COUGH: 0
SPUTUM PRODUCTION: 0
DIAPHORESIS: 0
FALLS: 0
DIARRHEA: 0
WEIGHT LOSS: 0
DIZZINESS: 0
SHORTNESS OF BREATH: 1
HEARTBURN: 0
WHEEZING: 0
HEMOPTYSIS: 0
FEVER: 0
WEAKNESS: 0
LOSS OF CONSCIOUSNESS: 0
BLOOD IN STOOL: 0
ORTHOPNEA: 0
HEADACHES: 0
DEPRESSION: 0
CLAUDICATION: 0
VOMITING: 0
SENSORY CHANGE: 0
CONSTIPATION: 0
TINGLING: 0
PALPITATIONS: 1
PND: 0

## 2018-05-11 ASSESSMENT — PAIN SCALES - GENERAL
PAINLEVEL_OUTOF10: 1
PAINLEVEL_OUTOF10: 8
PAINLEVEL_OUTOF10: 0
PAINLEVEL_OUTOF10: 0
PAINLEVEL_OUTOF10: 4

## 2018-05-11 NOTE — ASSESSMENT & PLAN NOTE
750 and also postvoid in bladder (5/11). He required straight cath ×1  -Order for when necessary straight cath if PVR is greater than 350

## 2018-05-11 NOTE — PROGRESS NOTES
Assumed care at 0700. Bedside report received from Randy. Patient's chart and MAR reviewed. Pt sleeping at this time. White board updated. Call light, phone and personal belongings within reach.

## 2018-05-11 NOTE — PALLIATIVE CARE
"Palliative Care follow-up  Palliative RN met w/pt at bedside. Discussed pt's current clinical condition as he understands it to be. Addressed pt's frustration regarding his inability to discharge directly home. Reviewed safe discharge process in order to get pt home eventually, and pt admits that physically he would be incapable of participating in a rigorous rehab program, \"there's no way my breathing would let me do that...absolutely not.\" Discussed possibility of pt discharging to [facility] in [place] with an emphasis on pt being able to maintain his independence as much as possible, with help immediately available. Pt states, \"I am not interested in that at all...not one bit.\" Pt states he has a very supportive community that he lives in and \"two phones at home that I can call as soon as I think I'm having a problem.\" Pt reports \"I used to be a runner...run all day, run all night, run until the morning light...and I miss it.. I miss it.\" Pt is very tearful during this discussion. Emotional and verbal support offered. 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 Call placed to SW to update regarding this conversation and pt's wishes.       Updated: Bedside RN, KEVIN    Plan: Continue to follow for support and education.     Thank you for allowing Palliative Care to participate in this patient's care. Please feel free to call [phone] with any questions or concerns.  "

## 2018-05-11 NOTE — CARE PLAN
Problem: Infection  Goal: Will remain free from infection  Outcome: PROGRESSING AS EXPECTED  Patient will verbalize signs and symptoms of infection, interventions that can prevent infection, and when to notify a healthcare provider regarding signs and symptoms of infection.     Problem: Knowledge Deficit  Goal: Knowledge of disease process/condition, treatment plan, diagnostic tests, and medications will improve  Outcome: PROGRESSING AS EXPECTED  Knowledge of disease process, current condition, plan of care, medications, and diagnostics will improve.

## 2018-05-11 NOTE — DISCHARGE PLANNING
Anticipated Discharge Disposition: Home v [facility]    Action: During IDT rounds, pt was discussed about whether he is cognitively able to make his own decision to leave the hospital without additional support. LSW left message with [facility] ([phone]) requesting a call back re: possibility of them evaluating pt.    Barriers to Discharge: No services in [place], including hospice. No support at home for pt.    Plan: Awaiting cognitive evaluation. Inquire about pt discharging to [facility] if pt is unable to discharge home due to cognitive evaluation.

## 2018-05-11 NOTE — PROGRESS NOTES
[facility] Hospitalist Progress Note    Date of Service: 5/11/2018    Chief Complaint  72 y.o. male admitted 4/30/2018 with chest pain / NSTEMI s/p PCI with hospital course c/b hospital induced delirium in the setting of parkinson's disease. Persistent concerns for ischemia post PCI. Patient transitioned to comfort measures 05/06/18 in line with patient wishes at this time    Interval Problem Update  5/8: Awaiting discharge home with hospice care (not available in [place]). SW working on it  Wants his medications burden decrease, MV stopped  Discussed with nursing staff  5/9: No good DC option for home in [place], pending group home?  5/10: DC ACE-I/spirono due to hypotension.  Pending confirmation of community support prior to DC home w [place]  5/11: Roxanol added for CP, pt frustrated and yelling.  Psych eval placed for capacity    Consultants/Specialty  Cardiology  Palliative care    Disposition  On comfort measures   He wants to discharge to his baseline residential situation in [place]  KEVIN working on disposition  No hospice care where he lives, mobility is an issue  Patient can be transferred to oncology RNF while awaiting discharge         Review of Systems   Constitutional: Negative for chills, diaphoresis, fever, malaise/fatigue and weight loss.   HENT: Negative for hearing loss and tinnitus.    Eyes: Negative for blurred vision and double vision.   Respiratory: Positive for shortness of breath. Negative for cough, hemoptysis, sputum production and wheezing.    Cardiovascular: Positive for chest pain and palpitations. Negative for orthopnea, claudication, leg swelling and PND.   Gastrointestinal: Negative for abdominal pain, blood in stool, constipation, diarrhea, heartburn, melena, nausea and vomiting.   Genitourinary: Negative for dysuria, flank pain, frequency, hematuria and urgency.   Musculoskeletal: Negative for back pain, falls, joint pain, myalgias and neck pain.   Skin: Negative for itching and  rash.   Neurological: Positive for tremors. Negative for dizziness, tingling, sensory change, speech change, focal weakness, seizures, loss of consciousness, weakness and headaches.   Psychiatric/Behavioral: Negative for depression.      Physical Exam  Laboratory/Imaging   Hemodynamics  Temp (24hrs), Av.4 °C (97.5 °F), Min:36 °C (96.8 °F), Max:36.7 °C (98 °F)   Temperature: 36.3 °C (97.3 °F)  Pulse  Av  Min: 59  Max: 123   Blood Pressure : 109/76      Respiratory      Respiration: 12, Pulse Oximetry: 93 %     Work Of Breathing / Effort: Mild  RUL Breath Sounds: Clear, RML Breath Sounds: Clear, RLL Breath Sounds: Diminished, NOA Breath Sounds: Clear, LLL Breath Sounds: Diminished    Fluids    Intake/Output Summary (Last 24 hours) at 18 1624  Last data filed at 18 1030   Gross per 24 hour   Intake                0 ml   Output              850 ml   Net             -850 ml       Nutrition  Orders Placed This Encounter   Procedures   • Diet Order     Standing Status:   Standing     Number of Occurrences:   1     Order Specific Question:   Diet:     Answer:   Cardiac [6]     Physical Exam   Constitutional: He is oriented to person, place, and time. He appears well-developed.  Non-toxic appearance. No distress.   HENT:   Head: Normocephalic and atraumatic.   Mouth/Throat: Oropharynx is clear and moist. No oropharyngeal exudate.   Eyes: Conjunctivae are normal. Pupils are equal, round, and reactive to light. Right eye exhibits no discharge. Left eye exhibits no discharge. No scleral icterus.   Neck: Normal range of motion. No JVD present. No edema and no erythema present.   Cardiovascular: Normal rate and regular rhythm.  Exam reveals no gallop and no friction rub.    Murmur heard.  Pulmonary/Chest: Effort normal. No stridor. No respiratory distress. He has no wheezes. He has rales. He exhibits no tenderness.   Abdominal: Soft. Bowel sounds are normal. He exhibits no distension. There is no tenderness.  There is no rebound.   Musculoskeletal: Normal range of motion. He exhibits edema (Minimal). He exhibits no tenderness or deformity.   Neurological: He is alert and oriented to person, place, and time. He has normal reflexes. No cranial nerve deficit.   Skin: Skin is warm and dry. No rash noted. He is not diaphoretic. No erythema.   Psychiatric: His affect is blunt and labile. He is agitated, aggressive and slowed. Cognition and memory are impaired.   Nursing note and vitals reviewed.                               Assessment/Plan     * Comfort measures only status- (present on admission)   Assessment & Plan    Discussed with patient 05/06/18  Patient transitioned to comfort measures        Chest pain- (present on admission)   Assessment & Plan    Worsening CP 05/06/18, with profound anxiety / panic attacks in the setting of NSTEMI    Etiology: Recurrent Occlusive CAD, ? Subendocardial ischemia with Severe AS, Dressler's syndrome / others    CTA PE negative.    Discussed with Dr Patel and they feel pt not good candidate for further interventions including repeat angiography / TAVR  Cardiology team at this time recommend hospice care / comfort measures  Discussed with patient 05/06/18 and he wishes to proceed with comfort/hospice  Patient wants to proceed with comfort measures / hospice care at this time  Patient wants to be able to go back to his home    Continue Scheduled toradol, pepcid  Morphine PRN   Xanax as needed for anxiety  Nasal congestion worsening anxiety / symptoms. Scheduled afrin / ocean spray  Discuss with SW regarding hospice placement at home which may be difficult given no hospice services in patient's local town        Pleural effusion- (present on admission)   Assessment & Plan    B/L   CHF related  Patient is relatively asymptomatic  Minimal O2 requirements now  High risk thoracentesis with DAPT / Aortic stenosis  Poor tolerance of aggressive Diuresis  Patient wants to proceed with comfort  measures at this time  Continue PO Lasix / HF regimen  Continue clinical monitoring and optimize clinically as able        Acute hypoxemic respiratory failure (HCC)- (present on admission)   Assessment & Plan    Improved  This is 2/2 acute cardiogenic pulmonary edema POA and b/l pleural effusions in the setting of ACS / CHF  Continue PO Lasix, reduce dose due to euvolemia and BP low normal  Wean off O2 as able  DVT duplex obtaiend and negative. CTA PE negative.   Comfort care        Acute systolic (congestive) heart failure (HCC)- (present on admission)   Assessment & Plan    Continue Lasix, lower dose  DC spironolactone (comfort care)  Continue metop  DC ACE-I (due to hypotension)  Appreciated cardiology recommendations  Patient wants to proceed with comfort measures and hospice care as able at his home        Severe aortic stenosis- (present on admission)   Assessment & Plan    Poor candidate for TAVR per cardiology team  Recommend hospice care  Discussed with patient 05/06/18  Patient wants to proceed with hospice / comfort measures at this time        Encephalopathy- (present on admission)   Assessment & Plan    Resolved  2/2 Hospital / ICU delirium in the setting of underlying parkinson disease  Continue Seroquel 100 mg HS while in the hospital  Continue comfort measures  Aspiration / Fall precautions  Close clinical monitoring         NSTEMI (non-ST elevated myocardial infarction) (HCC)- (present on admission)   Assessment & Plan    Noted outside facility, transferred here for follow-up care, now status post cardiac cath with stenting  Continue DAPT, BB, High intensity statin therapy.  Hold ACE-I due to hypotension  Has ongoing intermittent chest pain that is likely angina, not treating due to patient decision for comfort care (5/6/18)  Poor candidate for further interventions per Dr Patel        Urinary retention   Assessment & Plan    750 and also postvoid in bladder. He required straight cath ×1  -Order  for when necessary straight cath if PVR is greater than 350        Anemia- (present on admission)   Assessment & Plan    Likely AOCD  DC supplements, comfort care        Leukocytosis- (present on admission)   Assessment & Plan    Resolved  Underlying pneumonia cannot be ruled out  UA is negative  Encephalopathy improved  Given advanced age, belief empiric abx therapy in the setting of elevated ESR, CRP, Procalcitonin and encephalopathy is reasonable   De escalated to PO Augmentin completed 5/8        Cognitive decline- (present on admission)   Assessment & Plan    In the setting of parkinson disease  Back to baseline  Appreciate SLP input        Parkinson's disease (HCC)- (present on admission)   Assessment & Plan    Continue Sinemet and selegiline, home meds  Ambulating well within hospital wards  SOB / CP / Anxiety / Panic attacks / Cardiac comorbidities limiting ambulation        Seizure disorder (HCC)- (present on admission)   Assessment & Plan    No new seizures noted, continue Keppra and gabapentin at home doses  Encephalopathy, EEG negative  Encephalopathy has resolved now            Quality-Core Measures   Reviewed items::  Labs reviewed, Medications reviewed and Radiology images reviewed  Thomas catheter::  No Thomas  DVT prophylaxis pharmacological::  Contraindicated - High bleeding risk  DVT prophylaxis - mechanical:  SCDs  Assessed for rehabilitation services:  Patient was assess for and/or received rehabilitation services during this hospitalization

## 2018-05-11 NOTE — HEART FAILURE PROGRAM
Cardiovascular Nurse Navigator () Progress Note:    Discharge plan notes still indicate that patient will need placement and that he will go under the care of hospice.    Therefore, at this time there is no heart failure follow up appointment scheduled which is appropriate.    Should pt end up discharging to somewhere other than SNF and not under the care of hospice, he will require an appointment within seven calendar days of discharge.     Hospital schedulers are here seven days a week, [phone] and can be reached at extension [phone], they will handle the seven day follow up appointment for you if you call them!    As a reminder, the HF Care Bundle/Core Measures consist of:      1. A seven calendar day HF f/u appointment on the AVS, unless patient goes to SNF, inpatient rehab, or d/c with hospice.  2. HF discharge instructions discussed with patient and on the AVS and HF patient education packet provided and discussed  3. Appropriate medications for EF <40%: ACE/ARB or reason from MD why not prescribing in a note.  4. Documentation of left ventricular systolic assessment (echo or angio) or reason from MD why this is not done or will be done outpatient in a note.    Thank you and please call with questions, Monica

## 2018-05-12 LAB
M TB TUBERC IFN-G BLD QL: NEGATIVE
M TB TUBERC IFN-G/MITOGEN IGNF BLD: -0.01
M TB TUBERC IGNF/MITOGEN IGNF CONTROL: 3.78 [IU]/ML
MITOGEN IGNF BCKGRD COR BLD-ACNC: 0.02 [IU]/ML

## 2018-05-12 PROCEDURE — 700102 HCHG RX REV CODE 250 W/ 637 OVERRIDE(OP): Performed by: INTERNAL MEDICINE

## 2018-05-12 PROCEDURE — 700102 HCHG RX REV CODE 250 W/ 637 OVERRIDE(OP): Performed by: NURSE PRACTITIONER

## 2018-05-12 PROCEDURE — A9270 NON-COVERED ITEM OR SERVICE: HCPCS | Performed by: INTERNAL MEDICINE

## 2018-05-12 PROCEDURE — 51798 US URINE CAPACITY MEASURE: CPT

## 2018-05-12 PROCEDURE — 99232 SBSQ HOSP IP/OBS MODERATE 35: CPT | Performed by: INTERNAL MEDICINE

## 2018-05-12 PROCEDURE — A9270 NON-COVERED ITEM OR SERVICE: HCPCS | Performed by: NURSE PRACTITIONER

## 2018-05-12 PROCEDURE — 770001 HCHG ROOM/CARE - MED/SURG/GYN PRIV*

## 2018-05-12 RX ADMIN — TICAGRELOR 90 MG: 90 TABLET ORAL at 17:13

## 2018-05-12 RX ADMIN — FAMOTIDINE 20 MG: 20 TABLET ORAL at 06:25

## 2018-05-12 RX ADMIN — CARBIDOPA AND LEVODOPA 1.5 TABLET: 25; 100 TABLET ORAL at 11:49

## 2018-05-12 RX ADMIN — LIDOCAINE HYDROCHLORIDE 15 ML: 20 SOLUTION OROPHARYNGEAL at 07:27

## 2018-05-12 RX ADMIN — CARBIDOPA AND LEVODOPA 1.5 TABLET: 25; 100 TABLET ORAL at 06:25

## 2018-05-12 RX ADMIN — CARBIDOPA AND LEVODOPA 1.5 TABLET: 25; 100 TABLET ORAL at 17:11

## 2018-05-12 RX ADMIN — CARBIDOPA AND LEVODOPA 1.5 TABLET: 25; 100 TABLET ORAL at 14:29

## 2018-05-12 RX ADMIN — OXYMETAZOLINE HYDROCHLORIDE 2 SPRAY: 5 SPRAY NASAL at 06:27

## 2018-05-12 RX ADMIN — SELEGILINE HYDROCHLORIDE 5 MG: 5 TABLET ORAL at 06:25

## 2018-05-12 RX ADMIN — QUETIAPINE FUMARATE 100 MG: 25 TABLET ORAL at 17:14

## 2018-05-12 RX ADMIN — GABAPENTIN 300 MG: 300 CAPSULE ORAL at 17:14

## 2018-05-12 RX ADMIN — GABAPENTIN 300 MG: 300 CAPSULE ORAL at 06:24

## 2018-05-12 RX ADMIN — FUROSEMIDE 20 MG: 20 TABLET ORAL at 06:25

## 2018-05-12 RX ADMIN — LEVETIRACETAM 1500 MG: 500 TABLET, FILM COATED ORAL at 06:26

## 2018-05-12 RX ADMIN — LEVETIRACETAM 1500 MG: 500 TABLET, FILM COATED ORAL at 17:16

## 2018-05-12 RX ADMIN — TICAGRELOR 90 MG: 90 TABLET ORAL at 06:25

## 2018-05-12 RX ADMIN — GABAPENTIN 300 MG: 300 CAPSULE ORAL at 11:48

## 2018-05-12 RX ADMIN — SALINE NASAL SPRAY 2 SPRAY: 1.5 SOLUTION NASAL at 06:27

## 2018-05-12 RX ADMIN — SELEGILINE HYDROCHLORIDE 5 MG: 5 TABLET ORAL at 17:12

## 2018-05-12 RX ADMIN — FAMOTIDINE 20 MG: 20 TABLET ORAL at 17:11

## 2018-05-12 RX ADMIN — METOPROLOL SUCCINATE 12.5 MG: 25 TABLET, EXTENDED RELEASE ORAL at 17:13

## 2018-05-12 RX ADMIN — POTASSIUM CHLORIDE 20 MEQ: 1500 TABLET, EXTENDED RELEASE ORAL at 06:26

## 2018-05-12 RX ADMIN — SALINE NASAL SPRAY 2 SPRAY: 1.5 SOLUTION NASAL at 11:49

## 2018-05-12 RX ADMIN — ASPIRIN 81 MG: 81 TABLET, COATED ORAL at 06:25

## 2018-05-12 RX ADMIN — ATORVASTATIN CALCIUM 40 MG: 40 TABLET, FILM COATED ORAL at 17:13

## 2018-05-12 ASSESSMENT — ENCOUNTER SYMPTOMS
SHORTNESS OF BREATH: 1
DIAPHORESIS: 0
VOMITING: 0
HEARTBURN: 0
SPUTUM PRODUCTION: 0
NAUSEA: 0
HEADACHES: 0
WEAKNESS: 0
BACK PAIN: 0
ORTHOPNEA: 0
FLANK PAIN: 0
LOSS OF CONSCIOUSNESS: 0
FEVER: 0
BLURRED VISION: 0
ABDOMINAL PAIN: 0
CONSTIPATION: 0
COUGH: 0
SPEECH CHANGE: 0
WEIGHT LOSS: 0
BLOOD IN STOOL: 0
NECK PAIN: 0
HEMOPTYSIS: 0
SEIZURES: 0
FOCAL WEAKNESS: 0
DIARRHEA: 0
MYALGIAS: 0
CLAUDICATION: 0
DIZZINESS: 0
CHILLS: 0
FALLS: 0
SENSORY CHANGE: 0
WHEEZING: 0
DOUBLE VISION: 0
TINGLING: 0
PND: 0
PALPITATIONS: 1
TREMORS: 1
DEPRESSION: 0

## 2018-05-12 ASSESSMENT — PAIN SCALES - GENERAL
PAINLEVEL_OUTOF10: 0

## 2018-05-12 NOTE — CARE PLAN
Problem: Communication  Goal: The ability to communicate needs accurately and effectively will improve  Outcome: PROGRESSING AS EXPECTED    Intervention: Educate patient and significant other/support system about the plan of care, procedures, treatments, medications and allow for questions  Discussed daily POC with patient. Verbalized understanding of plan.      Problem: Pain Management  Goal: Pain level will decrease to patient's comfort goal  Pt currently has no complaints of pain or discomfort. Pt is currently at comfort goal.

## 2018-05-12 NOTE — PROGRESS NOTES
Bedside report with PROMISE Hernandez   Patient laying in bed resting. A+Ox4, RA, medical status. Patient has no IV access at this time and per day RN physician aware and changed morphine to PO. Plan of care discussed. Pending psych eval and placement. Questions answered and needs met. Call bell and personal belongings within reach. Bed alarm in place. Will continue to monitor.
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
## 2018-05-12 NOTE — PROGRESS NOTES
Assumed care of patient at bedside report from NOC RN. Updated on POC. Patient currently A & O x 4; on room air; up x1 assist with a front wheel walker; without complaints of acute pain. Call light within reach. Fall precautions in place. Bed locked and in lowest position. All questions answered. No other needs indicated at this time.

## 2018-05-12 NOTE — PSYCHIATRY
From information available regarding this consult, it appears that the overall questions is, does this person have the ability (cognitively, etc) to live alone at his home in [place] if he is not going to have access to  resources. This would better be determined by a OT/Speech consult who will assess patient for ability to perform ADL's etc. Based on the results of this evaluation, if patient disagrees with the determination (for instance, if OT/speech assessment states that patient is not able to care for self, but patient disagrees with this and still wants to DC to home) then a psychiatry consult for capacity to refuse the findings of this evaluation  would absolutely be appropriate, and we recommend re-consulting Psychiatry.     Caio Perez MD   PGY-IV Psychiatry Resident

## 2018-05-12 NOTE — DISCHARGE PLANNING
Anticipated DC Disposition: Home vs. [facility]    Action: LSW left additional message for [facility] ([phone]) requesting return call regarding someone coming to evaluate pt.   Pt is also pending a psych eval for capacity.    Background: At this time, he is refusing to pay for [facility] in-home caregiving. Per hospitalist PROMISE Cheney, pt is back and forth about paying for GH and/or in-home caregivers.   [facility] has been contacted regarding assistance with pt's discharge. Per notes, the pt is not service connected but the [facility] might possibly be able to assist with paying for half-way/GH placement.   Pt is unable to DC to the SNF in [place] as they cannot take pts who are on hospice. Could look into SNF with hospice with VA assistance locally if pt is agreeable.     Per LSW Silva, pt's son has stated that he will not be able to take take care of pt.     Barriers to Discharge: No support services available to pt, questionable capacity.     Plan: Await psych evaluation regarding capacity. Await return call from [facility].     Monday, LSW to follow up with the [facility] to determine what documentation would be needed in order for the VA to assist financially with pt's placement. LSW will also ask VA if they could assist with paying for [facility] in-home services if pt refuses placement. Additionally, LSW to contact the [facility] to see if they service [place].

## 2018-05-12 NOTE — PROGRESS NOTES
[facility] Hospitalist Progress Note    Date of Service: 5/12/2018    Chief Complaint  72 y.o. male admitted 4/30/2018 with chest pain / NSTEMI s/p PCI with hospital course c/b hospital induced delirium in the setting of parkinson's disease. Persistent concerns for ischemia post PCI. Patient transitioned to comfort measures 05/06/18 in line with patient wishes at this time    Interval Problem Update  5/8: Awaiting discharge home with hospice care (not available in [place]). SW working on it  Wants his medications burden decrease, MV stopped  Discussed with nursing staff  5/9: No good DC option for home in [place], pending group home?  5/10: DC ACE-I/spirono due to hypotension.  Pending confirmation of community support prior to DC home w [place]  5/11: Roxanol added for CP, pt frustrated and yelling.  Psych eval placed for capacity.  5/12: Pending psych eval for capacity.    Consultants/Specialty  Cardiology  Palliative care    Disposition  On comfort measures   He wants to discharge to his baseline residential situation in [place]  KEVIN working on disposition  No hospice care where he lives, mobility is an issue  Patient can be transferred to oncology RNF while awaiting discharge         Review of Systems   Constitutional: Negative for chills, diaphoresis, fever, malaise/fatigue and weight loss.   HENT: Negative for hearing loss and tinnitus.    Eyes: Negative for blurred vision and double vision.   Respiratory: Positive for shortness of breath. Negative for cough, hemoptysis, sputum production and wheezing.    Cardiovascular: Positive for chest pain and palpitations. Negative for orthopnea, claudication, leg swelling and PND.   Gastrointestinal: Negative for abdominal pain, blood in stool, constipation, diarrhea, heartburn, melena, nausea and vomiting.   Genitourinary: Negative for dysuria, flank pain, frequency, hematuria and urgency.   Musculoskeletal: Negative for back pain, falls, joint pain, myalgias and neck  pain.   Skin: Negative for itching and rash.   Neurological: Positive for tremors. Negative for dizziness, tingling, sensory change, speech change, focal weakness, seizures, loss of consciousness, weakness and headaches.   Psychiatric/Behavioral: Negative for depression.      Physical Exam  Laboratory/Imaging   Hemodynamics  Temp (24hrs), Av.9 °C (96.7 °F), Min:35.9 °C (96.7 °F), Max:36 °C (96.8 °F)   Temperature: 35.9 °C (96.7 °F)  Pulse  Av.8  Min: 59  Max: 123   Blood Pressure : 105/64      Respiratory      Respiration: 20, Pulse Oximetry: 95 %     Work Of Breathing / Effort: Mild  RUL Breath Sounds: Clear, RML Breath Sounds: Diminished, RLL Breath Sounds: Diminished, NOA Breath Sounds: Clear, LLL Breath Sounds: Diminished    Fluids    Intake/Output Summary (Last 24 hours) at 18 1440  Last data filed at 18 0900   Gross per 24 hour   Intake              240 ml   Output              660 ml   Net             -420 ml       Nutrition  Orders Placed This Encounter   Procedures   • Diet Order     Standing Status:   Standing     Number of Occurrences:   1     Order Specific Question:   Diet:     Answer:   Cardiac [6]     Physical Exam   Constitutional: He is oriented to person, place, and time. He appears well-developed.  Non-toxic appearance. No distress.   HENT:   Head: Normocephalic and atraumatic.   Mouth/Throat: Oropharynx is clear and moist. No oropharyngeal exudate.   Eyes: Conjunctivae are normal. Pupils are equal, round, and reactive to light. Right eye exhibits no discharge. Left eye exhibits no discharge. No scleral icterus.   Neck: Normal range of motion. No JVD present. No edema and no erythema present.   Cardiovascular: Normal rate and regular rhythm.  Exam reveals no gallop and no friction rub.    Murmur heard.  Pulmonary/Chest: Effort normal. No stridor. No respiratory distress. He has no wheezes. He has rales. He exhibits no tenderness.   Abdominal: Soft. Bowel sounds are normal. He  exhibits no distension. There is no tenderness. There is no rebound.   Musculoskeletal: Normal range of motion. He exhibits edema (Minimal). He exhibits no tenderness or deformity.   Neurological: He is alert and oriented to person, place, and time. He has normal reflexes. No cranial nerve deficit.   Skin: Skin is warm and dry. No rash noted. He is not diaphoretic. No erythema.   Psychiatric: His affect is blunt and labile. He is agitated, aggressive and slowed. Cognition and memory are impaired.   Nursing note and vitals reviewed.                               Assessment/Plan     * Comfort measures only status- (present on admission)   Assessment & Plan    Discussed with patient 05/06/18  Patient transitioned to comfort measures        Chest pain- (present on admission)   Assessment & Plan    Worsening CP 05/06/18, with profound anxiety / panic attacks in the setting of NSTEMI    Etiology: Recurrent Occlusive CAD, ? Subendocardial ischemia with Severe AS, Dressler's syndrome / others    CTA PE negative.    Discussed with Dr Patel and they feel pt not good candidate for further interventions including repeat angiography / TAVR  Cardiology team at this time recommend hospice care / comfort measures  Discussed with patient 05/06/18 and he wishes to proceed with comfort/hospice  Patient wants to proceed with comfort measures / hospice care at this time  Patient wants to be able to go back to his home    Continue Scheduled toradol, pepcid  Morphine PRN   Xanax as needed for anxiety  Nasal congestion worsening anxiety / symptoms. Scheduled afrin / ocean spray  Discuss with SW regarding hospice placement at home which may be difficult given no hospice services in patient's local town        Pleural effusion- (present on admission)   Assessment & Plan    B/L   CHF related  Patient is relatively asymptomatic  Minimal O2 requirements now  High risk thoracentesis with DAPT / Aortic stenosis  Poor tolerance of aggressive  Diuresis  Patient wants to proceed with comfort measures at this time  Continue PO Lasix / HF regimen  Continue clinical monitoring and optimize clinically as able        Acute hypoxemic respiratory failure (HCC)- (present on admission)   Assessment & Plan    Improved  This is 2/2 acute cardiogenic pulmonary edema POA and b/l pleural effusions in the setting of ACS / CHF  Continue PO Lasix, 20mg daily  DVT duplex obtaiend and negative. CTA PE negative.   Comfort care        Acute systolic (congestive) heart failure (HCC)- (present on admission)   Assessment & Plan    Continue Lasix, lower dose  DC spironolactone (comfort care)  Continue metop  DC ACE-I (due to hypotension)  Appreciated cardiology recommendations  Patient wants to proceed with comfort measures and hospice care as able at his home        Severe aortic stenosis- (present on admission)   Assessment & Plan    Poor candidate for TAVR per cardiology team  Recommend hospice care  Discussed with patient 05/06/18  Patient wants to proceed with hospice / comfort measures at this time        Encephalopathy- (present on admission)   Assessment & Plan    Resolved  2/2 Hospital / ICU delirium in the setting of underlying parkinson disease  Continue Seroquel 100 mg HS while in the hospital  Continue comfort measures  Aspiration / Fall precautions  Close clinical monitoring         NSTEMI (non-ST elevated myocardial infarction) (HCC)- (present on admission)   Assessment & Plan    Noted outside facility, transferred here for follow-up care, now status post cardiac cath with stenting  Continue DAPT, BB, High intensity statin therapy.  Hold ACE-I due to hypotension  Has ongoing intermittent chest pain that is likely angina, not treating due to patient decision for comfort care (5/6/18)  Poor candidate for further interventions per Dr Patel        Urinary retention   Assessment & Plan    750 and also postvoid in bladder (5/11). He required straight cath ×1  -Order for  when necessary straight cath if PVR is greater than 350        Anemia- (present on admission)   Assessment & Plan    Likely AOCD  DC supplements, comfort care        Leukocytosis- (present on admission)   Assessment & Plan    Resolved  Underlying pneumonia cannot be ruled out  UA is negative  Encephalopathy improved  Given advanced age, belief empiric abx therapy in the setting of elevated ESR, CRP, Procalcitonin and encephalopathy is reasonable   De escalated to PO Augmentin completed 5/8        Cognitive decline- (present on admission)   Assessment & Plan    In the setting of parkinson disease  Back to baseline but still appears to have poor decision-making ability and unsafe discharge plan at this time  Evaluate for capacity, psych eval placed        Parkinson's disease (HCC)- (present on admission)   Assessment & Plan    Continue Sinemet and selegiline, home meds  Ambulating well within hospital wards  SOB / CP / Anxiety / Panic attacks / Cardiac comorbidities limiting ambulation        Seizure disorder (HCC)- (present on admission)   Assessment & Plan    No new seizures noted, continue Keppra and gabapentin at home doses  Encephalopathy, EEG negative  Encephalopathy has resolved now            Quality-Core Measures   Reviewed items::  Labs reviewed, Medications reviewed and Radiology images reviewed  Thomas catheter::  No Thomas  DVT prophylaxis pharmacological::  Contraindicated - High bleeding risk  DVT prophylaxis - mechanical:  SCDs  Assessed for rehabilitation services:  Patient was assess for and/or received rehabilitation services during this hospitalization

## 2018-05-13 PROCEDURE — 770001 HCHG ROOM/CARE - MED/SURG/GYN PRIV*

## 2018-05-13 PROCEDURE — 700112 HCHG RX REV CODE 229: Performed by: INTERNAL MEDICINE

## 2018-05-13 PROCEDURE — 700102 HCHG RX REV CODE 250 W/ 637 OVERRIDE(OP): Performed by: INTERNAL MEDICINE

## 2018-05-13 PROCEDURE — A9270 NON-COVERED ITEM OR SERVICE: HCPCS | Performed by: INTERNAL MEDICINE

## 2018-05-13 PROCEDURE — A9270 NON-COVERED ITEM OR SERVICE: HCPCS | Performed by: NURSE PRACTITIONER

## 2018-05-13 PROCEDURE — 700102 HCHG RX REV CODE 250 W/ 637 OVERRIDE(OP): Performed by: NURSE PRACTITIONER

## 2018-05-13 PROCEDURE — 51798 US URINE CAPACITY MEASURE: CPT

## 2018-05-13 PROCEDURE — 99231 SBSQ HOSP IP/OBS SF/LOW 25: CPT | Performed by: INTERNAL MEDICINE

## 2018-05-13 RX ADMIN — FAMOTIDINE 20 MG: 20 TABLET ORAL at 17:35

## 2018-05-13 RX ADMIN — LIDOCAINE HYDROCHLORIDE 15 ML: 20 SOLUTION OROPHARYNGEAL at 12:19

## 2018-05-13 RX ADMIN — SELEGILINE HYDROCHLORIDE 5 MG: 5 TABLET ORAL at 17:35

## 2018-05-13 RX ADMIN — GABAPENTIN 300 MG: 300 CAPSULE ORAL at 05:09

## 2018-05-13 RX ADMIN — CARBIDOPA AND LEVODOPA 1.5 TABLET: 25; 100 TABLET ORAL at 05:10

## 2018-05-13 RX ADMIN — ASPIRIN 81 MG: 81 TABLET, COATED ORAL at 05:09

## 2018-05-13 RX ADMIN — POTASSIUM CHLORIDE 20 MEQ: 1500 TABLET, EXTENDED RELEASE ORAL at 05:10

## 2018-05-13 RX ADMIN — SELEGILINE HYDROCHLORIDE 5 MG: 5 TABLET ORAL at 05:10

## 2018-05-13 RX ADMIN — TICAGRELOR 90 MG: 90 TABLET ORAL at 17:35

## 2018-05-13 RX ADMIN — LEVETIRACETAM 1500 MG: 500 TABLET, FILM COATED ORAL at 05:09

## 2018-05-13 RX ADMIN — METOPROLOL SUCCINATE 12.5 MG: 25 TABLET, EXTENDED RELEASE ORAL at 17:33

## 2018-05-13 RX ADMIN — GABAPENTIN 300 MG: 300 CAPSULE ORAL at 17:33

## 2018-05-13 RX ADMIN — CARBIDOPA AND LEVODOPA 1.5 TABLET: 25; 100 TABLET ORAL at 17:32

## 2018-05-13 RX ADMIN — FAMOTIDINE 20 MG: 20 TABLET ORAL at 05:09

## 2018-05-13 RX ADMIN — FUROSEMIDE 20 MG: 20 TABLET ORAL at 05:09

## 2018-05-13 RX ADMIN — TICAGRELOR 90 MG: 90 TABLET ORAL at 05:09

## 2018-05-13 RX ADMIN — CARBIDOPA AND LEVODOPA 1.5 TABLET: 25; 100 TABLET ORAL at 12:19

## 2018-05-13 RX ADMIN — MORPHINE SULFATE 10 MG: 100 SOLUTION ORAL at 18:26

## 2018-05-13 RX ADMIN — ALPRAZOLAM 1 MG: 0.5 TABLET ORAL at 19:11

## 2018-05-13 RX ADMIN — CARBIDOPA AND LEVODOPA 1.5 TABLET: 25; 100 TABLET ORAL at 20:58

## 2018-05-13 RX ADMIN — LEVETIRACETAM 1500 MG: 500 TABLET, FILM COATED ORAL at 17:34

## 2018-05-13 RX ADMIN — QUETIAPINE FUMARATE 100 MG: 25 TABLET ORAL at 17:32

## 2018-05-13 RX ADMIN — ALPRAZOLAM 1 MG: 0.5 TABLET ORAL at 09:03

## 2018-05-13 RX ADMIN — SALINE NASAL SPRAY 2 SPRAY: 1.5 SOLUTION NASAL at 12:19

## 2018-05-13 RX ADMIN — GABAPENTIN 300 MG: 300 CAPSULE ORAL at 12:19

## 2018-05-13 RX ADMIN — ATORVASTATIN CALCIUM 40 MG: 40 TABLET, FILM COATED ORAL at 17:34

## 2018-05-13 RX ADMIN — OXYMETAZOLINE HYDROCHLORIDE 2 SPRAY: 5 SPRAY NASAL at 17:36

## 2018-05-13 ASSESSMENT — PAIN SCALES - GENERAL
PAINLEVEL_OUTOF10: 0
PAINLEVEL_OUTOF10: 3

## 2018-05-13 ASSESSMENT — ENCOUNTER SYMPTOMS
NECK PAIN: 0
CONSTIPATION: 0
PND: 0
VOMITING: 0
SENSORY CHANGE: 0
BACK PAIN: 0
PALPITATIONS: 1
FEVER: 0
FLANK PAIN: 0
CHILLS: 0
DIARRHEA: 0
HEMOPTYSIS: 0
CLAUDICATION: 0
NERVOUS/ANXIOUS: 1
WEIGHT LOSS: 0
BLURRED VISION: 0
WEAKNESS: 0
SPUTUM PRODUCTION: 0
SHORTNESS OF BREATH: 1
MYALGIAS: 0
FOCAL WEAKNESS: 0
LOSS OF CONSCIOUSNESS: 0
HEADACHES: 0
TREMORS: 1
SPEECH CHANGE: 0
WHEEZING: 0
HEARTBURN: 0
BLOOD IN STOOL: 0
DOUBLE VISION: 0
FALLS: 0
ORTHOPNEA: 0
COUGH: 0
DIZZINESS: 0
DIAPHORESIS: 0
TINGLING: 0
ABDOMINAL PAIN: 0
SEIZURES: 0
DEPRESSION: 0
NAUSEA: 0

## 2018-05-13 NOTE — PSYCHIATRY
"    PSYCHIATRIC CONSULTATION NOTE    Reason for Admission: Chest pain and elevated troponin  Reason for Consult: Evaluation for capacity to refuse suggested treatment  Requesting Provider: Dr. Brewer   Supervising Psychiatric Attending: Dr. Dunn  Source of Information: Patient and chart    Legal Status: no hold    Chief Complaint: \"I am still stuck here for over 10 days\"    HPI :   73 yo male, found to have an elevated troponin at 3.5 in context of ongoing mid sternal chest pain. He received aspirin 324 mg ×1 as well as IV morphine 4 mg as well as Nitrostat and was transferred to [facility] on 4/30/18.  We were consulted to see if patient has capacity to live on own.  Per PM&R, pt is unlikely to have a home safe discharge, unlikely to progress and be able to tolerate 3 hours of intense therapy per day at inpatient rehabilitation.  They recommended SNF with physical therapy and occupational therapy.  Speech therapy noted he had a score of 21/30 on the MOCA however, reported that the pt utilizes strategies and routines to assist with completion of high level IADLS such as medication management and bill pay.    Patient reports that he wants to leave home, is able to understand the medical team does not want him to leave to home. He reported that he thinks they are concerned about him being able to walk well, and take care of things around the house. He reported that he feels like he has good safeguards at home, lots of furniture to hold onto, and places to rest. Patient did report there were some risks to returning home \"because I cannot walk well, I definitely have the chance of falling.\" When discussing what might happen if he falls, he reported \"well I might hit my head on a wood table, that would not be good.\" He further went on to state that he might bleed, or may even die if his fall was severe enough. Patient reports he understands the risk, but would like to get home to his activities that he enjoys. He left to " "take care of his truck, likes to make his house in order, and take care of his home. Patient was able to discuss some of his medical problems including aortic stenosis and Parkinson's disease. He was able to talk about how he used to shake more, and that he has improved on medicine. Understands that if he is off of medicine he will probably start have a bigger tremor. Patient described his aortic stenosis as \"it's like I only have a slit in my heart that lets the blood through.\" Patient feels like he may be close to death, and would like to spend his time at home doing the things he enjoys. When asked about possible benefits of skilled nursing facility, he reports that he does not know as he step up and there. After discussing the possible benefits, the patient understands but feels like he can have enough support at home to help manage. Discusses some of the things he does to help, including having neighbors check in on him, having an emergency call button around his chest, having a pharmacist, and help him put his medication into pillboxes every 2 weeks. Patient was able to appreciate risks and benefits about going home, able to discuss them, come to a decision, and show reasoning about his decisions.    On psych ROS, pt denies depressive symptoms, denies manic symptoms including extended periods of decreased need for sleep or elevated mood  and denies psychotic symptoms including AH / VH    Objective:  Blood pressure 106/59, pulse 89, temperature 36 °C (96.8 °F), resp. rate 18, height 1.778 m (5' 10\"), weight 89.7 kg (197 lb 12 oz), SpO2 98 %.      MSE :   Appearance: 72-year-old tall male, lying in bed   Behavior: calm, cooperative, pleasant, engaged. good eye contact.  No tics. No mannerisms.   Speech : normal rate, normal volume, normal tone   Language: normal vocabulary   Mood: \"Okay\"   Affect: euthymic and mood congruent   Thought Process: linear, coherent, goal-directed. No flight of ideas.  No loose " associations, at times a positive thing of response   Thought Content: no suicidal or homicidal ideation and no auditory or visual hallucinations    Attention/Concentration: Some impairment but Grossly intact, able to maintain conversation well, at times have a lung Brown before answering   Orientation: oriented to person, place, situation, month, year, knew it was Mother's Day   Neurological: Deferred   Fund of Knowledge: appropriate   Insight/Judgment: fair / fair        Past Psych Hx:    Patient denies past psychiatric history                                Family Psych Hx:   Denies     Social Hx:   This at home in a house, has many neighbors and friends to check up on him. Was a Marine for a long time.    Social History     Social History   • Marital status: Single     Spouse name: N/A   • Number of children: N/A   • Years of education: N/A     Occupational History   • Not on file.     Social History Main Topics   • Smoking status: Never Smoker   • Smokeless tobacco: Not on file   • Alcohol use No   • Drug use: No   • Sexual activity: Not on file     Other Topics Concern   • Not on file     Social History Narrative    ** Merged History Encounter **              Drugs/Alcohol Hx:   Denies                        MEDICAL Hx: labs, MARS, medications, etc were reviewed. Findings of potential interest to psychiatry are noted below:    Past Medical History:   Diagnosis Date   • colon Cancer     surgery 8/2006, now in remission, testicular   • Hypertension    • Infectious disease     had shingles December 2013   • Parkinson's disease    • Seizure disorder (CMS-HCC)     last one ? 2010, from head injury   • Testicular cancer (CMS-HCC)          No results found for this or any previous visit (from the past 48 hour(s)).      Medications:   Current Facility-Administered Medications   Medication Dose Route Frequency Provider Last Rate Last Dose   • morphine (ROXANOL) 20 MG/ML oral conc 10 mg  10 mg Oral Q2HRS PRN Chantel BANUELOS  LARA BrewerOWayne   10 mg at 05/11/18 0945   • furosemide (LASIX) tablet 20 mg  20 mg Oral Q DAY Chantel Brewer D.O.   20 mg at 05/13/18 0509   • pneumococcal vaccine (PNEUMOVAX-23) injection 25 mcg  0.5 mL Intramuscular Once PRN Chantel Brewer D.O.       • morphine (pf) 4 mg/ml injection 2 mg  2 mg Intravenous Q30 MIN PRN Tessa Cowan M.D.   2 mg at 05/09/18 0824   • famotidine (PEPCID) tablet 20 mg  20 mg Oral BID Tessa Cowan M.D.   20 mg at 05/13/18 0509   • sodium chloride (OCEAN) 0.65 % nasal spray 2 Spray  2 Spray Nasal TID Tessa Cowan M.D.   2 Springer at 05/13/18 1219   • oxymetazoline (AFRIN) 0.05 % nasal spray 2 Spray  2 Spray Nasal BID Tessa Cowan M.D.   2 Springer at 05/12/18 0627   • fentaNYL (SUBLIMAZE) injection 25 mcg  25 mcg Intravenous Q HOUR PRN Tessa Cowan M.D.       • ALPRAZolam (XANAX) tablet 1 mg  1 mg Oral Q4HRS PRN Tessa Cowan M.D.   1 mg at 05/13/18 0903   • hyoscyamine-maalox plus-lidocaine viscous (GI COCKTAIL) oral susp 15 mL  15 mL Oral TID Tessa Cowan M.D.   15 mL at 05/13/18 1219   • metoprolol SR (TOPROL XL) tablet 12.5 mg  12.5 mg Oral Q EVENING Mercedes Veliz, A.P.N.   12.5 mg at 05/12/18 1713   • ipratropium-albuterol (DUONEB) nebulizer solution  3 mL Nebulization Q4H PRN (RT) Tessa Cowan M.D.       • potassium chloride SA (Kdur) tablet 20 mEq  20 mEq Oral DAILY Tessa Cowan M.D.   20 mEq at 05/13/18 0510   • QUEtiapine (SEROQUEL) tablet 100 mg  100 mg Oral Q EVENING Tessa Cowan M.D.   100 mg at 05/12/18 1714   • aspirin EC (ECOTRIN) tablet 81 mg  81 mg Oral DAILY Evert Cantu M.D.   81 mg at 05/13/18 0509   • ticagrelor (BRILINTA) tablet 90 mg  90 mg Oral BID Perry Patel M.D.   90 mg at 05/13/18 0509   • carbidopa-levodopa (SINEMET)  MG tablet 1.5 Tab  1.5 Tab Oral 4X/DAY Tima Carter M.D.   1.5 Tab at 05/13/18 1219   • docusate calcium (SURFAK) capsule 240 mg  240 mg Oral BID Tima Carter M.D.   Stopped at 05/10/18 1800   • gabapentin (NEURONTIN)  "capsule 300 mg  300 mg Oral TID Tima Carter M.D.   300 mg at 18 1219   • levETIRAcetam (KEPPRA) tablet 1,500 mg  1,500 mg Oral BID Tima Carter M.D.   1,500 mg at 18 0509   • meclizine (ANTIVERT) tablet 12.5 mg  12.5 mg Oral TID PRN Tima Carter M.D.       • selegiline (ELDEPRYL) tablet 5 mg  5 mg Oral BID Tima Carter M.D.   5 mg at 18 0510   • Respiratory Care per Protocol   Nebulization Continuous RT Tima Carter M.D.       • acetaminophen (TYLENOL) tablet 650 mg  650 mg Oral Q6HRS PRN Tima Carter M.D.   650 mg at 18 2311   • atorvastatin (LIPITOR) tablet 40 mg  40 mg Oral Q EVENING Tima Carter M.D.   40 mg at 18 1713       Allergies: Nkda [no known drug allergy]    Imaging:   CT Head 18 - Impression:  \"1.  No acute intracranial abnormality is identified, there are changes of small vessel ischemic disease with mild atrophy noted. Overall stable since prior study.  2.  Atherosclerosis.\"    EK18 - qtc 513    Medical Review of Systems: as reported by pt. All systems reviewed. Only those found to be + are noted below. All others are negative.   Neurological:    TBIs: Denies current symptoms   Szs: Denies current symptoms   Strokes: Denies current symptoms   Other: None  Other medical symptoms:   Thyroid: Denies current symptoms   Diabetes: Denies current symptoms   Cardiovascular disease: endorses shortness of breath, no current chest pain       Assessment:  Patient is a 70-year-old male with severe stenosis, congestive heart failure, Parkinson's disease. Psychiatry was evaluated for capacity, as patient wants to return home despite significant risks. Today patient was able to discuss risks and benefits, appreciate the seriousness of some of the risks. Able to discuss why he wants to return home despite these risks. Showed ability to reason about risks and benefits of his decisions. Patient was also able to discuss his medical problems " including Parkinson's disease and aortic stenosis, and the benefits he receives from his medications. At this time patient appears to have capacity to make medical decisions, and capacity to leave AMA. Primary team should continue to encourage to discuss safe and appropriate options for discharge for patient.    DDX:  Parkinson's disease with some neurocognitive decline  (MoCA 21/30)  Congestive heart failure  Severe aortic stenosis      Medical: as noted under Medical Hx and by Medical Team     Disposition:     Disposition per patient and medical team, patient appears to be willing in part to discuss options, but strongly wants to return to home as soon as possible. I encouraged patient to continue discussion with medical team to make the best decision possible while respecting his autonomy.               Patient does appear to have capacity to make medical decisions and leave AMA     Signing off      Thank you for the consult.       Discussed assessment and plan with the attending physician

## 2018-05-13 NOTE — PROGRESS NOTES
[facility] Hospitalist Progress Note    Date of Service: 5/13/2018    Chief Complaint  72 y.o. male admitted 4/30/2018 with chest pain / NSTEMI s/p PCI with hospital course c/b hospital induced delirium in the setting of parkinson's disease. Persistent concerns for ischemia post PCI. Patient transitioned to comfort measures 05/06/18 in line with patient wishes at this time    Interval Problem Update  5/8: Awaiting discharge home with hospice care (not available in [place]). SW working on it  Wants his medications burden decrease, MV stopped  Discussed with nursing staff  5/9: No good DC option for home in [place], pending group home?  5/10: DC ACE-I/spirono due to hypotension.  Pending confirmation of community support prior to DC home w [place]  5/11: Roxanol added for CP, pt frustrated and yelling.  Psych eval placed for capacity.  5/12: Pending psych eval for capacity.  5/13: Waiting for psych/capacity.  Difficult dispo    Consultants/Specialty  Cardiology  Palliative care    Disposition  On comfort measures   He wants to discharge to previous residential situation in [place], does not have mental capacity to live alone.  Unable to confirm any caregivers  SW working on disposition  No hospice care where he lives, mobility is an issue  Patient can be transferred to oncology RNF while awaiting discharge         Review of Systems   Constitutional: Negative for chills, diaphoresis, fever, malaise/fatigue and weight loss.   HENT: Negative for hearing loss and tinnitus.    Eyes: Negative for blurred vision and double vision.   Respiratory: Positive for shortness of breath. Negative for cough, hemoptysis, sputum production and wheezing.    Cardiovascular: Positive for chest pain and palpitations. Negative for orthopnea, claudication, leg swelling and PND.   Gastrointestinal: Negative for abdominal pain, blood in stool, constipation, diarrhea, heartburn, melena, nausea and vomiting.   Genitourinary: Negative for dysuria,  flank pain, frequency, hematuria and urgency.   Musculoskeletal: Negative for back pain, falls, joint pain, myalgias and neck pain.   Skin: Negative for itching and rash.   Neurological: Positive for tremors. Negative for dizziness, tingling, sensory change, speech change, focal weakness, seizures, loss of consciousness, weakness and headaches.   Psychiatric/Behavioral: Negative for depression. The patient is nervous/anxious (Anxious to leave).       Physical Exam  Laboratory/Imaging   Hemodynamics  Temp (24hrs), Av °C (96.8 °F), Min:35.9 °C (96.7 °F), Max:36.1 °C (96.9 °F)   Temperature: 36 °C (96.8 °F)  Pulse  Av.7  Min: 59  Max: 123   Blood Pressure : 106/59      Respiratory      Respiration: 18, Pulse Oximetry: 98 %     Work Of Breathing / Effort: Mild  RUL Breath Sounds: Clear, RML Breath Sounds: Diminished, RLL Breath Sounds: Diminished, NOA Breath Sounds: Clear, LLL Breath Sounds: Diminished    Fluids    Intake/Output Summary (Last 24 hours) at 18 1440  Last data filed at 18 1300   Gross per 24 hour   Intake              120 ml   Output             1075 ml   Net             -955 ml       Nutrition  Orders Placed This Encounter   Procedures   • Diet Order     Standing Status:   Standing     Number of Occurrences:   1     Order Specific Question:   Diet:     Answer:   Cardiac [6]     Physical Exam   Constitutional: He is oriented to person, place, and time. He appears well-developed.  Non-toxic appearance. No distress.   HENT:   Head: Normocephalic and atraumatic.   Mouth/Throat: Oropharynx is clear and moist. No oropharyngeal exudate.   Eyes: Conjunctivae are normal. Pupils are equal, round, and reactive to light. Right eye exhibits no discharge. Left eye exhibits no discharge. No scleral icterus.   Neck: Normal range of motion. No JVD present. No edema and no erythema present.   Cardiovascular: Normal rate and regular rhythm.  Exam reveals no gallop and no friction rub.    Murmur  heard.  Pulmonary/Chest: Effort normal. No stridor. No respiratory distress. He has no wheezes. He has rales. He exhibits no tenderness.   Abdominal: Soft. Bowel sounds are normal. He exhibits no distension. There is no tenderness. There is no rebound.   Musculoskeletal: Normal range of motion. He exhibits edema (Minimal). He exhibits no tenderness or deformity.   Neurological: He is alert and oriented to person, place, and time. He has normal reflexes. No cranial nerve deficit.   Skin: Skin is warm and dry. No rash noted. He is not diaphoretic. No erythema.   Psychiatric: His affect is blunt and labile. He is agitated, aggressive and slowed. Cognition and memory are impaired.   Nursing note and vitals reviewed.                               Assessment/Plan     * Comfort measures only status- (present on admission)   Assessment & Plan    Discussed with patient 05/06/18  Patient transitioned to comfort measures        Chest pain- (present on admission)   Assessment & Plan    Worsening CP 05/06/18, with profound anxiety / panic attacks in the setting of NSTEMI    Etiology: Recurrent Occlusive CAD, ? Subendocardial ischemia with Severe AS, Dressler's syndrome / others    CTA PE negative.    Discussed with Dr Patel and they feel pt not good candidate for further interventions including repeat angiography / TAVR  Cardiology team at this time recommend hospice care / comfort measures  Discussed with patient 05/06/18 and he wishes to proceed with comfort/hospice  Patient wants to proceed with comfort measures / hospice care at this time  Patient wants to be able to go back to his home    Continue Scheduled toradol, pepcid  Morphine PRN   Xanax as needed for anxiety  Nasal congestion worsening anxiety / symptoms. Scheduled afrin / ocean spray  Discuss with KEVIN regarding hospice placement at home which may be difficult given no hospice services in patient's local town        Pleural effusion- (present on admission)    Assessment & Plan    B/L   CHF related  Patient is relatively asymptomatic  Minimal O2 requirements now  High risk thoracentesis with DAPT / Aortic stenosis  Poor tolerance of aggressive Diuresis  Patient wants to proceed with comfort measures at this time  Continue PO Lasix / HF regimen  Continue clinical monitoring and optimize clinically as able        Acute hypoxemic respiratory failure (HCC)- (present on admission)   Assessment & Plan    Improved  This is 2/2 acute cardiogenic pulmonary edema POA and b/l pleural effusions in the setting of ACS / CHF  Continue PO Lasix, 20mg daily  DVT duplex obtaiend and negative. CTA PE negative.   Comfort care        Acute systolic (congestive) heart failure (HCC)- (present on admission)   Assessment & Plan    Continue Lasix, lower dose  DC spironolactone (comfort care)  Continue metop  DC ACE-I (due to hypotension)  Appreciated cardiology recommendations  Patient wants to proceed with comfort measures and hospice care as able at his home        Severe aortic stenosis- (present on admission)   Assessment & Plan    Poor candidate for TAVR per cardiology team  Recommend hospice care  Discussed with patient 05/06/18  Patient wants to proceed with hospice / comfort measures at this time        Encephalopathy- (present on admission)   Assessment & Plan    Resolved  2/2 Hospital / ICU delirium in the setting of underlying parkinson disease  Continue Seroquel 100 mg HS while in the hospital  Continue comfort measures  Aspiration / Fall precautions  Close clinical monitoring         NSTEMI (non-ST elevated myocardial infarction) (HCC)- (present on admission)   Assessment & Plan    Noted outside facility, transferred here for follow-up care, now status post cardiac cath with stenting  Continue DAPT, BB, High intensity statin therapy.  Hold ACE-I due to hypotension  Has ongoing intermittent chest pain that is likely angina, not treating due to patient decision for comfort care  (5/6/18)  Poor candidate for further interventions per Dr Patel        Urinary retention   Assessment & Plan    750 and also postvoid in bladder (5/11). He required straight cath ×1  -Order for when necessary straight cath if PVR is greater than 350        Anemia- (present on admission)   Assessment & Plan    Likely AOCD  DC supplements, comfort care        Leukocytosis- (present on admission)   Assessment & Plan    Resolved  Underlying pneumonia cannot be ruled out  UA is negative  Encephalopathy improved  Given advanced age, belief empiric abx therapy in the setting of elevated ESR, CRP, Procalcitonin and encephalopathy is reasonable   De escalated to PO Augmentin completed 5/8        Cognitive decline- (present on admission)   Assessment & Plan    In the setting of parkinson disease  Back to baseline but still appears to have poor decision-making ability and unsafe discharge plan at this time  Evaluate for capacity, psych eval placed        Parkinson's disease (HCC)- (present on admission)   Assessment & Plan    Continue Sinemet and selegiline, home meds  Ambulating well within hospital wards  SOB / CP / Anxiety / Panic attacks / Cardiac comorbidities limiting ambulation        Seizure disorder (HCC)- (present on admission)   Assessment & Plan    No new seizures noted, continue Keppra and gabapentin at home doses  Encephalopathy, EEG negative  Encephalopathy has resolved now            Quality-Core Measures   Reviewed items::  Labs reviewed, Medications reviewed and Radiology images reviewed  Thomas catheter::  No Thomas  DVT prophylaxis pharmacological::  Contraindicated - High bleeding risk  DVT prophylaxis - mechanical:  SCDs  Assessed for rehabilitation services:  Patient was assess for and/or received rehabilitation services during this hospitalization

## 2018-05-13 NOTE — CARE PLAN
Problem: Safety  Goal: Will remain free from injury  Outcome: PROGRESSING AS EXPECTED  Pt educated regarding fall risk and safety, pt verbalizes understanding and calls appropriately for assistance. Bed is locked and in lowest position, safety maintained throughout shift.     Problem: Knowledge Deficit  Goal: Knowledge of disease process/condition, treatment plan, diagnostic tests, and medications will improve  Outcome: PROGRESSING AS EXPECTED  Pt educated regarding plan of care, pt is knowledgeable and involved in plan, verbalizes understanding.

## 2018-05-13 NOTE — PROGRESS NOTES
Patient straight cathed per standing order, bladder scan result 467 mL. Approximately 425 mL concentrated yellow urine obtained, pt tolerated well.

## 2018-05-13 NOTE — PROGRESS NOTES
Patient A+Ox3, anxious and restless. Wants to go home. Xanax with good effect. Bed alarm on, bed locked and in lowest position. Using call bell approp. Whiteboard updated with RN and CNA numbers. Hourly rounding in place

## 2018-05-13 NOTE — CARE PLAN
Problem: Communication  Goal: The ability to communicate needs accurately and effectively will improve  Outcome: MET Date Met: 05/13/18  Patient uses call bell approp, english speaking A+Ox4.

## 2018-05-13 NOTE — DISCHARGE PLANNING
Medical Social Work     Anticipated Discharge Disposition: Home vs [facility]     Action: Per IDT rounds, pt may be lacking capacity. Psych was consulted. Per psych note, they are deferring to SLP for cog eval. Attending MD specifically wants psych to eval for capacity, so they will be re-consulted.     Barriers: Awaiting determination of pt's capacity. Lack of outside support in [place].     Plan: Unit SW to [facility].

## 2018-05-13 NOTE — PROGRESS NOTES
Patient attempting to void, unable even with standing. Bladder scanned, 560. Straight cath'd for 500 mls urine. Patient reports relief

## 2018-05-13 NOTE — CARE PLAN
Problem: Safety  Goal: Will remain free from injury  Outcome: MET Date Met: 05/13/18  Pt calls approp for needs, A+Ox4, english speaking, uses call bell

## 2018-05-14 PROCEDURE — A9270 NON-COVERED ITEM OR SERVICE: HCPCS | Performed by: INTERNAL MEDICINE

## 2018-05-14 PROCEDURE — 700102 HCHG RX REV CODE 250 W/ 637 OVERRIDE(OP): Performed by: NURSE PRACTITIONER

## 2018-05-14 PROCEDURE — 700102 HCHG RX REV CODE 250 W/ 637 OVERRIDE(OP): Mod: JG | Performed by: INTERNAL MEDICINE

## 2018-05-14 PROCEDURE — 700112 HCHG RX REV CODE 229: Performed by: INTERNAL MEDICINE

## 2018-05-14 PROCEDURE — A9270 NON-COVERED ITEM OR SERVICE: HCPCS | Performed by: NURSE PRACTITIONER

## 2018-05-14 PROCEDURE — 700102 HCHG RX REV CODE 250 W/ 637 OVERRIDE(OP): Performed by: INTERNAL MEDICINE

## 2018-05-14 PROCEDURE — 51798 US URINE CAPACITY MEASURE: CPT

## 2018-05-14 PROCEDURE — 99232 SBSQ HOSP IP/OBS MODERATE 35: CPT | Performed by: INTERNAL MEDICINE

## 2018-05-14 PROCEDURE — 770001 HCHG ROOM/CARE - MED/SURG/GYN PRIV*

## 2018-05-14 RX ORDER — NITROGLYCERIN 0.4 MG/1
0.4 TABLET SUBLINGUAL PRN
Qty: 25 TAB | Refills: 0 | Status: SHIPPED | OUTPATIENT
Start: 2018-05-14

## 2018-05-14 RX ORDER — MORPHINE SULFATE 100 MG/5ML
10 SOLUTION ORAL
Qty: 30 ML | Refills: 0 | Status: SHIPPED | OUTPATIENT
Start: 2018-05-14 | End: 2018-05-21

## 2018-05-14 RX ORDER — SPIRONOLACTONE 25 MG/1
12.5 TABLET ORAL DAILY
Qty: 30 TAB | Refills: 3 | Status: SHIPPED | OUTPATIENT
Start: 2018-05-14

## 2018-05-14 RX ORDER — FUROSEMIDE 20 MG/1
20 TABLET ORAL DAILY
Qty: 30 TAB | Refills: 3 | Status: SHIPPED | OUTPATIENT
Start: 2018-05-14

## 2018-05-14 RX ORDER — FAMOTIDINE 20 MG/1
20 TABLET, FILM COATED ORAL 2 TIMES DAILY
Qty: 60 TAB | Refills: 2 | Status: SHIPPED | OUTPATIENT
Start: 2018-05-14

## 2018-05-14 RX ADMIN — LIDOCAINE HYDROCHLORIDE 15 ML: 20 SOLUTION OROPHARYNGEAL at 11:37

## 2018-05-14 RX ADMIN — METOPROLOL SUCCINATE 12.5 MG: 25 TABLET, EXTENDED RELEASE ORAL at 16:48

## 2018-05-14 RX ADMIN — SELEGILINE HYDROCHLORIDE 5 MG: 5 TABLET ORAL at 16:45

## 2018-05-14 RX ADMIN — LEVETIRACETAM 1500 MG: 500 TABLET, FILM COATED ORAL at 06:04

## 2018-05-14 RX ADMIN — ATORVASTATIN CALCIUM 40 MG: 40 TABLET, FILM COATED ORAL at 16:48

## 2018-05-14 RX ADMIN — POTASSIUM CHLORIDE 20 MEQ: 1500 TABLET, EXTENDED RELEASE ORAL at 05:22

## 2018-05-14 RX ADMIN — TICAGRELOR 90 MG: 90 TABLET ORAL at 16:47

## 2018-05-14 RX ADMIN — MORPHINE SULFATE 10 MG: 100 SOLUTION ORAL at 13:54

## 2018-05-14 RX ADMIN — ASPIRIN 81 MG: 81 TABLET, COATED ORAL at 05:22

## 2018-05-14 RX ADMIN — FUROSEMIDE 20 MG: 20 TABLET ORAL at 05:21

## 2018-05-14 RX ADMIN — LEVETIRACETAM 1500 MG: 500 TABLET, FILM COATED ORAL at 16:47

## 2018-05-14 RX ADMIN — CARBIDOPA AND LEVODOPA 1.5 TABLET: 25; 100 TABLET ORAL at 20:27

## 2018-05-14 RX ADMIN — CARBIDOPA AND LEVODOPA 1.5 TABLET: 25; 100 TABLET ORAL at 05:22

## 2018-05-14 RX ADMIN — TICAGRELOR 90 MG: 90 TABLET ORAL at 05:22

## 2018-05-14 RX ADMIN — OXYMETAZOLINE HYDROCHLORIDE 2 SPRAY: 5 SPRAY NASAL at 05:21

## 2018-05-14 RX ADMIN — ALPRAZOLAM 1 MG: 0.5 TABLET ORAL at 16:48

## 2018-05-14 RX ADMIN — DOCUSATE CALCIUM 240 MG: 240 CAPSULE, LIQUID FILLED ORAL at 16:46

## 2018-05-14 RX ADMIN — CARBIDOPA AND LEVODOPA 1.5 TABLET: 25; 100 TABLET ORAL at 16:45

## 2018-05-14 RX ADMIN — SELEGILINE HYDROCHLORIDE 5 MG: 5 TABLET ORAL at 05:21

## 2018-05-14 RX ADMIN — OXYMETAZOLINE HYDROCHLORIDE 2 SPRAY: 5 SPRAY NASAL at 16:49

## 2018-05-14 RX ADMIN — GABAPENTIN 300 MG: 300 CAPSULE ORAL at 11:38

## 2018-05-14 RX ADMIN — GABAPENTIN 300 MG: 300 CAPSULE ORAL at 16:45

## 2018-05-14 RX ADMIN — SALINE NASAL SPRAY 2 SPRAY: 1.5 SOLUTION NASAL at 16:49

## 2018-05-14 RX ADMIN — FAMOTIDINE 20 MG: 20 TABLET ORAL at 16:48

## 2018-05-14 RX ADMIN — CARBIDOPA AND LEVODOPA 1.5 TABLET: 25; 100 TABLET ORAL at 11:38

## 2018-05-14 RX ADMIN — SALINE NASAL SPRAY 2 SPRAY: 1.5 SOLUTION NASAL at 06:00

## 2018-05-14 RX ADMIN — LIDOCAINE HYDROCHLORIDE 15 ML: 20 SOLUTION OROPHARYNGEAL at 05:27

## 2018-05-14 RX ADMIN — FAMOTIDINE 20 MG: 20 TABLET ORAL at 05:22

## 2018-05-14 RX ADMIN — GABAPENTIN 300 MG: 300 CAPSULE ORAL at 05:21

## 2018-05-14 ASSESSMENT — ENCOUNTER SYMPTOMS
SEIZURES: 0
WEIGHT LOSS: 0
BACK PAIN: 0
TREMORS: 0
SENSORY CHANGE: 0
MYALGIAS: 0
DOUBLE VISION: 0
PND: 0
LOSS OF CONSCIOUSNESS: 0
HEARTBURN: 0
ABDOMINAL PAIN: 0
FEVER: 0
DEPRESSION: 0
NERVOUS/ANXIOUS: 1
PALPITATIONS: 1
NAUSEA: 0
VOMITING: 0
DIAPHORESIS: 0
WEAKNESS: 0
FOCAL WEAKNESS: 0
HEMOPTYSIS: 0
SHORTNESS OF BREATH: 1
BLOOD IN STOOL: 0
CLAUDICATION: 0
FLANK PAIN: 0
SPEECH CHANGE: 0
CONSTIPATION: 0
HEADACHES: 0
ORTHOPNEA: 0
WHEEZING: 0
DIZZINESS: 0
COUGH: 0
FALLS: 0
SPUTUM PRODUCTION: 0
CHILLS: 0
DIARRHEA: 0
BLURRED VISION: 0
NECK PAIN: 0
TINGLING: 0

## 2018-05-14 ASSESSMENT — PAIN SCALES - GENERAL
PAINLEVEL_OUTOF10: 8
PAINLEVEL_OUTOF10: 4
PAINLEVEL_OUTOF10: 0

## 2018-05-14 NOTE — PROGRESS NOTES
"[facility] Hospitalist Progress Note    Date of Service: 5/14/2018    Chief Complaint  72 y.o. male admitted 4/30/2018 with chest pain / NSTEMI s/p PCI with hospital course c/b hospital induced delirium in the setting of parkinson's disease. Persistent concerns for ischemia post PCI. Patient transitioned to comfort measures 05/06/18 in line with patient wishes at this time    Interval Problem Update  5/8: Awaiting discharge home with hospice care (not available in [place]). SW working on it  Wants his medications burden decrease, MV stopped  Discussed with nursing staff  5/9: No good DC option for home in [place], pending group home?  5/10: DC ACE-I/spirono due to hypotension.  Pending confirmation of community support prior to DC home w [place]  5/11: Roxanol added for CP, pt frustrated and yelling.  Psych eval placed for capacity.  5/12: Pending psych eval for capacity.  5/13: Waiting for psych/capacity.  Difficult dispo  5/14: Psych determined patient has capacity to \"refuse\" DC plan.  Does not qualify for home o2    Consultants/Specialty  Cardiology  Palliative care    Disposition  On comfort measures   He wants to DC home, no home care.  Psych states he has capacity        Review of Systems   Constitutional: Negative for chills, diaphoresis, fever, malaise/fatigue and weight loss.   HENT: Negative for hearing loss and tinnitus.    Eyes: Negative for blurred vision and double vision.   Respiratory: Positive for shortness of breath. Negative for cough, hemoptysis, sputum production and wheezing.    Cardiovascular: Positive for chest pain (Worse after gi cocktail) and palpitations. Negative for orthopnea, claudication, leg swelling and PND.   Gastrointestinal: Negative for abdominal pain, blood in stool, constipation, diarrhea, heartburn, melena, nausea and vomiting.   Genitourinary: Negative for dysuria, flank pain, frequency, hematuria and urgency.   Musculoskeletal: Negative for back pain, falls, joint pain, " myalgias and neck pain.   Skin: Negative for itching and rash.   Neurological: Negative for dizziness, tingling, tremors, sensory change, speech change, focal weakness, seizures, loss of consciousness, weakness and headaches.   Psychiatric/Behavioral: Negative for depression. The patient is nervous/anxious.         Refusing home care      Physical Exam  Laboratory/Imaging   Hemodynamics  Temp (24hrs), Av.3 °C (97.4 °F), Min:36.2 °C (97.1 °F), Max:36.5 °C (97.7 °F)   Temperature: 36.2 °C (97.1 °F)  Pulse  Av.7  Min: 59  Max: 123   Blood Pressure : (!) 92/42      Respiratory      Respiration: 16, Pulse Oximetry: 95 %     Work Of Breathing / Effort: Mild  RUL Breath Sounds: Clear, RML Breath Sounds: Diminished, RLL Breath Sounds: Diminished, NOA Breath Sounds: Clear, LLL Breath Sounds: Diminished    Fluids    Intake/Output Summary (Last 24 hours) at 18 1645  Last data filed at 18 1200   Gross per 24 hour   Intake              360 ml   Output              350 ml   Net               10 ml       Nutrition  Orders Placed This Encounter   Procedures   • Diet Order     Standing Status:   Standing     Number of Occurrences:   1     Order Specific Question:   Diet:     Answer:   Cardiac [6]     Physical Exam   Constitutional: He is oriented to person, place, and time. He appears well-developed.  Non-toxic appearance. No distress.   HENT:   Head: Normocephalic and atraumatic.   Mouth/Throat: Oropharynx is clear and moist. No oropharyngeal exudate.   Eyes: Conjunctivae are normal. Pupils are equal, round, and reactive to light. Right eye exhibits no discharge. Left eye exhibits no discharge. No scleral icterus.   Neck: Normal range of motion. No JVD present. No edema and no erythema present.   Cardiovascular: Normal rate and regular rhythm.  Exam reveals no gallop and no friction rub.    Murmur heard.  Pulmonary/Chest: Effort normal. No stridor. No respiratory distress. He has no wheezes. He has rales. He  exhibits no tenderness.   Abdominal: Soft. Bowel sounds are normal. He exhibits no distension. There is no tenderness. There is no rebound.   Musculoskeletal: Normal range of motion. He exhibits edema (Minimal). He exhibits no tenderness or deformity.   Neurological: He is alert and oriented to person, place, and time. He has normal reflexes. No cranial nerve deficit.   Skin: Skin is warm and dry. No rash noted. He is not diaphoretic. No erythema.   Psychiatric: His affect is blunt and labile. He is not agitated, not aggressive and not slowed. Cognition and memory are impaired. He expresses impulsivity and inappropriate judgment.   Nursing note and vitals reviewed.                               Assessment/Plan     * Comfort measures only status- (present on admission)   Assessment & Plan    Discussed with patient 05/06/18  Patient transitioned to comfort measures        Chest pain- (present on admission)   Assessment & Plan    Worsening CP 05/06/18, with profound anxiety / panic attacks in the setting of NSTEMI    Etiology: Recurrent Occlusive CAD, ? Subendocardial ischemia with Severe AS, Dressler's syndrome / others    CTA PE negative.    Discussed with Dr Patel and they feel pt not good candidate for further interventions including repeat angiography / TAVR  Cardiology team at this time recommend hospice care / comfort measures  Discussed with patient 05/06/18 and he wishes to proceed with comfort/hospice  Patient wants to proceed with comfort measures / hospice care at this time  Patient wants to be able to go back to his home    Continue Scheduled toradol, pepcid  Morphine PRN   Xanax as needed for anxiety  Nasal congestion worsening anxiety / symptoms. Scheduled afrin / ocean spray  Discuss with SW regarding hospice placement at home which may be difficult given no hospice services in patient's local town        Pleural effusion- (present on admission)   Assessment & Plan    B/L   CHF related  Patient is  relatively asymptomatic  Minimal O2 requirements now  High risk thoracentesis with DAPT / Aortic stenosis  Poor tolerance of aggressive Diuresis  Patient wants to proceed with comfort measures at this time  Continue PO Lasix / HF regimen  Continue clinical monitoring and optimize clinically as able        Acute hypoxemic respiratory failure (HCC)- (present on admission)   Assessment & Plan    Improved  This is 2/2 acute cardiogenic pulmonary edema POA and b/l pleural effusions in the setting of ACS / CHF  Continue PO Lasix, 20mg daily  DVT duplex obtaiend and negative. CTA PE negative.   Comfort care        Acute systolic (congestive) heart failure (HCC)- (present on admission)   Assessment & Plan    Continue Lasix, lower dose  DC spironolactone (comfort care)  Continue metop  DC ACE-I (due to hypotension)  Appreciated cardiology recommendations  Patient wants to proceed with comfort measures and hospice care as able at his home        Severe aortic stenosis- (present on admission)   Assessment & Plan    Poor candidate for TAVR per cardiology team  Recommend hospice care  Discussed with patient 05/06/18  Patient wants to proceed with hospice / comfort measures at this time        Encephalopathy- (present on admission)   Assessment & Plan    Resolved  2/2 Hospital / ICU delirium in the setting of underlying parkinson disease  Continue Seroquel 100 mg HS while in the hospital  Continue comfort measures  Aspiration / Fall precautions  Close clinical monitoring         NSTEMI (non-ST elevated myocardial infarction) (HCC)- (present on admission)   Assessment & Plan    Noted outside facility, transferred here for follow-up care, now status post cardiac cath with stenting  Continue DAPT, BB, High intensity statin therapy.  Hold ACE-I due to hypotension  Has ongoing intermittent chest pain that is likely angina, not treating due to patient decision for comfort care (5/6/18)  Poor candidate for further interventions per   Jorge        Urinary retention   Assessment & Plan    750 and also postvoid in bladder (5/11). He required straight cath ×1  -Order for when necessary straight cath if PVR is greater than 350        Anemia- (present on admission)   Assessment & Plan    Likely AOCD  DC supplements, comfort care        Leukocytosis- (present on admission)   Assessment & Plan    Resolved  Underlying pneumonia cannot be ruled out  UA is negative  Encephalopathy improved  Given advanced age, belief empiric abx therapy in the setting of elevated ESR, CRP, Procalcitonin and encephalopathy is reasonable   De escalated to PO Augmentin completed 5/8        Cognitive decline- (present on admission)   Assessment & Plan    In the setting of parkinson disease  Back to baseline but still appears to have poor decision-making ability and unsafe discharge plan at this time  Evaluate for capacity, psych eval placed        Parkinson's disease (HCC)- (present on admission)   Assessment & Plan    Continue Sinemet and selegiline, home meds  Ambulating well within hospital wards  SOB / CP / Anxiety / Panic attacks / Cardiac comorbidities limiting ambulation        Seizure disorder (HCC)- (present on admission)   Assessment & Plan    No new seizures noted, continue Keppra and gabapentin at home doses  Encephalopathy, EEG negative  Encephalopathy has resolved now            Quality-Core Measures   Reviewed items::  Labs reviewed, Medications reviewed and Radiology images reviewed  Thomas catheter::  No Thomas  DVT prophylaxis pharmacological::  Contraindicated - High bleeding risk  DVT prophylaxis - mechanical:  SCDs  Assessed for rehabilitation services:  Patient was assess for and/or received rehabilitation services during this hospitalization

## 2018-05-14 NOTE — PROGRESS NOTES
UPDATE:  Attempted to place O2 order, based on ambulatory and at rest oxygen saturations on room air, the patient does not qualify for home O2. I have not placed in order with DME, again because he does not qualify.

## 2018-05-14 NOTE — DISCHARGE PLANNING
"Anticipated DC Disposition: Home    Action: Per Psych, pt is capacitated for medical decision making. LSW met with pt at bedside, he insists on going home as soon as possible. LSW explained  and [facility] services and pt is agreeable to both. Choice form completed for [facility]. Pt also requires oxygen. Choice form completed for [facility] and LSW requested oxygen order from hospitalist RN.   LSW called [facility]. They do not service [facility] for 24/7 care but would likely be able to offer services to pt. Awaiting return call. Pt would like to discharge regardless of whether or not in-home caregivers are arranged as he reports he can \"get by\" with the support he has in place.    LSW also spoke to pt about safety at home and asked him to consider going to [facility] if he is struggling at home.     Pt states that his son Bong will pick him up either tonight or tomorrow.     Barriers to Discharge: Unknown.    Plan: Pt to DC home, pending oxygen delivery.     "

## 2018-05-14 NOTE — PROGRESS NOTES
Patient complaining of chest pain, 8/10, right chest area nonradiating. Started after he finished eating dinner. Given morphine PO as he has no IV access, he is refusing GI cocktail at this time. Patient is anxious, restless, uncomfortable, xanax given with pending effect. Satting 96% on 3L 02. In bed with alarms on

## 2018-05-14 NOTE — DISCHARGE PLANNING
Received choice form from [facility] Lisa for . Referral sent to [facility] at [phone].

## 2018-05-14 NOTE — DISCHARGE PLANNING
Anticipated Discharge Disposition: Home with PCA, HH and DME    Action: KEVIN called Jessica to discuss in home caregiver services. Jessica stated they need to call their current employees and see if anyone is agreeable to service the [facility]. Jessica will call SW back with information.     SW waiting for DME order to send referral.   KEVIN met with pt to discuss home O2; pt is agreeable to home O2 and made choice for [facility] and [employer] (whichever one is faster). KEVIN spoke with pt about home caregiver services and was not agreeable to them or paying for them.    Barriers to Discharge: HH acceptance, caregiving services, DME and pt's willingness to pay for caregiver services.     Plan: KEVIN to obtain choice for DME and follow up with Jessica.  KEVIN asked hospitalist PROMISE Cheney to round with pt. KEVIN spoke with Dr. Brewer who stated pt doesn't meet criteria for home O2 and Dr. Brewer is in agreement that if pt wants to discharge without caregiver support in place that will be his choice.

## 2018-05-15 PROBLEM — I50.9 CHF (CONGESTIVE HEART FAILURE) (HCC): Status: ACTIVE | Noted: 2018-05-15

## 2018-05-15 PROCEDURE — 700102 HCHG RX REV CODE 250 W/ 637 OVERRIDE(OP): Performed by: INTERNAL MEDICINE

## 2018-05-15 PROCEDURE — 51798 US URINE CAPACITY MEASURE: CPT

## 2018-05-15 PROCEDURE — A9270 NON-COVERED ITEM OR SERVICE: HCPCS | Performed by: INTERNAL MEDICINE

## 2018-05-15 PROCEDURE — 700111 HCHG RX REV CODE 636 W/ 250 OVERRIDE (IP): Performed by: INTERNAL MEDICINE

## 2018-05-15 PROCEDURE — 99232 SBSQ HOSP IP/OBS MODERATE 35: CPT | Performed by: INTERNAL MEDICINE

## 2018-05-15 PROCEDURE — A9270 NON-COVERED ITEM OR SERVICE: HCPCS | Performed by: NURSE PRACTITIONER

## 2018-05-15 PROCEDURE — 700102 HCHG RX REV CODE 250 W/ 637 OVERRIDE(OP): Performed by: NURSE PRACTITIONER

## 2018-05-15 PROCEDURE — 770001 HCHG ROOM/CARE - MED/SURG/GYN PRIV*

## 2018-05-15 PROCEDURE — 700112 HCHG RX REV CODE 229: Performed by: INTERNAL MEDICINE

## 2018-05-15 RX ADMIN — TICAGRELOR 90 MG: 90 TABLET ORAL at 17:50

## 2018-05-15 RX ADMIN — SELEGILINE HYDROCHLORIDE 5 MG: 5 TABLET ORAL at 18:00

## 2018-05-15 RX ADMIN — OXYMETAZOLINE HYDROCHLORIDE 2 SPRAY: 5 SPRAY NASAL at 05:19

## 2018-05-15 RX ADMIN — ALPRAZOLAM 1 MG: 0.5 TABLET ORAL at 01:06

## 2018-05-15 RX ADMIN — GABAPENTIN 300 MG: 300 CAPSULE ORAL at 12:30

## 2018-05-15 RX ADMIN — SALINE NASAL SPRAY 2 SPRAY: 1.5 SOLUTION NASAL at 17:52

## 2018-05-15 RX ADMIN — TICAGRELOR 90 MG: 90 TABLET ORAL at 05:26

## 2018-05-15 RX ADMIN — GABAPENTIN 300 MG: 300 CAPSULE ORAL at 05:20

## 2018-05-15 RX ADMIN — DOCUSATE CALCIUM 240 MG: 240 CAPSULE, LIQUID FILLED ORAL at 17:51

## 2018-05-15 RX ADMIN — OXYMETAZOLINE HYDROCHLORIDE 2 SPRAY: 5 SPRAY NASAL at 17:52

## 2018-05-15 RX ADMIN — LEVETIRACETAM 1500 MG: 500 TABLET, FILM COATED ORAL at 05:20

## 2018-05-15 RX ADMIN — LEVETIRACETAM 1500 MG: 500 TABLET, FILM COATED ORAL at 17:50

## 2018-05-15 RX ADMIN — FUROSEMIDE 20 MG: 20 TABLET ORAL at 05:20

## 2018-05-15 RX ADMIN — SALINE NASAL SPRAY 2 SPRAY: 1.5 SOLUTION NASAL at 05:19

## 2018-05-15 RX ADMIN — ASPIRIN 81 MG: 81 TABLET, COATED ORAL at 05:20

## 2018-05-15 RX ADMIN — CARBIDOPA AND LEVODOPA 1.5 TABLET: 25; 100 TABLET ORAL at 12:30

## 2018-05-15 RX ADMIN — FAMOTIDINE 20 MG: 20 TABLET ORAL at 17:52

## 2018-05-15 RX ADMIN — SELEGILINE HYDROCHLORIDE 5 MG: 5 TABLET ORAL at 05:19

## 2018-05-15 RX ADMIN — METOPROLOL SUCCINATE 12.5 MG: 25 TABLET, EXTENDED RELEASE ORAL at 17:51

## 2018-05-15 RX ADMIN — CARBIDOPA AND LEVODOPA 1.5 TABLET: 25; 100 TABLET ORAL at 17:51

## 2018-05-15 RX ADMIN — CARBIDOPA AND LEVODOPA 1.5 TABLET: 25; 100 TABLET ORAL at 20:50

## 2018-05-15 RX ADMIN — QUETIAPINE FUMARATE 100 MG: 25 TABLET ORAL at 17:50

## 2018-05-15 RX ADMIN — FAMOTIDINE 20 MG: 20 TABLET ORAL at 05:20

## 2018-05-15 RX ADMIN — POTASSIUM CHLORIDE 20 MEQ: 1500 TABLET, EXTENDED RELEASE ORAL at 05:20

## 2018-05-15 RX ADMIN — GABAPENTIN 300 MG: 300 CAPSULE ORAL at 17:51

## 2018-05-15 RX ADMIN — CARBIDOPA AND LEVODOPA 1.5 TABLET: 25; 100 TABLET ORAL at 05:20

## 2018-05-15 RX ADMIN — ATORVASTATIN CALCIUM 40 MG: 40 TABLET, FILM COATED ORAL at 18:00

## 2018-05-15 ASSESSMENT — ENCOUNTER SYMPTOMS
HEARTBURN: 0
COUGH: 0
NECK PAIN: 0
PND: 0
SPUTUM PRODUCTION: 0
SHORTNESS OF BREATH: 1
HEADACHES: 0
SPEECH CHANGE: 0
FLANK PAIN: 0
PALPITATIONS: 1
CONSTIPATION: 0
DIZZINESS: 0
FALLS: 0
DOUBLE VISION: 0
CLAUDICATION: 0
DEPRESSION: 0
NERVOUS/ANXIOUS: 1
MYALGIAS: 0
BLOOD IN STOOL: 0
ABDOMINAL PAIN: 0
BLURRED VISION: 0
TINGLING: 0
ORTHOPNEA: 0
SEIZURES: 0
CHILLS: 0
HEMOPTYSIS: 0
FOCAL WEAKNESS: 0
SENSORY CHANGE: 0
NAUSEA: 0
WEAKNESS: 0
LOSS OF CONSCIOUSNESS: 0
TREMORS: 0
WHEEZING: 0

## 2018-05-15 ASSESSMENT — PAIN SCALES - GENERAL
PAINLEVEL_OUTOF10: 0
PAINLEVEL_OUTOF10: 7
PAINLEVEL_OUTOF10: 4
PAINLEVEL_OUTOF10: 0
PAINLEVEL_OUTOF10: 4
PAINLEVEL_OUTOF10: 0

## 2018-05-15 NOTE — FACE TO FACE
Face to Face Note  -  Durable Medical Equipment    Fabienne Olvera M.D. - NPI: [id number]  I certify that this patient is under my care and that they had a durable medical equipment(DME)face to face encounter by myself that meets the physician DME face-to-face encounter requirements with this patient on:    Date of encounter:   Patient:                    MRN:                       YOB: 2018  Prince Pham  [id number]  1945     The encounter with the patient was in whole, or in part, for the following medical condition, which is the primary reason for durable medical equipment:  CHF    I certify that, based on my findings, the following durable medical equipment is medically necessary:  Oxygen.    HOME O2 Saturation Measurements:(Values must be present for Home Oxygen orders)  Room air sat at rest: 95  Room air sat with amb: 92   ,     ,    Is the patient mobile?: Yes    My Clinical findings support the need for the above equipment due to:  Hypoxia    Supporting Symptoms: SOB

## 2018-05-15 NOTE — PROGRESS NOTES
"[facility] Hospitalist Progress Note    Date of Service: 5/15/2018    Chief Complaint  72 y.o. male admitted 4/30/2018 with chest pain / NSTEMI s/p PCI with hospital course c/b hospital induced delirium in the setting of parkinson's disease. Persistent concerns for ischemia post PCI. Patient transitioned to comfort measures 05/06/18 in line with patient wishes at this time    Interval Problem Update  5/8: Awaiting discharge home with hospice care (not available in [place]). SW working on it  Wants his medications burden decrease, MV stopped  Discussed with nursing staff  5/9: No good DC option for home in [place], pending group home?  5/10: DC ACE-I/spirono due to hypotension.  Pending confirmation of community support prior to DC home w [place]  5/11: Roxanol added for CP, pt frustrated and yelling.  Psych eval placed for capacity.  5/12: Pending psych eval for capacity.  5/13: Waiting for psych/capacity.  Difficult dispo  5/14: Psych determined patient has capacity to \"refuse\" DC plan.  Does not qualify for home o2  5/15 patient wants to go home and qualified for home O2. Pending DME to set up. Denied significant chest pain or shortness of breath at this moment.    Consultants/Specialty  Cardiology  Palliative care    Disposition  On comfort measures   He wants to DC home, no home care.  Psych states he has capacity        Review of Systems   Constitutional: Negative for chills and malaise/fatigue.   HENT: Negative for hearing loss and tinnitus.    Eyes: Negative for blurred vision and double vision.   Respiratory: Positive for shortness of breath. Negative for cough, hemoptysis, sputum production and wheezing.    Cardiovascular: Positive for chest pain (Worse after gi cocktail) and palpitations. Negative for orthopnea, claudication, leg swelling and PND.   Gastrointestinal: Negative for abdominal pain, blood in stool, constipation, heartburn, melena and nausea.   Genitourinary: Negative for dysuria, flank pain, " frequency, hematuria and urgency.   Musculoskeletal: Negative for falls, myalgias and neck pain.   Skin: Negative for itching and rash.   Neurological: Negative for dizziness, tingling, tremors, sensory change, speech change, focal weakness, seizures, loss of consciousness, weakness and headaches.   Psychiatric/Behavioral: Negative for depression. The patient is nervous/anxious.         Refusing home care      Physical Exam  Laboratory/Imaging   Hemodynamics  Temp (24hrs), Av.3 °C (97.4 °F), Min:36.1 °C (97 °F), Max:36.6 °C (97.8 °F)   Temperature: 36.6 °C (97.8 °F)  Pulse  Av.7  Min: 59  Max: 123   Blood Pressure : 113/64      Respiratory      Respiration: 20, Pulse Oximetry: 94 %     Work Of Breathing / Effort: Mild  RUL Breath Sounds: Clear, RML Breath Sounds: Diminished, RLL Breath Sounds: Diminished, NOA Breath Sounds: Clear, LLL Breath Sounds: Diminished    Fluids    Intake/Output Summary (Last 24 hours) at 05/15/18 1612  Last data filed at 05/15/18 1400   Gross per 24 hour   Intake              240 ml   Output              550 ml   Net             -310 ml       Nutrition  Orders Placed This Encounter   Procedures   • Diet Order     Standing Status:   Standing     Number of Occurrences:   1     Order Specific Question:   Diet:     Answer:   Cardiac [6]     Physical Exam   Constitutional: He is oriented to person, place, and time. He appears well-developed and well-nourished.  Non-toxic appearance.   HENT:   Head: Normocephalic and atraumatic.   Mouth/Throat: Oropharynx is clear and moist. No oropharyngeal exudate.   Eyes: Conjunctivae are normal. Pupils are equal, round, and reactive to light. Right eye exhibits no discharge. Left eye exhibits no discharge. No scleral icterus.   Neck: Normal range of motion. No JVD present. No edema and no erythema present.   Cardiovascular: Normal rate and regular rhythm.  Exam reveals no gallop.    Murmur heard.  Pulmonary/Chest: Effort normal. No stridor. He has no  wheezes. He has rales. He exhibits no tenderness.   Abdominal: Soft. Bowel sounds are normal. He exhibits no distension and no mass. There is no tenderness. There is no rebound.   Musculoskeletal: Normal range of motion. He exhibits edema (Minimal). He exhibits no tenderness or deformity.   Neurological: He is alert and oriented to person, place, and time. He has normal reflexes. No cranial nerve deficit.   Skin: Skin is warm and dry. No rash noted. He is not diaphoretic. No erythema.   Psychiatric: His affect is blunt and labile. He is not agitated, not aggressive and not slowed. Cognition and memory are impaired. He expresses impulsivity and inappropriate judgment.   Nursing note and vitals reviewed.                               Assessment/Plan     * Comfort measures only status- (present on admission)   Assessment & Plan    Discussed with patient 05/06/18  Patient transitioned to comfort measures        Chest pain- (present on admission)   Assessment & Plan    Worsening CP 05/06/18, with profound anxiety / panic attacks in the setting of NSTEMI    Etiology: Recurrent Occlusive CAD, ? Subendocardial ischemia with Severe AS, Dressler's syndrome / others    CTA PE negative.    Discussed with Dr Patel and they feel pt not good candidate for further interventions including repeat angiography / TAVR  Cardiology team at this time recommend hospice care / comfort measures  Discussed with patient 05/06/18 and he wishes to proceed with comfort/hospice  Patient wants to proceed with comfort measures / hospice care at this time  Patient wants to be able to go back to his home    Continue Scheduled toradol, pepcid  Morphine PRN   Xanax as needed for anxiety  Nasal congestion worsening anxiety / symptoms. Scheduled afrin / ocean spray  Discuss with SW regarding hospice placement at home which may be difficult given no hospice services in patient's local town        Pleural effusion- (present on admission)   Assessment & Plan     B/L   CHF related  Patient is relatively asymptomatic  Minimal O2 requirements now  High risk thoracentesis with DAPT / Aortic stenosis  Poor tolerance of aggressive Diuresis  Patient wants to proceed with comfort measures at this time  Continue PO Lasix / HF regimen  Continue clinical monitoring and optimize clinically as able        Acute hypoxemic respiratory failure (HCC)- (present on admission)   Assessment & Plan    Improved  This is 2/2 acute cardiogenic pulmonary edema POA and b/l pleural effusions in the setting of ACS / CHF  Continue PO Lasix, 20mg daily  DVT duplex obtaiend and negative. CTA PE negative.   Comfort care        Acute systolic (congestive) heart failure (HCC)- (present on admission)   Assessment & Plan    Continue Lasix, lower dose  DC spironolactone (comfort care)  Continue metop  DC ACE-I (due to hypotension)  Appreciated cardiology recommendations  Patient wants to proceed with comfort measures and hospice care as able at his home        Severe aortic stenosis- (present on admission)   Assessment & Plan    Poor candidate for TAVR per cardiology team  Recommend hospice care  Discussed with patient 05/06/18  Patient wants to proceed with hospice / comfort measures at this time        Encephalopathy- (present on admission)   Assessment & Plan    Resolved  2/2 Hospital / ICU delirium in the setting of underlying parkinson disease  Continue Seroquel 100 mg HS while in the hospital  Continue comfort measures  Aspiration / Fall precautions  Close clinical monitoring         NSTEMI (non-ST elevated myocardial infarction) ([facility])- (present on admission)   Assessment & Plan    Noted outside facility, transferred here for follow-up care, now status post cardiac cath with stenting  Continue DAPT, BB, High intensity statin therapy.  Hold ACE-I due to hypotension  Has ongoing intermittent chest pain that is likely angina, not treating due to patient decision for comfort care (5/6/18)  Poor candidate  for further interventions per Dr Patel        Urinary retention   Assessment & Plan    750 and also postvoid in bladder (5/11). He required straight cath ×1  -Order for when necessary straight cath if PVR is greater than 350        Anemia- (present on admission)   Assessment & Plan    Likely AOCD  DC supplements, comfort care        Leukocytosis- (present on admission)   Assessment & Plan    Resolved  Underlying pneumonia cannot be ruled out  UA is negative  Encephalopathy improved  Given advanced age, belief empiric abx therapy in the setting of elevated ESR, CRP, Procalcitonin and encephalopathy is reasonable   De escalated to PO Augmentin completed 5/8        Cognitive decline- (present on admission)   Assessment & Plan    In the setting of parkinson disease  Back to baseline but still appears to have poor decision-making ability and unsafe discharge plan at this time  Evaluate for capacity, psych eval placed        Parkinson's disease (HCC)- (present on admission)   Assessment & Plan    Continue Sinemet and selegiline, home meds  Ambulating well within hospital wards  SOB / CP / Anxiety / Panic attacks / Cardiac comorbidities limiting ambulation        Seizure disorder (HCC)- (present on admission)   Assessment & Plan    No new seizures noted, continue Keppra and gabapentin at home doses  Encephalopathy, EEG negative  Encephalopathy has resolved now            Quality-Core Measures   Reviewed items::  Labs reviewed, Medications reviewed and Radiology images reviewed  Thomas catheter::  No Thomas  DVT prophylaxis pharmacological::  Contraindicated - High bleeding risk  DVT prophylaxis - mechanical:  SCDs  Assessed for rehabilitation services:  Patient was assess for and/or received rehabilitation services during this hospitalization     For complexity-based billing, please refer to the history, exam, and decison making above. In addition, I spent >35 minutes caring for the patient today. More than 50% of the time  was spent counseling and coordinating care.    I have discussed with RN and CM and SW and other consultants about patient's plan.

## 2018-05-15 NOTE — FACE TO FACE
Face to Face Note  -  Durable Medical Equipment    Fabienne Olvera M.D. - NPI: [id number]  I certify that this patient is under my care and that they had a durable medical equipment(DME)face to face encounter by myself that meets the physician DME face-to-face encounter requirements with this patient on:    Date of encounter:   Patient:                    MRN:                       YOB: 2018  Prince Pham  [id number]  1945     The encounter with the patient was in whole, or in part, for the following medical condition, which is the primary reason for durable medical equipment:  CHF    I certify that, based on my findings, the following durable medical equipment is medically necessary:  Oxygen.    HOME O2 Saturation Measurements:(Values must be present for Home Oxygen orders)  Room air sat at rest: 74  Room air sat with amb: 74  With liters of O2: 3, O2 sat at rest with O2: 94  With Liters of O2: 3, O2 sat with amb with O2 : 91  Is the patient mobile?: No    My Clinical findings support the need for the above equipment due to:  Hypoxia    Supporting Symptoms: SOB

## 2018-05-15 NOTE — DISCHARGE PLANNING
"Received declined notification from [facility]. \"This is a hospice patient for [place] -only 1 RN in [facility]- cannot support hospice need there. I apologize- we cannot support his needs at this time.\" [facility]. Notified ANGELA Gonzalez.  "

## 2018-05-16 VITALS
HEART RATE: 79 BPM | TEMPERATURE: 97.6 F | DIASTOLIC BLOOD PRESSURE: 64 MMHG | HEIGHT: 70 IN | RESPIRATION RATE: 16 BRPM | SYSTOLIC BLOOD PRESSURE: 118 MMHG | WEIGHT: 200.62 LBS | OXYGEN SATURATION: 97 % | BODY MASS INDEX: 28.72 KG/M2

## 2018-05-16 PROCEDURE — A9270 NON-COVERED ITEM OR SERVICE: HCPCS | Performed by: INTERNAL MEDICINE

## 2018-05-16 PROCEDURE — 700112 HCHG RX REV CODE 229: Performed by: INTERNAL MEDICINE

## 2018-05-16 PROCEDURE — 700102 HCHG RX REV CODE 250 W/ 637 OVERRIDE(OP): Performed by: INTERNAL MEDICINE

## 2018-05-16 PROCEDURE — 99239 HOSP IP/OBS DSCHRG MGMT >30: CPT | Performed by: INTERNAL MEDICINE

## 2018-05-16 PROCEDURE — 700102 HCHG RX REV CODE 250 W/ 637 OVERRIDE(OP): Performed by: NURSE PRACTITIONER

## 2018-05-16 PROCEDURE — 700102 HCHG RX REV CODE 250 W/ 637 OVERRIDE(OP): Mod: JG | Performed by: INTERNAL MEDICINE

## 2018-05-16 PROCEDURE — A9270 NON-COVERED ITEM OR SERVICE: HCPCS | Performed by: NURSE PRACTITIONER

## 2018-05-16 RX ADMIN — LEVETIRACETAM 1500 MG: 500 TABLET, FILM COATED ORAL at 16:45

## 2018-05-16 RX ADMIN — SALINE NASAL SPRAY 2 SPRAY: 1.5 SOLUTION NASAL at 05:50

## 2018-05-16 RX ADMIN — DOCUSATE CALCIUM 240 MG: 240 CAPSULE, LIQUID FILLED ORAL at 05:48

## 2018-05-16 RX ADMIN — SALINE NASAL SPRAY 2 SPRAY: 1.5 SOLUTION NASAL at 16:46

## 2018-05-16 RX ADMIN — CARBIDOPA AND LEVODOPA 1.5 TABLET: 25; 100 TABLET ORAL at 05:46

## 2018-05-16 RX ADMIN — CARBIDOPA AND LEVODOPA 1.5 TABLET: 25; 100 TABLET ORAL at 12:38

## 2018-05-16 RX ADMIN — SELEGILINE HYDROCHLORIDE 5 MG: 5 TABLET ORAL at 05:49

## 2018-05-16 RX ADMIN — ATORVASTATIN CALCIUM 40 MG: 40 TABLET, FILM COATED ORAL at 16:44

## 2018-05-16 RX ADMIN — METOPROLOL SUCCINATE 12.5 MG: 25 TABLET, EXTENDED RELEASE ORAL at 16:45

## 2018-05-16 RX ADMIN — GABAPENTIN 300 MG: 300 CAPSULE ORAL at 05:47

## 2018-05-16 RX ADMIN — FUROSEMIDE 20 MG: 20 TABLET ORAL at 05:47

## 2018-05-16 RX ADMIN — FAMOTIDINE 20 MG: 20 TABLET ORAL at 16:44

## 2018-05-16 RX ADMIN — GABAPENTIN 300 MG: 300 CAPSULE ORAL at 16:44

## 2018-05-16 RX ADMIN — ASPIRIN 81 MG: 81 TABLET, COATED ORAL at 05:48

## 2018-05-16 RX ADMIN — TICAGRELOR 90 MG: 90 TABLET ORAL at 05:47

## 2018-05-16 RX ADMIN — FAMOTIDINE 20 MG: 20 TABLET ORAL at 05:48

## 2018-05-16 RX ADMIN — GABAPENTIN 300 MG: 300 CAPSULE ORAL at 12:38

## 2018-05-16 RX ADMIN — CARBIDOPA AND LEVODOPA 1.5 TABLET: 25; 100 TABLET ORAL at 16:44

## 2018-05-16 RX ADMIN — OXYMETAZOLINE HYDROCHLORIDE 2 SPRAY: 5 SPRAY NASAL at 05:50

## 2018-05-16 RX ADMIN — SALINE NASAL SPRAY 2 SPRAY: 1.5 SOLUTION NASAL at 12:38

## 2018-05-16 RX ADMIN — OXYMETAZOLINE HYDROCHLORIDE 2 SPRAY: 5 SPRAY NASAL at 16:45

## 2018-05-16 RX ADMIN — TICAGRELOR 90 MG: 90 TABLET ORAL at 16:45

## 2018-05-16 RX ADMIN — SELEGILINE HYDROCHLORIDE 5 MG: 5 TABLET ORAL at 16:44

## 2018-05-16 RX ADMIN — POTASSIUM CHLORIDE 20 MEQ: 1500 TABLET, EXTENDED RELEASE ORAL at 05:49

## 2018-05-16 RX ADMIN — LEVETIRACETAM 1500 MG: 500 TABLET, FILM COATED ORAL at 05:48

## 2018-05-16 ASSESSMENT — PAIN SCALES - GENERAL
PAINLEVEL_OUTOF10: 0

## 2018-05-16 NOTE — DISCHARGE PLANNING
Received call back from Norma with Karlie referral is with the audit dept. Norma will call back. Notified ANGELA Gonzalez.

## 2018-05-16 NOTE — DISCHARGE PLANNING
Received call from Norma Ziegler who spoke to pt son, who Is on the way to  equipment and then pt.   Notified LSW Lisa and pt.   Received voicemail from [facility] transport they cannot transport pt today.

## 2018-05-16 NOTE — DISCHARGE INSTRUCTIONS
Discharge Instructions    Discharged to home by taxi with escort. Discharged via wheelchair, hospital escort: Yes.  Special equipment needed: Oxygen    Be sure to schedule a follow-up appointment with your primary care doctor or any specialists as instructed.     Discharge Plan:   Diet Plan: Discussed  Activity Level: Discussed  Confirmed Follow up Appointment: Patient to Call and Schedule Appointment  Confirmed Symptoms Management: Discussed  Medication Reconciliation Updated: Yes  Influenza Vaccine Indication: Patient Refuses    I understand that a diet low in cholesterol, fat, and sodium is recommended for good health. Unless I have been given specific instructions below for another diet, I accept this instruction as my diet prescription.   Other diet: Cardiac    Special Instructions:   HF Patient Discharge Instructions  · Monitor your weight daily, and maintain a weight chart, to track your weight changes.   · Activity as tolerated, unless your Doctor has ordered otherwise. Other activity order: As tolerated, pt is comfort care.  · Follow a low fat, low cholesterol, low salt diet unless instructed otherwise by your Doctor. Read the labels on the back of food products and track your intake of fat, cholesterol and salt.   · Fluid Restriction No. If a Fluid Restriction has been ordered by your Doctor, measure fluids with a measuring cup to ensure that you are not exceeding the restriction.   · No smoking.  · Oxygen Yes. If your Doctor has ordered that you wear Oxygen at home, it is important to wear it as ordered.  · Did you receive an explanation from staff on the importance of taking each of your medications and why it is necessary to keep taking them unless your doctor says to stop? Yes  · Were all of your questions answered about how to manage your heart failure and what to do if you have increased signs and symptoms after you go home? Yes  · Do you feel like your heart failure care team involved you in the  care treatment plan and allowed you to make decisions regarding your care while in the hospital and addressed any discharge needs you might have? Yes    See the educational handout provided at discharge for more information on monitoring your daily weight, activity and diet. This also explains more about Heart Failure, symptoms of a flare-up and some of the tests that you have undergone.     Warning Signs of a Flare-Up include:  · Swelling in the ankles or lower legs.  · Shortness of breath, while at rest, or while doing normal activities.   · Shortness of breath at night when in bed, or coughing in bed.   · Requiring more pillows to sleep at night, or needing to sit up at night to sleep.  · Feeling weak, dizzy or fatigued.     When to call your Doctor:  · Call [facility] seven days a week from 8:00 a.m. to 8:00 p.m. for medical questions [phone].  · Call your Primary Care Physician or Cardiologist if:   1. You experience any pain radiating to your jaw or neck.  2. You have any difficulty breathing.  3. You experience weight gain of 3 lbs in a day or 5 lbs in a week.   4. You feel any palpitations or irregular heartbeats.  5. You become dizzy or lose consciousness.   If you have had an angiogram or had a pacemaker or AICD placed, and experience:  1. Bleeding, drainage or swelling at the surgical / puncture site.  2. Fever greater than 100.0 F  3. Shock from internal defibrillator.  4. Cool and / or numb extremities.      · Is patient discharged on Warfarin / Coumadin?   No     Depression / Suicide Risk    As you are discharged from this [facility], it is important to learn how to keep safe from harming yourself.    Recognize the warning signs:  · Abrupt changes in personality, positive or negative- including increase in energy   · Giving away possessions  · Change in eating patterns- significant weight changes-  positive or negative  · Change in sleeping patterns- unable to sleep or  sleeping all the time   · Unwillingness or inability to communicate  · Depression  · Unusual sadness, discouragement and loneliness  · Talk of wanting to die  · Neglect of personal appearance   · Rebelliousness- reckless behavior  · Withdrawal from people/activities they love  · Confusion- inability to concentrate     If you or a loved one observes any of these behaviors or has concerns about self-harm, here's what you can do:  · Talk about it- your feelings and reasons for harming yourself  · Remove any means that you might use to hurt yourself (examples: pills, rope, extension cords, firearm)  · Get professional help from the community (Mental Health, Substance Abuse, psychological counseling)  · Do not be alone:Call your Safe Contact- someone whom you trust who will be there for you.  · Call your local CRISIS HOTLINE 645-9369 or 549-178-6863  · Call your local Children's Mobile Crisis Response Team [place] (953) 899-8129 or www.[employer]  · Call the toll free National Suicide Prevention Hotlines   · National Suicide Prevention Lifeline 908-768-BCCK (9482)  · [employer] Hope Line Network 800-SUICIDE (515-3631)      Non-ST Segment Elevation Heart Attack  A heart attack (myocardial infarction) happens when some of the heart muscle is injured or dies because it does not get enough oxygen. A non-ST segment elevation heart attack is a type of heart attack. It happens when the body does not get enough oxygen because an artery carrying blood to the heart muscles (coronary artery) becomes partly or temporarily blocked. This type of heart attack is usually less severe than the type of heart attack in which a coronary artery becomes completely blocked.  CAUSES  The most common cause of this condition is a blocked coronary artery. A coronary artery can become blocked from a gradual buildup of cholesterol, fat, and plaque. A blood clot can form over the plaque and block blood flow.  RISK FACTORS  This condition is  more likely to develop in:  · Smokers.  · Males.  · Older adults.  · Overweight and obese adults.  · People with high blood pressure (hypertension), high cholesterol, or diabetes.  · People with a family history of heart disease.  · People who do not get enough exercise.  · People who are under a lot of stress.  · People who drink too much alcohol.  · People who use illegal street drugs that increase the heart rate, such as cocaine and methamphetamines.  SYMPTOMS  Symptoms of this condition include:  · Chest pain or a feeling of pressure in the chest. It may feel like something is crushing or squeezing the chest.  · Discomfort in the upper back or in the area between the shoulder blades.  · Upper back pain.  · Tingling in the hands and arms.  · Shortness of breath.  · Heartburn or indigestion.  · Sudden cold sweats.  · Unexplained sweating.  · Sudden lightheadedness.  · Unexplained feelings of nervousness or anxiety.  · Feeling of tiredness, or not feeling well.  DIAGNOSIS  This condition is diagnosed based on a person's signs and symptoms and a physical exam. You may also have tests done, including:  · Blood tests.  · A chest X-ray.  · An test to measure the electrical activity of the heart (electrocardiogram).  · A test that uses sound waves to produce a picture of the heart (echocardiogram).  · A test to look at the heart arteries (coronary angiogram).  If you are still having chest pain after 12-24 hours, or if your health care providers think your heart is at risk, you may have a procedure called cardiac catheterization. In this procedure, a long, thin tube is inserted into an artery in your groin and moved up to the arteries in your heart. This procedure helps your health care provider figure out the source of the problem.   TREATMENT  This condition may be treated with:  · Bed rest in the hospital.  · Medicines to relieve chest pain.  · Medicines to protect the heart.  · If you have a blockage, a procedure in  which the artery is opened (angioplasty) and a stent is placed to keep the artery open.  After initial treatment you may need to take medicine to:  · Keep your blood from clotting too easily.  · Control your blood pressure.  · Lower your cholesterol.  · Control abnormal heart rhythms (arrhythmias).  HOME CARE INSTRUCTIONS  · Take medicines only as directed by your health care provider.  · Do not take the following medicines unless your health care provider approves:  ¨ Nonsteroidal anti-inflammatory drugs (NSAIDs), such as ibuprofen, naproxen, or celecoxib.  ¨ Vitamin supplements that contain vitamin A, vitamin E, or both.  ¨ Hormone replacement therapy that contains estrogen with or without progestin.  · Make lifestyle changes as directed by your health care provider. These may include:  ¨ Using no tobacco products, including cigarettes, chewing tobacco, and electronic cigarettes. If you are struggling to quit, ask your health care provider for help.  ¨ Exercising as directed by your health care provider. Ask for a list of activities that are safe for you.  ¨ Eating a heart-healthy diet. Work with a registered dietitian to learn healthy eating options.  ¨ Maintaining a healthy weight.  ¨ Managing other medical conditions, like diabetes.  ¨ Reducing stress.  ¨ Limiting how much alcohol you drink as directed by your health care provider.  SEEK IMMEDIATE MEDICAL CARE IF:  · You have any symptoms of this condition.  This information is not intended to replace advice given to you by your health care provider. Make sure you discuss any questions you have with your health care provider.  Document Released: 07/17/2006 Document Revised: 03/11/2013 Document Reviewed: 11/25/2015  [email or web address] Interactive Patient Education © 2017 [employer]

## 2018-05-16 NOTE — CARE PLAN
Problem: Knowledge Deficit  Goal: Knowledge of disease process/condition, treatment plan, diagnostic tests, and medications will improve  Outcome: PROGRESSING AS EXPECTED      Problem: Respiratory:  Goal: Respiratory status will improve  Outcome: PROGRESSING AS EXPECTED

## 2018-05-16 NOTE — DISCHARGE PLANNING
Medical Social Work    Referral: DME    Intervention: KEVIN spoke with on call worker from [facility] [place], who stated they are waiting for the [place] office to accept pt before they deliver the O2 to bedside. [facility] [place] is also trying to verify pt's insurance.     Plan: SW to remain available.

## 2018-05-16 NOTE — DISCHARGE PLANNING
Anticipated DC Disposition: Home    Action: LSW spoke to pt's son Bong via phone. Bong is on his way to the hospital to  pt and is bringing oxygen tanks.     Barriers to Discharge: None.     Plan: Pt to DC home today.

## 2018-05-16 NOTE — DISCHARGE PLANNING
Contacted [facility] transport spoke to Vanessa, who will call supervisor and call back.

## 2018-05-16 NOTE — DISCHARGE PLANNING
Follow up with [facility] in [place] spoke to Norma who requested latest MD progress note be faxed to [phone]. Faxed at [phone].

## 2018-05-17 ENCOUNTER — PATIENT OUTREACH (OUTPATIENT)
Dept: HEALTH INFORMATION MANAGEMENT | Facility: OTHER | Age: 73
End: 2018-05-17

## 2018-05-17 NOTE — PROGRESS NOTES
Assumed care of pt.  Assessment complete.  Pt denies any pain.  Discussed plan of care.  Call light within reach.  Bed alarm active.  Treaded socks on pt.  Will continue to monitor.

## 2018-05-17 NOTE — PROGRESS NOTES
Pt discharged home with oxygen.  Discharge instructions provided to pt and son.  Scripts provided to pt and son.  POLST provided with paperwork.  Pt escorted to son's car via WC with home oxygen.

## 2023-09-07 NOTE — CODE DOCUMENTATION
Pain decreased per pt post morphine. Complaining of difficulty breathing.   Peng Advancement Flap Text: The defect edges were debeveled with a #15 scalpel blade.  Given the location of the defect, shape of the defect and the proximity to free margins a Peng advancement flap was deemed most appropriate.  Using a sterile surgical marker, an appropriate advancement flap was drawn incorporating the defect and placing the expected incisions within the relaxed skin tension lines where possible. The area thus outlined was incised deep to adipose tissue with a #15 scalpel blade.  The skin margins were undermined to an appropriate distance in all directions utilizing iris scissors.